# Patient Record
Sex: MALE | Race: WHITE | Employment: OTHER | URBAN - METROPOLITAN AREA
[De-identification: names, ages, dates, MRNs, and addresses within clinical notes are randomized per-mention and may not be internally consistent; named-entity substitution may affect disease eponyms.]

---

## 2017-03-02 RX ORDER — ISOSORBIDE MONONITRATE 60 MG/1
TABLET, EXTENDED RELEASE ORAL
Qty: 90 TAB | Refills: 3 | Status: SHIPPED | OUTPATIENT
Start: 2017-03-02 | End: 2017-03-07 | Stop reason: SDUPTHER

## 2017-03-08 RX ORDER — ISOSORBIDE MONONITRATE 60 MG/1
TABLET, EXTENDED RELEASE ORAL
Qty: 90 TAB | Refills: 3 | Status: SHIPPED | OUTPATIENT
Start: 2017-03-08 | End: 2018-01-06 | Stop reason: SDUPTHER

## 2017-05-16 ENCOUNTER — OFFICE VISIT (OUTPATIENT)
Dept: CARDIOLOGY CLINIC | Age: 72
End: 2017-05-16

## 2017-05-16 VITALS
BODY MASS INDEX: 34.8 KG/M2 | OXYGEN SATURATION: 98 % | HEIGHT: 69 IN | HEART RATE: 76 BPM | WEIGHT: 235 LBS | DIASTOLIC BLOOD PRESSURE: 78 MMHG | SYSTOLIC BLOOD PRESSURE: 110 MMHG

## 2017-05-16 DIAGNOSIS — E78.00 PURE HYPERCHOLESTEROLEMIA: ICD-10-CM

## 2017-05-16 DIAGNOSIS — I48.19 PERSISTENT ATRIAL FIBRILLATION (HCC): Primary | ICD-10-CM

## 2017-05-16 DIAGNOSIS — I25.10 CORONARY ARTERY DISEASE INVOLVING NATIVE CORONARY ARTERY OF NATIVE HEART WITHOUT ANGINA PECTORIS: ICD-10-CM

## 2017-05-16 DIAGNOSIS — I10 ESSENTIAL HYPERTENSION: ICD-10-CM

## 2017-05-16 NOTE — PROGRESS NOTES
Review of Systems   Constitutional: Positive for weight loss. Negative for chills, diaphoresis, fever and malaise/fatigue. Respiratory: Negative. Cardiovascular: Negative. Gastrointestinal: Positive for heartburn. Negative for abdominal pain, blood in stool, constipation, diarrhea, melena, nausea and vomiting. Musculoskeletal: Negative. Neurological: Negative for weakness.

## 2017-05-16 NOTE — PROGRESS NOTES
HPI: I saw Dario White in my office today in cardiovascular evaluation regarding his chronic coronary artery disease and atrial fibrillation. Mr. Ulysses Dillon is a pleasant 81 y.o. white male who I had not seen for 15-20 years when he presented to the office on 3/21/16 with the above-captioned problems. I had originally seen him for Dr. Michelle Capone for new onset atrial fibrillation many years ago. I tried to do a cardioversion at one time and that was unsuccessful and we decided to control his heart rate with beta-blockers and anticoagulate him with Coumadin.       He has been followed more recently by Dr. Luz Maria Lane over the years, but began to have more shortness of breath and some chest tightness sounding anginal prompting him to come to see him on 3/21/16. We did a nuclear myocardial perfusion study at that time which was read as low risk showing no signs of ischemia and low normal ejection fraction 52%. I increased his Toprol-XL and added Imdur to his regimen and he really did reasonably well until he came into my office on October 7, 2016 with a history of increasing shortness of breath and chest tightness with activity which is quite restricting and even though his nuclear myocardial perfusion study 6 months ago was negative his symptoms were so concerning that I decided to refer him directly for cardiac catheterization.     The cardiac catheterization was completed by Dr. Jae Wall on October 17, 2016 with findings of only moderate two-vessel coronary artery disease with a 70% mid LAD obstruction and a 60% mid circumflex obstructions neither of which were shown to have any hemodynamic significance with fractional flow reserve testing. He did have a mean gradient of 29 mmHg across the aortic valve which was difficult to obtain adequate values for in view of the atrial fibrillation suggesting moderate aortic stenosis.  He subsequently had an echocardiogram the same day which showed a mean gradient of 21 mmHg across the valve again suggesting mild to moderate aortic stenosis. He comes to the office today and relates he is really doing very well. He has been put on an inhaler by Dr. Dennys Robbins and seems to be breathing somewhat better and is little bit more active. He denies any chest pain issues and tells me he is going back to golfing and denies any cardiac symptomatology at this time. Encounter Diagnoses   Name Primary?  Persistent atrial fibrillation (HCC) Yes    Coronary artery disease involving native coronary artery of native heart without angina pectoris     Essential hypertension     Pure hypercholesterolemia        Discussion: This patient appears to be doing about as well as we could expect and I really have no recommendations for change at this time. He seems to be well compensated from a cardiovascular vantage without any symptoms to suggest the development of symptomatic obstructive coronary artery disease. The auscultatory examination of his heart does not suggest that his aortic stenosis is getting any worse and I still feel that this is probably moderate in severity and I do not feel we need to do any testing at this time to further assess his aortic stenosis. He tells me his cholesterols been doing quite well as managed through Dr. Jenna Kendall office and I will get a copy of that for my records but historically his cholesterol has done reasonably well with LDLs in the 80s on Zocor 40 mg daily and I will leave management of his cholesterol to Dr. Dennys Robbins. His blood pressure is very well-controlled today and his heart rate in atrial fibrillation appears to be well-controlled on EKG today so I am simply get a plan to see him again in several months. PCP: Jose Charlton MD      Past Medical History:   Diagnosis Date    Atrial fibrillation ([facility] [address] 1998    CAD (coronary artery disease)     Cardiac nuclear imaging test, normal 04/01/2016    Low risk. No ischemia or prior infarction. EF 52%.   Neg EKG on pharm stress test.    Diabetes ([facility] [address]     DM (diabetes mellitus) ([facility] [address]     GERD (gastroesophageal reflux disease)     Hematuria 2001    HLD (hyperlipidemia)     HTN (hypertension)     HTN (hypertension)     Hypothyroid     Pancreatitis        Past Surgical History:   Procedure Laterality Date    CARDIAC CATHETERIZATION  10/17/2016         CORONARY ARTERY ANGIOGRAM  10/17/2016         FRACTIONAL FLOW RESERVE  10/17/2016         FRACTIONAL FLOW RESERVE ADDL  10/17/2016         HX CYST REMOVAL      x4    LV ANGIOGRAPHY  10/17/2016            Current Outpatient Rx   Name  Route  Sig  Dispense  Refill    isosorbide mononitrate ER (IMDUR) 60 mg CR tablet    Oral    Take 1 Tab by mouth every morning. 90 Tab    2      aspirin delayed-release 81 mg tablet    Oral    Take 81 mg by mouth daily.  aclidinium bromide (TUDORZA PRESSAIR) 400 mcg/actuation inhaler    Inhalation    Take 1 Puff by inhalation daily.  simvastatin (ZOCOR) 40 mg tablet    Oral    Take 40 mg by mouth nightly.  warfarin (COUMADIN) 5 mg tablet    Oral    Take 5 mg by mouth. As directed. Followed by PCP              metFORMIN ER (GLUCOPHAGE XR) 500 mg tablet    Oral    Take 500 mg by mouth daily (with dinner).  levothyroxine (SYNTHROID) 100 mcg tablet    Oral    Take 100 mcg by mouth Daily (before breakfast).  HYDROcodone-acetaminophen (XODOL) 5-300 mg tablet    Oral    Take 1 Tab by mouth every six (6) hours as needed.  nitroglycerin (NITROSTAT) 0.4 mg SL tablet    SubLINGual    0.4 mg by SubLINGual route every five (5) minutes as needed for Chest Pain.  umeclidinium-vilanterol 62.5-25 mcg/actuation dsdv    Inhalation    Take 1 Puff by inhalation daily.  cholecalciferol, vitamin D3, 2,000 unit tab    Oral    Take 1 Tab by mouth daily.               metoprolol succinate (TOPROL-XL) 100 mg XL tablet    Oral    Take 1.5 Tabs by mouth daily. 60 Tab    3         No Known Allergies    Social History :  Social History   Substance Use Topics    Smoking status: Current Every Day Smoker     Packs/day: 1.00     Years: 52.00    Smokeless tobacco: Never Used    Alcohol use Yes        Family History: family history includes Diabetes in his father and mother; Stroke in his father. Review of Systems:   Constitutional: Positive for weight loss. Negative for chills, diaphoresis, fever and malaise/fatigue. Respiratory: Negative. Cardiovascular: Negative. Gastrointestinal: Positive for heartburn. Negative for abdominal pain, blood in stool, constipation, diarrhea, melena, nausea and vomiting. Musculoskeletal: Negative. Neurological: Negative for weakness. Physical Exam:    The patient is a cooperative, alert, well developed, well nourished 81 y.o. white  male with full white beard who is in no acute distress at the time of the examination. Visit Vitals    Ht 5' 9\" (1.753 m)       HEENT: Conjuctiva white, mucosa moist, no pallor or cyanosis. Xanthelasmas on the medial aspect of both eyelids. NECK: Supple without masses, tenderness or thyromegaly. There was no jugular venous distention. Carotid are full bilaterally without bruits. CHEST: Symmetrical with good excursion. LUNGS: Clear to auscultation in all fields. HEART: The apex is not displaced. There were no lifts, thrills or heaves. There is a normal S1 and S2. There is a grade 1-2/6 mid peaking systolic ejection murmur along left sternal border with radiation to the base without appreciable diastolic murmurs, rubs, clicks, or gallops auscultated. ABDOMEN: Soft without masses, tenderness or organomegaly. EXTREMITIES: Full peripheral pulses without peripheral edema. INTEGUMENT: Warm and dry   NEUROLOGICAL: The patient is oriented x 3 with motor function grossly intact.     Review of Data: See PMH and Cardiology and Imaging sections for cardiac testing      Results for orders placed or performed in visit on 10/24/16   AMB POC EKG ROUTINE W/ 12 LEADS, INTER & REP     Status: None    Narrative    Atrial fibrillation with controlled ventricular response rate in the mid 80's. There is a Q-wave in lead V2 which could suggest past anteroseptal infarction or simply be from lead placement variation. Left ventricular hypertrophy by limb lead voltage criteria. This EKG is similar to the EKG of October 12, 2016. Reed Fisher D.O., F.A.C.C. [facility]  [facility]  [place] [address]. [address] [facility]  [phone]    PLEASE NOTE:  This document has been produced using voice recognition software. Unrecognized errors in transcription may be present.

## 2017-05-16 NOTE — MR AVS SNAPSHOT
Visit Information Date & Time Provider Department Dept. Phone Encounter #  
 5/16/2017 10:20 AM Larissa Sánchez DO [facility] [id number] Follow-up Instructions Return in about 6 months (around 11/16/2017), or if symptoms worsen or fail to improve. Upcoming Health Maintenance Date Due Hepatitis C Screening 1945 FOOT EXAM Q1 4/3/1957 EYE EXAM RETINAL OR DILATED Q1 4/3/1957 DTaP/Tdap/Td series (1 - Tdap) 4/3/1968 ZOSTER VACCINE AGE 60> 4/3/2007 MICROALBUMIN Q1 11/4/2010 GLAUCOMA SCREENING Q2Y 4/3/2012 Pneumococcal 65+ Low/Medium Risk (1 of 2 - PCV13) 4/3/2012 MEDICARE YEARLY EXAM 4/3/2012 HEMOGLOBIN A1C Q6M 7/22/2016 FOBT Q 1 YEAR AGE 50-75 7/24/2016 LIPID PANEL Q1 4/25/2017 INFLUENZA AGE 9 TO ADULT 8/1/2017 Allergies as of 5/16/2017  Review Complete On: 5/16/2017 By: Larissa Sánchez DO No Known Allergies Current Immunizations  Never Reviewed No immunizations on file. Not reviewed this visit You Were Diagnosed With   
  
 Codes Comments Persistent atrial fibrillation (HCC)    -  Primary ICD-10-CM: I48.1 ICD-9-CM: 427.31 Coronary artery disease involving native coronary artery of native heart without angina pectoris     ICD-10-CM: I25.10 ICD-9-CM: 414.01 Essential hypertension     ICD-10-CM: I10 
ICD-9-CM: 401.9 Pure hypercholesterolemia     ICD-10-CM: E78.00 ICD-9-CM: 272.0 Vitals BP Pulse Height(growth percentile) Weight(growth percentile) SpO2 BMI  
 110/78 76 5' 9\" (1.753 m) 235 lb (106.6 kg) 98% 34.7 kg/m2 Smoking Status Current Every Day Smoker Vitals History BMI and BSA Data Body Mass Index Body Surface Area 34.7 kg/m 2 2.28 m 2 Preferred Pharmacy Pharmacy Name Phone 305 [facility], [address] [address] [place] [address] [address] Your Updated Medication List  
  
   
 This list is accurate as of: 5/16/17 11:19 AM.  Always use your most recent med list.  
  
  
  
  
 aspirin delayed-release 81 mg tablet Take 81 mg by mouth daily. cholecalciferol (vitamin D3) 2,000 unit Tab Take 1 Tab by mouth daily. HYDROcodone-acetaminophen 5-300 mg tablet Commonly known as:  Derald Dracut Take 1 Tab by mouth every six (6) hours as needed. isosorbide mononitrate ER 60 mg CR tablet Commonly known as:  IMDUR  
take 1 tablet by mouth every morning  
  
 levothyroxine 100 mcg tablet Commonly known as:  SYNTHROID Take 100 mcg by mouth Daily (before breakfast). metFORMIN  mg tablet Commonly known as:  GLUCOPHAGE XR Take 500 mg by mouth daily (with dinner). metoprolol succinate 100 mg tablet Commonly known as:  TOPROL-XL Take 1.5 Tabs by mouth daily. nitroglycerin 0.4 mg SL tablet Commonly known as:  NITROSTAT  
1 Tab by SubLINGual route every five (5) minutes as needed for Chest Pain.  
  
 simvastatin 40 mg tablet Commonly known as:  ZOCOR Take 40 mg by mouth nightly. umeclidinium-vilanterol 62.5-25 mcg/actuation inhaler Commonly known as:  Madhavi Ronal Take 1 Puff by inhalation daily. warfarin 5 mg tablet Commonly known as:  COUMADIN Take 5 mg by mouth. As directed. Followed by PCP We Performed the Following AMB POC EKG ROUTINE W/ 12 LEADS, INTER & REP [92488 CPT(R)] Follow-up Instructions Return in about 6 months (around 11/16/2017), or if symptoms worsen or fail to improve. Introducing [facility]! [employer] introduces Loop patient portal. Now you can access parts of your medical record, email your doctor's office, and request medication refills online. 1. In your internet browser, go to [email or web address] 2. Click on the First Time User? Click Here link in the Sign In box. You will see the New Member Sign Up page. 3. Enter your [facility] Access Code exactly as it appears below. You will not need to use this code after youve completed the sign-up process. If you do not sign up before the expiration date, you must request a new code. · [facility] Access Code: [facility] Expires: 8/14/2017 10:30 AM 
 
4. Enter the last four digits of your Social Security Number (xxxx) and Date of Birth (mm/dd/yyyy) as indicated and click Submit. You will be taken to the next sign-up page. 5. Create a [employer] ID. This will be your [employer]story login ID and cannot be changed, so think of one that is secure and easy to remember. 6. Create a [facility]story password. You can change your password at any time. 7. Enter your Password Reset Question and Answer. This can be used at a later time if you forget your password. 8. Enter your e-mail address. You will receive e-mail notification when new information is available in [address]. 9. Click Sign Up. You can now view and download portions of your medical record. 10. Click the Download Summary menu link to download a portable copy of your medical information. If you have questions, please visit the Frequently Asked Questions section of the [employer] website. Remember, [employer] is NOT to be used for urgent needs. For medical emergencies, dial 911. Now available from your iPhone and Android! Please provide this summary of care documentation to your next provider. Your primary care clinician is listed as Vicenta Soler. If you have any questions after today's visit, please call [phone].

## 2017-08-16 ENCOUNTER — HOSPITAL ENCOUNTER (OUTPATIENT)
Dept: CT IMAGING | Age: 72
Discharge: HOME | End: 2017-08-16
Attending: INTERNAL MEDICINE
Payer: MEDICARE

## 2017-08-16 DIAGNOSIS — R19.07 ABDOMINAL OR PELVIC SWELLING, MASS, OR LUMP, GENERALIZED: ICD-10-CM

## 2017-08-16 DIAGNOSIS — R22.2 MASS IN CHEST: ICD-10-CM

## 2017-08-16 LAB — CREAT UR-MCNC: 1 MG/DL (ref 0.6–1.3)

## 2017-08-16 PROCEDURE — 74177 CT ABD & PELVIS W/CONTRAST: CPT

## 2017-08-16 PROCEDURE — 82565 ASSAY OF CREATININE: CPT

## 2017-08-16 PROCEDURE — 74011636320 HC RX REV CODE- 636/320: Performed by: INTERNAL MEDICINE

## 2017-08-16 RX ADMIN — IOPAMIDOL 80 ML: 612 INJECTION, SOLUTION INTRAVENOUS at 16:00

## 2017-10-11 RX ORDER — METOPROLOL SUCCINATE 100 MG/1
150 TABLET, EXTENDED RELEASE ORAL DAILY
Qty: 135 TAB | Refills: 3 | Status: SHIPPED | OUTPATIENT
Start: 2017-10-11 | End: 2018-09-06 | Stop reason: SDUPTHER

## 2017-11-13 ENCOUNTER — OFFICE VISIT (OUTPATIENT)
Dept: SURGERY | Age: 72
End: 2017-11-13

## 2017-11-13 VITALS — RESPIRATION RATE: 18 BRPM | DIASTOLIC BLOOD PRESSURE: 80 MMHG | SYSTOLIC BLOOD PRESSURE: 110 MMHG

## 2017-11-13 DIAGNOSIS — R59.0 LYMPHADENOPATHY OF HEAD AND NECK REGION: Primary | ICD-10-CM

## 2017-11-13 RX ORDER — PREDNISONE 10 MG/1
TABLET ORAL
COMMUNITY
End: 2017-11-28 | Stop reason: ALTCHOICE

## 2017-11-13 RX ORDER — AMOXICILLIN AND CLAVULANATE POTASSIUM 875; 125 MG/1; MG/1
TABLET, FILM COATED ORAL EVERY 12 HOURS
COMMUNITY
End: 2017-11-28 | Stop reason: ALTCHOICE

## 2017-11-13 RX ORDER — PANTOPRAZOLE SODIUM 40 MG/1
40 TABLET, DELAYED RELEASE ORAL 2 TIMES DAILY
COMMUNITY

## 2017-11-13 NOTE — PROGRESS NOTES
Progress Note    Patient: Jama Sin  MRN: [id number]  SSN: [id number]   YOB: 1945  Age: 81 y.o. Sex: male     Chief Complaint   Patient presents with    Mass     left side of neck       HPI    Mr. Mason is a 72-year-old gentleman with a mass near his parotid gland on the left side of his neck. He is here for that he does not know how long he has had it but it has been probably a year or more. He is due for a CT scan of his neck tomorrow. He has no oral lesions or dental caries that he reports never had fever or weight loss. These are painless and enlarging. He does have a history of smoking and alcohol consumption. Past Medical History:   Diagnosis Date    Atrial fibrillation ([address] 1998    CAD (coronary artery disease)     Cardiac nuclear imaging test, normal 04/01/2016    Low risk. No ischemia or prior infarction. EF 52%. Neg EKG on pharm stress test.    Diabetes ([address]     DM (diabetes mellitus) ([address]     GERD (gastroesophageal reflux disease)     Hematuria 2001    HLD (hyperlipidemia)     HTN (hypertension)     HTN (hypertension)     Hypothyroid     Pancreatitis      Past Surgical History:   Procedure Laterality Date    CARDIAC CATHETERIZATION  10/17/2016         CORONARY ARTERY ANGIOGRAM  10/17/2016         FRACTIONAL FLOW RESERVE  10/17/2016         FRACTIONAL FLOW RESERVE ADDL  10/17/2016         HX CYST REMOVAL      x4    LV ANGIOGRAPHY  10/17/2016          No Known Allergies  Current Outpatient Prescriptions   Medication Sig Dispense Refill    pantoprazole (PROTONIX) 40 mg tablet Take 40 mg by mouth daily.  amoxicillin-clavulanate (AUGMENTIN) 875-125 mg per tablet Take  by mouth every twelve (12) hours.  predniSONE (DELTASONE) 10 mg tablet Take  by mouth daily (with breakfast).  metoprolol succinate (TOPROL-XL) 100 mg tablet Take 1.5 Tabs by mouth daily.  135 Tab 3    isosorbide mononitrate ER (IMDUR) 60 mg CR tablet take 1 tablet by mouth every morning 90 Tab 3    nitroglycerin (NITROSTAT) 0.4 mg SL tablet 1 Tab by SubLINGual route every five (5) minutes as needed for Chest Pain. 25 Bottle 6    aspirin delayed-release 81 mg tablet Take 81 mg by mouth daily.  simvastatin (ZOCOR) 40 mg tablet Take 40 mg by mouth nightly.  warfarin (COUMADIN) 5 mg tablet Take 5 mg by mouth. As directed. Followed by PCP      metFORMIN ER (GLUCOPHAGE XR) 500 mg tablet Take 500 mg by mouth daily (with dinner).  levothyroxine (SYNTHROID) 100 mcg tablet Take 100 mcg by mouth Daily (before breakfast).  HYDROcodone-acetaminophen (XODOL) 5-300 mg tablet Take 1 Tab by mouth every six (6) hours as needed.  umeclidinium-vilanterol 62.5-25 mcg/actuation dsdv Take 1 Puff by inhalation daily.  cholecalciferol, vitamin D3, 2,000 unit tab Take 1 Tab by mouth daily. Social History     Social History    Marital status:      Spouse name: N/A    Number of children: N/A    Years of education: N/A     Occupational History    Not on file.      Social History Main Topics    Smoking status: Current Every Day Smoker     Packs/day: 1.00     Years: 52.00    Smokeless tobacco: Never Used    Alcohol use Yes    Drug use: Not on file    Sexual activity: Not on file     Other Topics Concern    Not on file     Social History Narrative    ** Merged History Encounter **          Family History   Problem Relation Age of Onset    Diabetes Mother     Diabetes Father     Stroke Father          Review of systems:  Patient denies any reflux, emesis, abdominal pain, change in bowel habits, hematochezia, melena, fever, weight loss, fatigue chills, dermatitis, abnormal moles, change in vision, vertigo, epistaxis, dysphagia, hoarseness, chest pain, palpitations, hypertension, edema, cough, shortness of breath, wheezing, hemoptysis, snoring, hematuria, diabetes, thyroid disease, anemia, bruising, history of blood transfusion, dizziness, headache, or fainting. Physical Examination    Well developed well nourished male in no apparent distress  Visit Vitals    /80    Resp 18      Head: normocephalic, atraumatic  Mouth: Clear, no overt lesions, oral mucosa pink and moist no lesions, infections or caries  Neck: supple, no masses, no adenopathy or carotid bruits, trachea midline   Resp: clear to auscultation bilaterally, no wheeze, rhonchi or rales, excursi easily easily palpable lymph node left neck just below the bottom of the ear   ons normal and symmetrical  Cardio: Regular rate and rhythm, no murmurs, clicks, gallops or rubs, no edema or varicosities  Abdomen: soft, nontender, nondistended, normoactive bowel sounds, no hernias, no hepatosplenomegaly,   Back: Deferred  Extremeties: warm, well-perfused, no tenderness or swelling, normal gait/station  Neuro: sensation and strength grossly intact and symmetrical  Psych: alert and oriented to person, place and time  Breast exam deferred    IMPRESSION  Left neck lymphadenopathy scheduled for CT scan tomorrow    PLAN  Orders Placed This Encounter    pantoprazole (PROTONIX) 40 mg tablet     Sig: Take 40 mg by mouth daily.  amoxicillin-clavulanate (AUGMENTIN) 875-125 mg per tablet     Sig: Take  by mouth every twelve (12) hours.  predniSONE (DELTASONE) 10 mg tablet     Sig: Take  by mouth daily (with breakfast). Hold on biopsy as this may be a parotid mass. Geovanna Nash MD    62-07-60 Mr. Marissa Worthy had his CT scan of his neck yesterday. This shows bilateral lymphadenopathy unchanged since 2015 both of the large lymph nodes are near the parotid glands.   I will refer him to ENT for further evaluation and workup

## 2017-11-14 ENCOUNTER — HOSPITAL ENCOUNTER (OUTPATIENT)
Dept: CT IMAGING | Age: 72
Discharge: HOME | End: 2017-11-14
Attending: INTERNAL MEDICINE
Payer: MEDICARE

## 2017-11-14 DIAGNOSIS — I88.9 NONSPECIFIC LYMPHADENITIS, UNSPECIFIED: ICD-10-CM

## 2017-11-14 PROCEDURE — 70490 CT SOFT TISSUE NECK W/O DYE: CPT

## 2017-11-15 PROBLEM — R59.0 LYMPHADENOPATHY OF HEAD AND NECK REGION: Status: ACTIVE | Noted: 2017-11-15

## 2017-11-28 ENCOUNTER — OFFICE VISIT (OUTPATIENT)
Dept: CARDIOLOGY CLINIC | Age: 72
End: 2017-11-28

## 2017-11-28 VITALS
SYSTOLIC BLOOD PRESSURE: 98 MMHG | HEART RATE: 83 BPM | DIASTOLIC BLOOD PRESSURE: 66 MMHG | HEIGHT: 69 IN | WEIGHT: 240 LBS | BODY MASS INDEX: 35.55 KG/M2 | OXYGEN SATURATION: 97 %

## 2017-11-28 DIAGNOSIS — I35.0 AORTIC VALVE STENOSIS, ETIOLOGY OF CARDIAC VALVE DISEASE UNSPECIFIED: ICD-10-CM

## 2017-11-28 DIAGNOSIS — I10 ESSENTIAL HYPERTENSION: ICD-10-CM

## 2017-11-28 DIAGNOSIS — I48.19 PERSISTENT ATRIAL FIBRILLATION (HCC): ICD-10-CM

## 2017-11-28 DIAGNOSIS — E78.00 PURE HYPERCHOLESTEROLEMIA: ICD-10-CM

## 2017-11-28 DIAGNOSIS — I25.10 CORONARY ARTERY DISEASE INVOLVING NATIVE CORONARY ARTERY OF NATIVE HEART WITHOUT ANGINA PECTORIS: Primary | ICD-10-CM

## 2017-11-28 NOTE — PROGRESS NOTES
HPI: I saw Rebecca Olea in my office today in cardiovascular evaluation regarding his chronic coronary artery disease, atrial fibrillation, and aortic valvular disease. Mr. Jaqueline Oliva is a pleasant 81 y.o. white male who I had not seen for 15-20 years when he presented to the office on 3/21/16 with the above-captioned problems. I had originally seen him for Dr. Eliecer Murillo for new onset atrial fibrillation many years ago. I tried to do a cardioversion at one time and that was unsuccessful and we decided to control his heart rate with beta-blockers and anticoagulate him with Coumadin.       He has been followed more recently by Dr. Tracie Donohue over the years, but began to have more shortness of breath and some chest tightness sounding anginal prompting him to come to see him on 3/21/16. We did a nuclear myocardial perfusion study at that time which was read as low risk showing no signs of ischemia and low normal ejection fraction 52%. I increased his Toprol-XL and added Imdur to his regimen and he really did reasonably well until he came into my office on October 7, 2016 with a history of increasing shortness of breath and chest tightness with activity which is quite restricting and even though his nuclear myocardial perfusion study 6 months ago was negative his symptoms were so concerning that I decided to refer him directly for cardiac catheterization.      The cardiac catheterization was completed by Dr. Jack Polo on October 17, 2016 with findings of only moderate two-vessel coronary artery disease with a 70% mid LAD obstruction and a 60% mid circumflex obstructions neither of which were shown to have any hemodynamic significance with fractional flow reserve testing. He did have a mean gradient of 29 mmHg across the aortic valve which was difficult to obtain adequate values for in view of the atrial fibrillation suggesting moderate aortic stenosis.  He subsequently had an echocardiogram the same day which showed a mean gradient of 21 mmHg across the valve again suggesting mild to moderate aortic stenosis. Encounter Diagnoses   Name Primary?  Coronary artery disease involving native coronary artery of native heart without angina pectoris Yes    Persistent atrial fibrillation (HCC)     Essential hypertension     Pure hypercholesterolemia     Aortic valve stenosis, etiology of cardiac valve disease unspecified        Discussion: This patient appears to be doing reasonably well. He is not having any problems with exertional chest tightness currently although he always has some shortness of breath he thinks related to his persistent half a pack a day cigarette abuse. His aortic stenosis murmur does not sound more severe but I am going to repeat an echocardiogram on him to see how quickly his aortic stenosis is worsening and just to be sure there is no change in his overall left ventricular function. His cholesterol has been somewhat elevated in the past and this is been managed by Dr. Anthony Rebolledo and the patient is currently taking Zocor 40 mg daily for treatment of this problem. I am going to get a copy of his cholesterol from Dr. Anthony Rebolledo for my records. Certainly would like to see his LDL at least under 70 if possible. His blood pressure is well-controlled today and his heart rate atrial fibrillation is well-controlled in the 80s as well. He does continue on Coumadin for anticoagulation but this is being managed by Dr. Anthony Rebolledo. Since the patient is otherwise doing fairly well I am simply get a plan to see him again in several months    PCP: Marry Mcneal MD      Past Medical History:   Diagnosis Date    [facility]rial fibrillation [facility] [address] 1998    CAD (coronary artery disease)     Cardiac nuclear imaging test, normal 04/01/2016    Low risk. No ischemia or prior infarction. EF 52%.   Neg EKG on pharm stress test.    Diabetes ([address]     DM (diabetes mellitus) ([facility] [address]     GERD (gastroesophageal reflux disease)     Hematuria 2001  HLD (hyperlipidemia)     HTN (hypertension)     HTN (hypertension)     Hypothyroid     Pancreatitis        Past Surgical History:   Procedure Laterality Date    CARDIAC CATHETERIZATION  10/17/2016         CORONARY ARTERY ANGIOGRAM  10/17/2016         FRACTIONAL FLOW RESERVE  10/17/2016         FRACTIONAL FLOW RESERVE ADDL  10/17/2016         HX CYST REMOVAL      x4    LV ANGIOGRAPHY  10/17/2016            Current Outpatient Rx   Name  Route  Sig  Dispense  Refill    isosorbide mononitrate ER (IMDUR) 60 mg CR tablet    Oral    Take 1 Tab by mouth every morning. 90 Tab    2      aspirin delayed-release 81 mg tablet    Oral    Take 81 mg by mouth daily.  aclidinium bromide (TUDORZA PRESSAIR) 400 mcg/actuation inhaler    Inhalation    Take 1 Puff by inhalation daily.  simvastatin (ZOCOR) 40 mg tablet    Oral    Take 40 mg by mouth nightly.  warfarin (COUMADIN) 5 mg tablet    Oral    Take 5 mg by mouth. As directed. Followed by PCP              metFORMIN ER (GLUCOPHAGE XR) 500 mg tablet    Oral    Take 500 mg by mouth daily (with dinner).  levothyroxine (SYNTHROID) 100 mcg tablet    Oral    Take 100 mcg by mouth Daily (before breakfast).  HYDROcodone-acetaminophen (XODOL) 5-300 mg tablet    Oral    Take 1 Tab by mouth every six (6) hours as needed.  nitroglycerin (NITROSTAT) 0.4 mg SL tablet    SubLINGual    0.4 mg by SubLINGual route every five (5) minutes as needed for Chest Pain.  umeclidinium-vilanterol 62.5-25 mcg/actuation dsdv    Inhalation    Take 1 Puff by inhalation daily.  cholecalciferol, vitamin D3, 2,000 unit tab    Oral    Take 1 Tab by mouth daily.  metoprolol succinate (TOPROL-XL) 100 mg XL tablet    Oral    Take 1.5 Tabs by mouth daily.     60 Tab    3         No Known Allergies    Social History :  Social History   Substance Use Topics    Smoking status: Current Every Day Smoker     Packs/day: 1.00     Years: 52.00    Smokeless tobacco: Never Used    Alcohol use Yes        Family History: family history includes Diabetes in his father and mother; Stroke in his father. Review of Systems:   Constitutional: Negative for chills, fever, malaise/fatigue and weight loss. Respiratory: Positive for cough and shortness of breath. Negative for hemoptysis and wheezing. Cardiovascular: Positive for palpitations and leg swelling. Negative for chest pain and orthopnea. Gastrointestinal: Positive for heartburn. Negative for abdominal pain, blood in stool, constipation, diarrhea, melena, nausea and vomiting. Musculoskeletal: Negative for falls, joint pain and myalgias. Neurological: Negative for dizziness. Physical Exam:    The patient is a cooperative, alert, well developed, well nourished 81 y.o. white  male with full white beard who is in no acute distress at the time of the examination. Visit Vitals    BP 98/66    Pulse 83    Ht 5' 9\" (1.753 m)    Wt 108.9 kg (240 lb)    SpO2 97%    BMI 35.44 kg/m2       HEENT: Conjuctiva white, mucosa moist, no pallor or cyanosis. Xanthelasmas on the medial aspect of both eyelids. NECK: Supple without masses, tenderness or thyromegaly. There was no jugular venous distention. Carotid are full bilaterally without bruits. CHEST: Symmetrical with good excursion. LUNGS: Clear to auscultation in all fields. HEART: The apex is not displaced. There were no lifts, thrills or heaves. There is a normal S1 and S2. There is a grade 1-2/6 mid peaking systolic ejection murmur along left sternal border with radiation to the base without appreciable diastolic murmurs, rubs, clicks, or gallops auscultated. ABDOMEN: Soft without masses, tenderness or organomegaly. EXTREMITIES: Full peripheral pulses without peripheral edema. INTEGUMENT: Warm and dry   NEUROLOGICAL: The patient is oriented x 3 with motor function grossly intact.     Review of Data: See PMH and Cardiology and Imaging sections for cardiac testing      Results for orders placed or performed in visit on 11/28/17   AMB POC EKG ROUTINE W/ 12 LEADS, INTER & REP     Status: None    Narrative    Atrial fibrillation with a controlled ventricular response rate around 80. Left axis deviation with possible left anterior fascicular block. Leftward hypertrophy by limb lead voltage criteria. Diffuse nonspecific inferolateral ST-T changes. Compared to the EKG of May 16, 2017 PVC noted at that time is not noted on the current tracing. Benjie Sharif D.O., F.A.C.C. [facility]  [facility]  [address] [place] [address]. [address] [address] [facility] [address]  [phone]    PLEASE NOTE:  This document has been produced using voice recognition software. Unrecognized errors in transcription may be present.

## 2017-11-28 NOTE — PROGRESS NOTES
1. Have you been to the ER, urgent care clinic since your last visit? Hospitalized since your last visit?no      2. Have you seen or consulted any other health care providers outside of the [address] [place] [address] since your last visit? Include any pap smears or colon screening.  no

## 2017-11-28 NOTE — MR AVS SNAPSHOT
Visit Information Date & Time Provider Department Dept. Phone Encounter #  
 11/28/2017  1:20 PM Brown Garcia DO [facility] [address] [id number] Follow-up Instructions Return in about 6 months (around 5/28/2018), or if symptoms worsen or fail to improve. Follow-up and Disposition History Upcoming Health Maintenance Date Due Hepatitis C Screening 1945 FOOT EXAM Q1 4/3/1957 EYE EXAM RETINAL OR DILATED Q1 4/3/1957 DTaP/Tdap/Td series (1 - Tdap) 4/3/1968 ZOSTER VACCINE AGE 60> 2/3/2007 MICROALBUMIN Q1 11/4/2010 GLAUCOMA SCREENING Q2Y 4/3/2012 Pneumococcal 65+ Low/Medium Risk (1 of 2 - PCV13) 4/3/2012 MEDICARE YEARLY EXAM 4/3/2012 HEMOGLOBIN A1C Q6M 7/22/2016 FOBT Q 1 YEAR AGE 50-75 7/24/2016 LIPID PANEL Q1 4/25/2017 Influenza Age 5 to Adult 8/1/2017 Allergies as of 11/28/2017  Review Complete On: 11/28/2017 By: Brown Garcia DO No Known Allergies Current Immunizations  Never Reviewed No immunizations on file. Not reviewed this visit You Were Diagnosed With   
  
 Codes Comments Coronary artery disease involving native coronary artery of native heart without angina pectoris    -  Primary ICD-10-CM: I25.10 ICD-9-CM: 414.01 Persistent atrial fibrillation (HCC)     ICD-10-CM: I48.1 ICD-9-CM: 427.31 Essential hypertension     ICD-10-CM: I10 
ICD-9-CM: 401.9 Pure hypercholesterolemia     ICD-10-CM: E78.00 ICD-9-CM: 272.0 Aortic valve stenosis, etiology of cardiac valve disease unspecified     ICD-10-CM: I35.0 ICD-9-CM: 424.1 Vitals BP Pulse Height(growth percentile) Weight(growth percentile) SpO2 BMI  
 98/66 83 5' 9\" (1.753 m) 240 lb (108.9 kg) 97% 35.44 kg/m2 Smoking Status Current Every Day Smoker Vitals History BMI and BSA Data Body Mass Index Body Surface Area  
 35.44 kg/m 2 2.3 m 2 Preferred Pharmacy Pharmacy Name Phone [address] [facility], [address] [facility] [address] Audrey Mcclelland Your Updated Medication List  
  
   
This list is accurate as of: 11/28/17  2:31 PM.  Always use your most recent med list.  
  
  
  
  
 aspirin delayed-release 81 mg tablet Take 81 mg by mouth daily. cholecalciferol (vitamin D3) 2,000 unit Tab Take 1 Tab by mouth daily. HYDROcodone-acetaminophen 5-300 mg tablet Commonly known as:  Gardner Rossy Take 1 Tab by mouth every six (6) hours as needed. isosorbide mononitrate ER 60 mg CR tablet Commonly known as:  IMDUR  
take 1 tablet by mouth every morning  
  
 levothyroxine 100 mcg tablet Commonly known as:  SYNTHROID Take 100 mcg by mouth Daily (before breakfast). metFORMIN  mg tablet Commonly known as:  GLUCOPHAGE XR Take 500 mg by mouth daily (with dinner). metoprolol succinate 100 mg tablet Commonly known as:  TOPROL-XL Take 1.5 Tabs by mouth daily. nitroglycerin 0.4 mg SL tablet Commonly known as:  NITROSTAT  
1 Tab by SubLINGual route every five (5) minutes as needed for Chest Pain.  
  
 pantoprazole 40 mg tablet Commonly known as:  PROTONIX Take 40 mg by mouth daily. simvastatin 40 mg tablet Commonly known as:  ZOCOR Take 40 mg by mouth nightly. umeclidinium-vilanterol 62.5-25 mcg/actuation inhaler Commonly known as:  Dianah Splinter Take 1 Puff by inhalation daily. warfarin 5 mg tablet Commonly known as:  COUMADIN Take 5 mg by mouth. As directed. Followed by PCP We Performed the Following AMB POC EKG ROUTINE W/ 12 LEADS, INTER & REP [27199 CPT(R)] Follow-up Instructions Return in about 6 months (around 5/28/2018), or if symptoms worsen or fail to improve. To-Do List   
 11/28/2017   ECHO:  2D ECHO COMPLETE ADULT (TTE) W OR WO CONTR   
  
 12/15/2017 11:00 AM  
 Appointment with [facility]- IE33 MACHINE (WT ) at [facility] NON-INVASIVE CARD ([phone]) Age Limit for ALL Heart procedures @ all [facility] facilities: 18 yrs and older only. Under the age of 25, refer to [address] [phone]). Wt Limit: 350lbs. This study requires patient to bring a written physician's order or MD office may fax the order to Central Scheduling at [phone]. Patient needs to bring a current list of all medications. No preparation is required for this study. Patients should report 15 minutes prior to their appointment time to the [facility], [address] [place] [address]/[address]. Patient Instructions Continue current medications. No changes. If you have any further questions or concerns, please contact our office. [phone] Introducing [facility] & HEALTH SERVICES! [facility] introduces [employer] patient portal. Now you can access parts of your medical record, email your doctor's office, and request medication refills online. 1. In your internet browser, go to [email or web address] 2. Click on the First Time User? Click Here link in the Sign In box. You will see the New Member Sign Up page. 3. Enter your [employer] Access Code exactly as it appears below. You will not need to use this code after youve completed the sign-up process. If you do not sign up before the expiration date, you must request a new code. · [employer] Access Code: [email or web address] Expires: 2/11/2018 10:35 AM 
 
4. Enter the last four digits of your Social Security Number (xxxx) and Date of Birth (mm/dd/yyyy) as indicated and click Submit. You will be taken to the next sign-up page. 5. Create a [employer] ID. This will be your [employer] login ID and cannot be changed, so think of one that is secure and easy to remember. 6. Create a [employer] password. You can change your password at any time. 7. Enter your Password Reset Question and Answer.  This can be used at a later time if you forget your password. 8. Enter your e-mail address. You will receive e-mail notification when new information is available in [address] 9. Click Sign Up. You can now view and download portions of your medical record. 10. Click the Download Summary menu link to download a portable copy of your medical information. If you have questions, please visit the Frequently Asked Questions section of the [employer] website. Remember, [employer] is NOT to be used for urgent needs. For medical emergencies, dial 911. Now available from your iPhone and Android! Please provide this summary of care documentation to your next provider. Your primary care clinician is listed as Garrett Santos. If you have any questions after today's visit, please call [phone].

## 2017-11-28 NOTE — PROGRESS NOTES
Review of Systems   Constitutional: Negative for chills, fever, malaise/fatigue and weight loss. Respiratory: Positive for cough and shortness of breath. Negative for hemoptysis and wheezing. Cardiovascular: Positive for palpitations and leg swelling. Negative for chest pain and orthopnea. Gastrointestinal: Positive for heartburn. Negative for abdominal pain, blood in stool, constipation, diarrhea, melena, nausea and vomiting. Musculoskeletal: Negative for falls, joint pain and myalgias. Neurological: Negative for dizziness.

## 2017-12-15 ENCOUNTER — HOSPITAL ENCOUNTER (OUTPATIENT)
Dept: NON INVASIVE DIAGNOSTICS | Age: 72
Discharge: HOME | End: 2017-12-15
Attending: INTERNAL MEDICINE
Payer: MEDICARE

## 2017-12-15 DIAGNOSIS — I35.0 AORTIC VALVE STENOSIS, ETIOLOGY OF CARDIAC VALVE DISEASE UNSPECIFIED: ICD-10-CM

## 2017-12-15 PROCEDURE — 74011250636 HC RX REV CODE- 250/636: Performed by: INTERNAL MEDICINE

## 2017-12-15 PROCEDURE — C8929 TTE W OR WO FOL WCON,DOPPLER: HCPCS

## 2017-12-15 RX ADMIN — PERFLUTREN 2 ML: 6.52 INJECTION, SUSPENSION INTRAVENOUS at 12:13

## 2017-12-15 NOTE — PROGRESS NOTES
Completed echocardiogram with definity. Report to follow. I removed the band off and placed in shred box.        Arpita Garnett, RCS, RDCS

## 2017-12-19 NOTE — PROGRESS NOTES
Per your last note \" This patient appears to be doing reasonably well. He is not having any problems with exertional chest tightness currently although he always has some shortness of breath he thinks related to his persistent half a pack a day cigarette abuse. His aortic stenosis murmur does not sound more severe but I am going to repeat an echocardiogram on him to see how quickly his aortic stenosis is worsening and just to be sure there is no change in his overall left ventricular function.

## 2017-12-29 ENCOUNTER — TELEPHONE (OUTPATIENT)
Dept: CARDIOLOGY CLINIC | Age: 72
End: 2017-12-29

## 2017-12-29 NOTE — LETTER
12/29/2017 2:26 PM 
 
Mr. Odalys Hackett [facility] [address] [facility] [address] Odalys Hackett was seen in our office on 11/28/2017 for cardiac evaluation. From a cardiac standpoint he is at acceptable risk for Parotidectomy with Dr Farhana Castle , but should get anticoagulation plan from Dr Josephine Sandoval's office(PCP). Please feel free to contact our office if you have any questions regarding this patient.   
 
 
 
 
 
 
Sincerely, 
 
 
 
Khurram Gannon, DO

## 2017-12-29 NOTE — TELEPHONE ENCOUNTER
Patient called in earlier to [facility]. Wanted to know if he is ok to get his surgery and his ECHo results. Dr Marky Ya reviewed ECHO. Patient is in acceptable risk for surgery, anticoagulation is managed by Dr Magdalena Vasquez office, so they would have to clear him. ECHO was normal, except for aortic stenosis being mild to moderate-Dr Marky Ya will be monitoring in every 12 months, doesn't requiring anything. Patient states he has now and then SOB episodes. HE has documented PAF and it might be causing him symptoms. I advised him to take note of how often it happens and how long it is in duration. I advised him to call back out office if it will be happening more frequent.

## 2018-01-04 ENCOUNTER — TELEPHONE (OUTPATIENT)
Dept: CARDIOLOGY CLINIC | Age: 73
End: 2018-01-04

## 2018-01-04 NOTE — TELEPHONE ENCOUNTER
----- Message from Salazar Bueno RN sent at 12/19/2017  7:16 AM EST -----  Per your last note \" This patient appears to be doing reasonably well. He is not having any problems with exertional chest tightness currently although he always has some shortness of breath he thinks related to his persistent half a pack a day cigarette abuse.   His aortic stenosis murmur does not sound more severe but I am going to repeat an echocardiogram on him to see how quickly his aortic stenosis is worsening and just to be sure there is no change in his overall left ventricular function.

## 2018-01-04 NOTE — TELEPHONE ENCOUNTER
I called the patient about his echocardiogram.  His echocardiogram really shows only moderate aortic stenosis and it is no different than it was October 2016. He does have chronic atrial fibrillation and we just did discuss possible bridging but he hates to get needles and I think his risk is relatively low, so I think it is reasonable for him to come off of Coumadin for 5 days before the planned surgery and go back on it preferably the day of surgery is okay with Dr. Ana Wiseman. In view of his multiple comorbidities he still is a moderate increased cardiovascular risk but I certainly think it is acceptable. Please let Dr. Bledsoe Mast office know. He says his surgery is scheduled for the 15th.  ES

## 2018-01-08 RX ORDER — ISOSORBIDE MONONITRATE 60 MG/1
TABLET, EXTENDED RELEASE ORAL
Qty: 90 TAB | Refills: 3 | Status: SHIPPED | OUTPATIENT
Start: 2018-01-08 | End: 2019-01-13 | Stop reason: SDUPTHER

## 2018-01-10 RX ORDER — ALBUTEROL SULFATE 90 UG/1
2 AEROSOL, METERED RESPIRATORY (INHALATION)
COMMUNITY

## 2018-01-10 RX ORDER — LEVOTHYROXINE SODIUM 150 UG/1
150 TABLET ORAL DAILY
COMMUNITY
End: 2022-01-07

## 2018-01-11 ENCOUNTER — TELEPHONE (OUTPATIENT)
Dept: CARDIOLOGY CLINIC | Age: 73
End: 2018-01-11

## 2018-01-11 NOTE — TELEPHONE ENCOUNTER
Dr Rafat Singer called the office today requesting to speak to Dr Marv Sheets about patient's upcoming surgery. He requested that Dr Marv Sheets return his call on his cell # [phone]. Will forward to Dr Marv Sheets to make him aware.

## 2018-01-12 ENCOUNTER — ANESTHESIA EVENT (OUTPATIENT)
Dept: SURGERY | Age: 73
DRG: 139 | End: 2018-01-12
Payer: MEDICARE

## 2018-01-12 NOTE — TELEPHONE ENCOUNTER
Dr Marcelo Carreon talked to Dr July Dias this afternoon. [id number] [address] for patient to hold Coumadin 5 days prior to surgery.       Verbal order and read back per Quentin Haq DO

## 2018-01-15 ENCOUNTER — HOSPITAL ENCOUNTER (INPATIENT)
Age: 73
LOS: 1 days | Discharge: HOME | DRG: 139 | End: 2018-01-16
Attending: OTOLARYNGOLOGY | Admitting: OTOLARYNGOLOGY
Payer: MEDICARE

## 2018-01-15 ENCOUNTER — ANESTHESIA (OUTPATIENT)
Dept: SURGERY | Age: 73
DRG: 139 | End: 2018-01-15
Payer: MEDICARE

## 2018-01-15 DIAGNOSIS — Z90.49 H/O PAROTIDECTOMY: Primary | ICD-10-CM

## 2018-01-15 LAB
APTT PPP: 29.1 SEC (ref 23–36.4)
GLUCOSE BLD STRIP.AUTO-MCNC: 143 MG/DL (ref 70–110)
GLUCOSE BLD STRIP.AUTO-MCNC: 173 MG/DL (ref 70–110)
GLUCOSE BLD STRIP.AUTO-MCNC: 177 MG/DL (ref 70–110)
GLUCOSE BLD STRIP.AUTO-MCNC: 195 MG/DL (ref 70–110)
INR PPP: 1.1 (ref 0.8–1.2)
PROTHROMBIN TIME: 13.4 SEC (ref 11.5–15.2)

## 2018-01-15 PROCEDURE — 76010000132 HC OR TIME 2.5 TO 3 HR: Performed by: OTOLARYNGOLOGY

## 2018-01-15 PROCEDURE — 74011250637 HC RX REV CODE- 250/637: Performed by: NURSE ANESTHETIST, CERTIFIED REGISTERED

## 2018-01-15 PROCEDURE — 77030019605: Performed by: OTOLARYNGOLOGY

## 2018-01-15 PROCEDURE — 74011250636 HC RX REV CODE- 250/636

## 2018-01-15 PROCEDURE — 74011250636 HC RX REV CODE- 250/636: Performed by: NURSE ANESTHETIST, CERTIFIED REGISTERED

## 2018-01-15 PROCEDURE — 77030002916 HC SUT ETHLN J&J -A: Performed by: OTOLARYNGOLOGY

## 2018-01-15 PROCEDURE — 77030002996 HC SUT SLK J&J -A: Performed by: OTOLARYNGOLOGY

## 2018-01-15 PROCEDURE — 85610 PROTHROMBIN TIME: CPT | Performed by: ANESTHESIOLOGY

## 2018-01-15 PROCEDURE — 74011636637 HC RX REV CODE- 636/637: Performed by: NURSE ANESTHETIST, CERTIFIED REGISTERED

## 2018-01-15 PROCEDURE — 77030011640 HC PAD GRND REM COVD -A: Performed by: OTOLARYNGOLOGY

## 2018-01-15 PROCEDURE — 85730 THROMBOPLASTIN TIME PARTIAL: CPT | Performed by: ANESTHESIOLOGY

## 2018-01-15 PROCEDURE — 65270000029 HC RM PRIVATE

## 2018-01-15 PROCEDURE — 77030012407 HC DRN WND BARD -B: Performed by: OTOLARYNGOLOGY

## 2018-01-15 PROCEDURE — 77030008683 HC TU ET CUF COVD -A: Performed by: ANESTHESIOLOGY

## 2018-01-15 PROCEDURE — 88331 PATH CONSLTJ SURG 1 BLK 1SPC: CPT | Performed by: OTOLARYNGOLOGY

## 2018-01-15 PROCEDURE — 82962 GLUCOSE BLOOD TEST: CPT

## 2018-01-15 PROCEDURE — 77030034115 HC SHR ENDO COAG HARM FOCS J&J -F: Performed by: OTOLARYNGOLOGY

## 2018-01-15 PROCEDURE — 77030008467 HC STPLR SKN COVD -B: Performed by: OTOLARYNGOLOGY

## 2018-01-15 PROCEDURE — 74011250636 HC RX REV CODE- 250/636: Performed by: OTOLARYNGOLOGY

## 2018-01-15 PROCEDURE — 77030011267 HC ELECTRD BLD COVD -A: Performed by: OTOLARYNGOLOGY

## 2018-01-15 PROCEDURE — 77030032490 HC SLV COMPR SCD KNE COVD -B: Performed by: OTOLARYNGOLOGY

## 2018-01-15 PROCEDURE — 74011000250 HC RX REV CODE- 250: Performed by: OTOLARYNGOLOGY

## 2018-01-15 PROCEDURE — 77030013567 HC DRN WND RESERV BARD -A: Performed by: OTOLARYNGOLOGY

## 2018-01-15 PROCEDURE — 77030026438 HC STYL ET INTUB CARD -A: Performed by: ANESTHESIOLOGY

## 2018-01-15 PROCEDURE — 74011250637 HC RX REV CODE- 250/637: Performed by: OTOLARYNGOLOGY

## 2018-01-15 PROCEDURE — 88307 TISSUE EXAM BY PATHOLOGIST: CPT | Performed by: OTOLARYNGOLOGY

## 2018-01-15 PROCEDURE — 74011000250 HC RX REV CODE- 250

## 2018-01-15 PROCEDURE — 0CT90ZZ RESECTION OF LEFT PAROTID GLAND, OPEN APPROACH: ICD-10-PCS | Performed by: OTOLARYNGOLOGY

## 2018-01-15 PROCEDURE — 76210000016 HC OR PH I REC 1 TO 1.5 HR: Performed by: OTOLARYNGOLOGY

## 2018-01-15 PROCEDURE — 77030012623 HC STIM NRV HND MEDT -B: Performed by: OTOLARYNGOLOGY

## 2018-01-15 PROCEDURE — 77030031139 HC SUT VCRL2 J&J -A: Performed by: OTOLARYNGOLOGY

## 2018-01-15 PROCEDURE — 76060000036 HC ANESTHESIA 2.5 TO 3 HR: Performed by: OTOLARYNGOLOGY

## 2018-01-15 PROCEDURE — 77030027138 HC INCENT SPIROMETER -A

## 2018-01-15 PROCEDURE — 74011000258 HC RX REV CODE- 258

## 2018-01-15 RX ORDER — ISOSORBIDE MONONITRATE 30 MG/1
60 TABLET, EXTENDED RELEASE ORAL DAILY
Status: DISCONTINUED | OUTPATIENT
Start: 2018-01-16 | End: 2018-01-16 | Stop reason: HOSPADM

## 2018-01-15 RX ORDER — MELATONIN
2000 DAILY
Status: DISCONTINUED | OUTPATIENT
Start: 2018-01-16 | End: 2018-01-16 | Stop reason: HOSPADM

## 2018-01-15 RX ORDER — ONDANSETRON 2 MG/ML
4 INJECTION INTRAMUSCULAR; INTRAVENOUS ONCE
Status: DISPENSED | OUTPATIENT
Start: 2018-01-15 | End: 2018-01-16

## 2018-01-15 RX ORDER — SODIUM CHLORIDE 0.9 % (FLUSH) 0.9 %
5-10 SYRINGE (ML) INJECTION AS NEEDED
Status: DISCONTINUED | OUTPATIENT
Start: 2018-01-15 | End: 2018-01-16 | Stop reason: HOSPADM

## 2018-01-15 RX ORDER — SODIUM CHLORIDE, SODIUM LACTATE, POTASSIUM CHLORIDE, CALCIUM CHLORIDE 600; 310; 30; 20 MG/100ML; MG/100ML; MG/100ML; MG/100ML
75 INJECTION, SOLUTION INTRAVENOUS CONTINUOUS
Status: DISCONTINUED | OUTPATIENT
Start: 2018-01-16 | End: 2018-01-15

## 2018-01-15 RX ORDER — NITROGLYCERIN 0.4 MG/1
0.4 TABLET SUBLINGUAL
Status: DISCONTINUED | OUTPATIENT
Start: 2018-01-15 | End: 2018-01-16 | Stop reason: HOSPADM

## 2018-01-15 RX ORDER — SODIUM CHLORIDE, SODIUM LACTATE, POTASSIUM CHLORIDE, CALCIUM CHLORIDE 600; 310; 30; 20 MG/100ML; MG/100ML; MG/100ML; MG/100ML
75 INJECTION, SOLUTION INTRAVENOUS CONTINUOUS
Status: DISCONTINUED | OUTPATIENT
Start: 2018-01-15 | End: 2018-01-16 | Stop reason: HOSPADM

## 2018-01-15 RX ORDER — HYDROMORPHONE HYDROCHLORIDE 1 MG/ML
0.5 INJECTION, SOLUTION INTRAMUSCULAR; INTRAVENOUS; SUBCUTANEOUS
Status: DISCONTINUED | OUTPATIENT
Start: 2018-01-15 | End: 2018-01-16 | Stop reason: HOSPADM

## 2018-01-15 RX ORDER — INSULIN LISPRO 100 [IU]/ML
INJECTION, SOLUTION INTRAVENOUS; SUBCUTANEOUS ONCE
Status: COMPLETED | OUTPATIENT
Start: 2018-01-15 | End: 2018-01-15

## 2018-01-15 RX ORDER — NALOXONE HYDROCHLORIDE 0.4 MG/ML
0.4 INJECTION, SOLUTION INTRAMUSCULAR; INTRAVENOUS; SUBCUTANEOUS AS NEEDED
Status: DISCONTINUED | OUTPATIENT
Start: 2018-01-15 | End: 2018-01-16 | Stop reason: HOSPADM

## 2018-01-15 RX ORDER — ALBUTEROL SULFATE 90 UG/1
2 AEROSOL, METERED RESPIRATORY (INHALATION)
Status: DISCONTINUED | OUTPATIENT
Start: 2018-01-15 | End: 2018-01-15

## 2018-01-15 RX ORDER — FAMOTIDINE 20 MG/1
20 TABLET, FILM COATED ORAL ONCE
Status: COMPLETED | OUTPATIENT
Start: 2018-01-15 | End: 2018-01-15

## 2018-01-15 RX ORDER — SODIUM CHLORIDE, SODIUM LACTATE, POTASSIUM CHLORIDE, CALCIUM CHLORIDE 600; 310; 30; 20 MG/100ML; MG/100ML; MG/100ML; MG/100ML
75 INJECTION, SOLUTION INTRAVENOUS CONTINUOUS
Status: DISCONTINUED | OUTPATIENT
Start: 2018-01-16 | End: 2018-01-15 | Stop reason: SDUPTHER

## 2018-01-15 RX ORDER — ONDANSETRON 2 MG/ML
INJECTION INTRAMUSCULAR; INTRAVENOUS AS NEEDED
Status: DISCONTINUED | OUTPATIENT
Start: 2018-01-15 | End: 2018-01-15 | Stop reason: HOSPADM

## 2018-01-15 RX ORDER — SUCCINYLCHOLINE CHLORIDE 20 MG/ML
INJECTION INTRAMUSCULAR; INTRAVENOUS AS NEEDED
Status: DISCONTINUED | OUTPATIENT
Start: 2018-01-15 | End: 2018-01-15 | Stop reason: HOSPADM

## 2018-01-15 RX ORDER — DEXAMETHASONE SODIUM PHOSPHATE 4 MG/ML
INJECTION, SOLUTION INTRA-ARTICULAR; INTRALESIONAL; INTRAMUSCULAR; INTRAVENOUS; SOFT TISSUE AS NEEDED
Status: DISCONTINUED | OUTPATIENT
Start: 2018-01-15 | End: 2018-01-15 | Stop reason: HOSPADM

## 2018-01-15 RX ORDER — HYDROMORPHONE HYDROCHLORIDE 2 MG/ML
0.5 INJECTION, SOLUTION INTRAMUSCULAR; INTRAVENOUS; SUBCUTANEOUS
Status: DISCONTINUED | OUTPATIENT
Start: 2018-01-15 | End: 2018-01-15 | Stop reason: HOSPADM

## 2018-01-15 RX ORDER — HYDROCODONE BITARTRATE AND ACETAMINOPHEN 5; 325 MG/1; MG/1
1 TABLET ORAL
Status: DISCONTINUED | OUTPATIENT
Start: 2018-01-15 | End: 2018-01-16 | Stop reason: HOSPADM

## 2018-01-15 RX ORDER — SIMVASTATIN 40 MG/1
40 TABLET, FILM COATED ORAL DAILY
Status: DISCONTINUED | OUTPATIENT
Start: 2018-01-16 | End: 2018-01-16 | Stop reason: HOSPADM

## 2018-01-15 RX ORDER — ONDANSETRON 2 MG/ML
4 INJECTION INTRAMUSCULAR; INTRAVENOUS
Status: DISCONTINUED | OUTPATIENT
Start: 2018-01-15 | End: 2018-01-16 | Stop reason: HOSPADM

## 2018-01-15 RX ORDER — ALBUTEROL SULFATE 0.83 MG/ML
2.5 SOLUTION RESPIRATORY (INHALATION)
Status: DISCONTINUED | OUTPATIENT
Start: 2018-01-15 | End: 2018-01-16 | Stop reason: HOSPADM

## 2018-01-15 RX ORDER — SODIUM CHLORIDE 0.9 % (FLUSH) 0.9 %
5-10 SYRINGE (ML) INJECTION EVERY 8 HOURS
Status: DISCONTINUED | OUTPATIENT
Start: 2018-01-15 | End: 2018-01-16 | Stop reason: HOSPADM

## 2018-01-15 RX ORDER — LIDOCAINE HYDROCHLORIDE 20 MG/ML
INJECTION, SOLUTION EPIDURAL; INFILTRATION; INTRACAUDAL; PERINEURAL AS NEEDED
Status: DISCONTINUED | OUTPATIENT
Start: 2018-01-15 | End: 2018-01-15 | Stop reason: HOSPADM

## 2018-01-15 RX ORDER — HYDROMORPHONE HYDROCHLORIDE 2 MG/ML
INJECTION, SOLUTION INTRAMUSCULAR; INTRAVENOUS; SUBCUTANEOUS AS NEEDED
Status: DISCONTINUED | OUTPATIENT
Start: 2018-01-15 | End: 2018-01-15 | Stop reason: HOSPADM

## 2018-01-15 RX ORDER — METFORMIN HYDROCHLORIDE 500 MG/1
500 TABLET, EXTENDED RELEASE ORAL DAILY
Status: DISCONTINUED | OUTPATIENT
Start: 2018-01-16 | End: 2018-01-16 | Stop reason: HOSPADM

## 2018-01-15 RX ORDER — FENTANYL CITRATE 50 UG/ML
INJECTION, SOLUTION INTRAMUSCULAR; INTRAVENOUS AS NEEDED
Status: DISCONTINUED | OUTPATIENT
Start: 2018-01-15 | End: 2018-01-15 | Stop reason: HOSPADM

## 2018-01-15 RX ORDER — BACITRACIN ZINC 500 UNIT/G
OINTMENT (GRAM) TOPICAL 2 TIMES DAILY
Status: DISCONTINUED | OUTPATIENT
Start: 2018-01-15 | End: 2018-01-16 | Stop reason: HOSPADM

## 2018-01-15 RX ORDER — DEXTROSE 50 % IN WATER (D50W) INTRAVENOUS SYRINGE
25-50 AS NEEDED
Status: DISCONTINUED | OUTPATIENT
Start: 2018-01-15 | End: 2018-01-16 | Stop reason: HOSPADM

## 2018-01-15 RX ORDER — PROPOFOL 10 MG/ML
INJECTION, EMULSION INTRAVENOUS AS NEEDED
Status: DISCONTINUED | OUTPATIENT
Start: 2018-01-15 | End: 2018-01-15 | Stop reason: HOSPADM

## 2018-01-15 RX ORDER — MAGNESIUM SULFATE 100 %
4 CRYSTALS MISCELLANEOUS AS NEEDED
Status: DISCONTINUED | OUTPATIENT
Start: 2018-01-15 | End: 2018-01-16 | Stop reason: HOSPADM

## 2018-01-15 RX ORDER — LEVOTHYROXINE SODIUM 150 UG/1
150 TABLET ORAL DAILY
Status: DISCONTINUED | OUTPATIENT
Start: 2018-01-16 | End: 2018-01-16 | Stop reason: HOSPADM

## 2018-01-15 RX ORDER — PANTOPRAZOLE SODIUM 40 MG/1
40 TABLET, DELAYED RELEASE ORAL
Status: DISCONTINUED | OUTPATIENT
Start: 2018-01-16 | End: 2018-01-16 | Stop reason: HOSPADM

## 2018-01-15 RX ADMIN — FENTANYL CITRATE 100 MCG: 50 INJECTION, SOLUTION INTRAMUSCULAR; INTRAVENOUS at 10:59

## 2018-01-15 RX ADMIN — INSULIN LISPRO 3 UNITS: 100 INJECTION, SOLUTION INTRAVENOUS; SUBCUTANEOUS at 03:00

## 2018-01-15 RX ADMIN — SUCCINYLCHOLINE CHLORIDE 120 MG: 20 INJECTION INTRAMUSCULAR; INTRAVENOUS at 11:03

## 2018-01-15 RX ADMIN — SODIUM CHLORIDE, SODIUM LACTATE, POTASSIUM CHLORIDE, AND CALCIUM CHLORIDE 75 ML/HR: 600; 310; 30; 20 INJECTION, SOLUTION INTRAVENOUS at 17:40

## 2018-01-15 RX ADMIN — LIDOCAINE HYDROCHLORIDE 60 MG: 20 INJECTION, SOLUTION EPIDURAL; INFILTRATION; INTRACAUDAL; PERINEURAL at 11:03

## 2018-01-15 RX ADMIN — HYDROMORPHONE HYDROCHLORIDE 0.5 MG: 2 INJECTION, SOLUTION INTRAMUSCULAR; INTRAVENOUS; SUBCUTANEOUS at 11:27

## 2018-01-15 RX ADMIN — FENTANYL CITRATE 50 MCG: 50 INJECTION, SOLUTION INTRAMUSCULAR; INTRAVENOUS at 11:51

## 2018-01-15 RX ADMIN — HYDROMORPHONE HYDROCHLORIDE 0.5 MG: 2 INJECTION, SOLUTION INTRAMUSCULAR; INTRAVENOUS; SUBCUTANEOUS at 11:18

## 2018-01-15 RX ADMIN — FENTANYL CITRATE 50 MCG: 50 INJECTION, SOLUTION INTRAMUSCULAR; INTRAVENOUS at 13:34

## 2018-01-15 RX ADMIN — FAMOTIDINE 20 MG: 20 TABLET, FILM COATED ORAL at 09:19

## 2018-01-15 RX ADMIN — SODIUM CHLORIDE, SODIUM LACTATE, POTASSIUM CHLORIDE, AND CALCIUM CHLORIDE: 600; 310; 30; 20 INJECTION, SOLUTION INTRAVENOUS at 12:26

## 2018-01-15 RX ADMIN — BACITRACIN ZINC: 500 OINTMENT TOPICAL at 18:00

## 2018-01-15 RX ADMIN — PROPOFOL 150 MG: 10 INJECTION, EMULSION INTRAVENOUS at 11:03

## 2018-01-15 RX ADMIN — SODIUM CHLORIDE, SODIUM LACTATE, POTASSIUM CHLORIDE, AND CALCIUM CHLORIDE 75 ML/HR: 600; 310; 30; 20 INJECTION, SOLUTION INTRAVENOUS at 09:31

## 2018-01-15 RX ADMIN — INSULIN LISPRO 3 UNITS: 100 INJECTION, SOLUTION INTRAVENOUS; SUBCUTANEOUS at 17:31

## 2018-01-15 RX ADMIN — ONDANSETRON 4 MG: 2 INJECTION INTRAMUSCULAR; INTRAVENOUS at 10:59

## 2018-01-15 RX ADMIN — SODIUM CHLORIDE, SODIUM LACTATE, POTASSIUM CHLORIDE, AND CALCIUM CHLORIDE 75 ML/HR: 600; 310; 30; 20 INJECTION, SOLUTION INTRAVENOUS at 20:01

## 2018-01-15 RX ADMIN — DEXAMETHASONE SODIUM PHOSPHATE 8 MG: 4 INJECTION, SOLUTION INTRA-ARTICULAR; INTRALESIONAL; INTRAMUSCULAR; INTRAVENOUS; SOFT TISSUE at 11:13

## 2018-01-15 RX ADMIN — HYDROCODONE BITARTRATE AND ACETAMINOPHEN 1 TABLET: 5; 325 TABLET ORAL at 21:48

## 2018-01-15 NOTE — BRIEF OP NOTE
BRIEF OPERATIVE NOTE    Date of Procedure: 1/15/2018   Preoperative Diagnosis: D37.030  Postoperative Diagnosis: D37.030    Procedure(s):  PAROTIDECTOMY  Surgeon(s) and Role:     * iPng Navarro MD - Primary         Assistant Staff:       Surgical Staff:  Circ-1: Inocencia Sanabria RN  Circ-Relief: Peter Jolly RN  Scrub Tech-1: Armen Pruett  Scrub Tech-Relief: Toshia Mohr  Surg Asst-1: Iván Gerardo  Surg Asst-Relief: Arnie Marinelli  Event Time In   Incision Start 1126   Incision Close 8390     Anesthesia: General   Estimated Blood Loss:100**  Specimens:   ID Type Source Tests Collected by Time Destination   1 : left parotid mass Frozen Section Neck  Ping Navarro MD 1/15/2018 1252 Pathology   2 : deep inferior margin left parotid Preservative Neck  Ping Navarro MD 1/15/2018 1258 Pathology      Findings: warthin's tumor frozen section*   Complications:none**  Implants: * No implants in log *

## 2018-01-15 NOTE — ROUTINE PROCESS
Patient admitted to floor from PACU. He is awake and alert. No distress. Patient oriented to room and use of call bell.

## 2018-01-15 NOTE — ANESTHESIA PREPROCEDURE EVALUATION
Anesthetic History   No history of anesthetic complications            Review of Systems / Medical History  Patient summary reviewed and pertinent labs reviewed    Pulmonary          Smoker         Neuro/Psych   Within defined limits           Cardiovascular  Within defined limits  Hypertension        Dysrhythmias : atrial fibrillation  CAD    Exercise tolerance: <4 METS     GI/Hepatic/Renal  Within defined limits   GERD           Endo/Other  Within defined limits  Diabetes: type 2  Hypothyroidism       Other Findings   Comments:   Risk Factors for Postoperative nausea/vomiting:       History of postoperative nausea/vomiting? NO       Female? NO       Motion sickness? NO       Intended opioid administration for postoperative analgesia? YES      Smoking Abstinence  Current Smoker? YES  Elective Surgery? YES  Seen preoperatively by anesthesiologist or proxy prior to day of surgery? YES  Pt abstained from smoking 24 hours prior to anesthesia?  NO               Physical Exam    Airway  Mallampati: III  TM Distance: 4 - 6 cm  Neck ROM: normal range of motion   Mouth opening: Normal     Cardiovascular    Rhythm: irregular  Rate: abnormal         Dental    Dentition: Full upper dentures and Lower partial plate     Pulmonary  Breath sounds clear to auscultation               Abdominal  GI exam deferred       Other Findings            Anesthetic Plan    ASA: 3  Anesthesia type: general          Induction: Intravenous  Anesthetic plan and risks discussed with: Patient

## 2018-01-15 NOTE — ANESTHESIA POSTPROCEDURE EVALUATION
Post-Anesthesia Evaluation and Assessment    Patient: Magdaline Libman MRN: [id number]  SSN: [id number]    YOB: 1945  Age: 81 y.o. Sex: male       Cardiovascular Function/Vital Signs  Visit Vitals    /76 (BP 1 Location: Right arm, BP Patient Position: At rest)    Pulse 87    Temp 36.3 °C (97.4 °F)    Resp 20    Ht 5' 9\" (1.753 m)    Wt 109.5 kg (241 lb 8 oz)    SpO2 98%    BMI 35.66 kg/m2       Patient is status post general anesthesia for Procedure(s):  PAROTIDECTOMY. Nausea/Vomiting: None    Postoperative hydration reviewed and adequate. Pain:  Pain Scale 1: Numeric (0 - 10) (01/15/18 1354)  Pain Intensity 1: 0 (01/15/18 1354)   Managed    Neurological Status:   Neuro (WDL): Within Defined Limits (01/15/18 1354)   At baseline    Mental Status and Level of Consciousness: Arousable    Pulmonary Status:   O2 Device: Nasal cannula (01/15/18 1354)   Adequate oxygenation and airway patent    Complications related to anesthesia: None    Post-anesthesia assessment completed.  No concerns    Signed By: Edgar Ramos MD     January 15, 2018

## 2018-01-15 NOTE — IP AVS SNAPSHOT
[address] Patient: Christopher Calix MRN: [id number] TMK:[id number] About your hospitalization You were admitted on:  January 15, 2018 You last received care in the:  [facility] [address] 1978 You were discharged on:  January 16, 2018 Why you were hospitalized Your primary diagnosis was:  Not on File Your diagnoses also included:  H/O Parotidectomy Follow-up Information Follow up With Details Comments Contact Info Trena Kothari MD On 1/24/2018 Appointment at 1:30pm [address] [address] 
[phone] Artabelardo Miller MD On 1/18/2018 Appointment at 5:00pm for Drain Removal [address] [place] [address] [address] [place] [address] 
[phone] Discharge Orders None A check marcelle indicates which time of day the medication should be taken. My Medications START taking these medications Instructions Each Dose to Equal  
 Morning Noon Evening Bedtime  
 cephALEXin 500 mg capsule Commonly known as:  Louise Germain Your last dose was: Your next dose is: Take 1 Cap by mouth three (3) times daily for 7 days. 500 mg HYDROcodone-acetaminophen 5-325 mg per tablet Commonly known as:  Ney Lama Your last dose was: Your next dose is: Take 1 Tab by mouth every four (4) hours as needed. Max Daily Amount: 6 Tabs. 1 Tab CONTINUE taking these medications Instructions Each Dose to Equal  
 Morning Noon Evening Bedtime  
 albuterol 90 mcg/actuation inhaler Commonly known as:  PROVENTIL HFA, VENTOLIN HFA, PROAIR HFA Your last dose was: Your next dose is: Take 2 Puffs by inhalation every six (6) hours as needed for Wheezing. 2 Puff ANORO ELLIPTA 62.5-25 mcg/actuation inhaler Generic drug:  umeclidinium-vilanterol Your last dose was: Your next dose is: Take 1 Puff by inhalation daily. 1 Puff  
    
   
   
   
  
 cholecalciferol (vitamin D3) 2,000 unit Tab Your last dose was: Your next dose is: Take 1 Tab by mouth daily. 1 Tab  
    
   
   
   
  
 isosorbide mononitrate ER 60 mg CR tablet Commonly known as:  IMDUR Your last dose was: Your next dose is: TAKE 1 TABLET BY MOUTH  EVERY MORNING  
     
   
   
   
  
 levothyroxine 150 mcg tablet Commonly known as:  SYNTHROID Your last dose was: Your next dose is: Take 150 mcg by mouth daily. 150 mcg  
    
   
   
   
  
 metFORMIN  mg tablet Commonly known as:  GLUCOPHAGE XR Your last dose was: Your next dose is: Take 500 mg by mouth daily. 500 mg  
    
   
   
   
  
 metoprolol succinate 100 mg tablet Commonly known as:  TOPROL-XL Your last dose was: Your next dose is: Take 1.5 Tabs by mouth daily. 150 mg  
    
   
   
   
  
 nitroglycerin 0.4 mg SL tablet Commonly known as:  NITROSTAT Your last dose was: Your next dose is:    
   
   
 1 Tab by SubLINGual route every five (5) minutes as needed for Chest Pain. 0.4 mg  
    
   
   
   
  
 ONE DAILY GUMMY VITES PO Your last dose was: Your next dose is: Take  by mouth daily. pantoprazole 40 mg tablet Commonly known as:  PROTONIX Your last dose was: Your next dose is: Take 40 mg by mouth daily. 40 mg  
    
   
   
   
  
 simvastatin 40 mg tablet Commonly known as:  ZOCOR Your last dose was: Your next dose is: Take 40 mg by mouth daily. 40 mg  
    
   
   
   
  
  
STOP taking these medications   
 aspirin delayed-release 81 mg tablet warfarin 5 mg tablet Commonly known as:  COUMADIN Where to Get Your Medications Information on where to get these meds will be given to you by the nurse or doctor. ! Ask your nurse or doctor about these medications  
  cephALEXin 500 mg capsule HYDROcodone-acetaminophen 5-325 mg per tablet Discharge Instructions DISCHARGE SUMMARY from Nurse PATIENT INSTRUCTIONS: 
 
 
F-face looks uneven A-arms unable to move or move unevenly S-speech slurred or non-existent T-time-call 911 as soon as signs and symptoms begin-DO NOT go Back to bed or wait to see if you get better-TIME IS BRAIN. Warning Signs of HEART ATTACK Call 911 if you have these symptoms: 
? Chest discomfort. Most heart attacks involve discomfort in the center of the chest that lasts more than a few minutes, or that goes away and comes back. It can feel like uncomfortable pressure, squeezing, fullness, or pain. ? Discomfort in other areas of the upper body. Symptoms can include pain or discomfort in one or both arms, the back, neck, jaw, or stomach. ? Shortness of breath with or without chest discomfort. ? Other signs may include breaking out in a cold sweat, nausea, or lightheadedness. Don't wait more than five minutes to call [address]! Fast action can save your life. Calling 911 is almost always the fastest way to get lifesaving treatment. Emergency Medical Services staff can begin treatment when they arrive  up to an hour sooner than if someone gets to the hospital by car. SmartCloud Activation Thank you for requesting access to SmartCloud. Please follow the instructions below to securely access and download your online medical record.  SmartCloud allows you to send messages to your doctor, view your test results, renew your prescriptions, schedule appointments, and more. How Do I Sign Up? 1. In your internet browser, go to [email or web address]. 2. Click on the First Time User? Click Here link in the Sign In box. You will see the New Member Sign Up page. 3. Enter your Cinetraffic Access Code exactly as it appears below. You will not need to use this code after youve completed the sign-up process. If you do not sign up before the expiration date, you must request a new code. Cinetraffic Access Code: [email or web address] Expires: 2018 10:35 AM (This is the date your Cinetraffic access code will ) 4. Enter the last four digits of your Social Security Number (xxxx) and Date of Birth (mm/dd/yyyy) as indicated and click Submit. You will be taken to the next sign-up page. 5. Create a Cinetraffic ID. This will be your Cinetraffic login ID and cannot be changed, so think of one that is secure and easy to remember. 6. Create a Cinetraffic password. You can change your password at any time. 7. Enter your Password Reset Question and Answer. This can be used at a later time if you forget your password. 8. Enter your e-mail address. You will receive e-mail notification when new information is available in [address] 9. Click Sign Up. You can now view and download portions of your medical record. 10. Click the Download Summary menu link to download a portable copy of your medical information. Additional Information If you have questions, please visit the Frequently Asked Questions section of the Cinetraffic website at [email or web address] Remember, Cinetraffic is NOT to be used for urgent needs. For medical emergencies, dial 911. The discharge information has been reviewed with the patient. The patient verbalized understanding.  
Discharge medications reviewed with the patient and appropriate educational materials and side effects teaching were provided. ___Patient armband removed and shredded. ________________________________________________________________________________________________________________________________ Introducing [facility]! [employer] introduces LearnStreet patient portal. Now you can access parts of your medical record, email your doctor's office, and request medication refills online. 1. In your internet browser, go to [email or web address] 2. Click on the First Time User? Click Here link in the Sign In box. You will see the New Member Sign Up page. 3. Enter your LearnStreet Access Code exactly as it appears below. You will not need to use this code after youve completed the sign-up process. If you do not sign up before the expiration date, you must request a new code. · LearnStreet Access Code: [email or web address] Expires: 2/11/2018 10:35 AM 
 
4. Enter the last four digits of your Social Security Number (xxxx) and Date of Birth (mm/dd/yyyy) as indicated and click Submit. You will be taken to the next sign-up page. 5. Create a LearnStreet ID. This will be your LearnStreet login ID and cannot be changed, so think of one that is secure and easy to remember. 6. Create a LearnStreet password. You can change your password at any time. 7. Enter your Password Reset Question and Answer. This can be used at a later time if you forget your password. 8. Enter your e-mail address. You will receive e-mail notification when new information is available in [address] 9. Click Sign Up. You can now view and download portions of your medical record. 10. Click the Download Summary menu link to download a portable copy of your medical information. If you have questions, please visit the Frequently Asked Questions section of the LearnStreet website. Remember, LearnStreet is NOT to be used for urgent needs. For medical emergencies, dial 911. Now available from your iPhone and Android! Providers Seen During Your Hospitalization Provider Specialty Primary office phone Bhumi Escobar MD Otolaryngology [phone] Your Primary Care Physician (PCP) Primary Care Physician Office Phone Office Fax David Jennings [phone] You are allergic to the following No active allergies Recent Documentation Height Weight BMI Smoking Status 1.753 m 109.5 kg 35.66 kg/m2 Current Every Day Smoker Emergency Contacts Name Discharge Info Relation Home Work Mobile Tiffanie Collado DISCHARGE CAREGIVER [3] Spouse [3] [phone] Patient Belongings The following personal items are in your possession at time of discharge: 
  Dental Appliances: Lowers, Uppers  Visual Aid: None      Home Medications: None   Jewelry: None  Clothing: Undergarments, Jacket/Coat, Socks, Footwear, Shirt, Pants    Other Valuables: None Please provide this summary of care documentation to your next provider. Signatures-by signing, you are acknowledging that this After Visit Summary has been reviewed with you and you have received a copy. Patient Signature:  ____________________________________________________________ Date:  ____________________________________________________________  
  
Gem Burciaga Provider Signature:  ____________________________________________________________ Date:  ____________________________________________________________

## 2018-01-15 NOTE — PERIOP NOTES
TO2 @ 2 liters  TechRANSFER - OUT REPORT:    Verbal report given to Vladimir Sanford RN(name) on Ming Barillas  being transferred to [address] [place] [address](unit) for routine post - op       Report consisted of patients Situation, Background, Assessment and   Recommendations(SBAR). Information from the following report(s) SBAR, OR Summary, Procedure Summary, Intake/Output and MAR was reviewed with the receiving nurse. Lines:   Peripheral IV 01/15/18 Right Hand (Active)   Site Assessment Clean, dry, & intact 1/15/2018  1:54 PM   Phlebitis Assessment 0 1/15/2018  1:54 PM   Infiltration Assessment 0 1/15/2018  1:54 PM   Dressing Status Clean, dry, & intact 1/15/2018  1:54 PM   Dressing Type Transparent;Tape 1/15/2018  1:54 PM   Hub Color/Line Status Pink; Infusing 1/15/2018  1:54 PM   Action Taken Blood drawn 1/15/2018  9:30 AM        Opportunity for questions and clarification was provided.       Patient [id number] with:   O2 @ 2 liters  Tech

## 2018-01-15 NOTE — ROUTINE PROCESS
Bedside and Verbal shift change report given to IzaiahRN (oncoming nurse) by Jenny Tran RN (offgoing nurse). Report included the following information SBAR, Kardex, MAR and Recent Results. SITUATION:    Code Status: No Order   Reason for Admission: D37.030   H/O parotidectomy    [facility] day: 0   Problem List:       Hospital Problems  Date Reviewed: 11/28/2017          Codes Class Noted POA    H/O parotidectomy ICD-10-CM: Z90.49  ICD-9-CM: V15.29  1/15/2018 Unknown              BACKGROUND:    Past Medical History:   Past Medical History:   Diagnosis Date    Atrial fibrillation ([facility] 1998    CAD (coronary artery disease)     Cardiac nuclear imaging test, normal 04/01/2016    Low risk. No ischemia or prior infarction. EF 52%. Neg EKG on pharm stress test.    Diabetes ([facility]     DM (diabetes mellitus) ([facility]     GERD (gastroesophageal reflux disease)     Hematuria 2001    HLD (hyperlipidemia)     HTN (hypertension)     HTN (hypertension)     Hypothyroid     Pancreatitis          Patient taking anticoagulants no     ASSESSMENT:    Changes in Assessment Throughout Shift: no     Patient has Central Line: no Reasons if yes: .  Patient has Albarado Cath: no Reasons if yes: .  Last Vitals:     Vitals:    01/15/18 1447 01/15/18 1456 01/15/18 1458 01/15/18 1625   BP: 101/63 103/61 104/64 117/76   Pulse: 94 87 93 87   Resp: 13 14 16 20   Temp:    97.4 °F (36.3 °C)   SpO2: 97% 98% 98% 98%   Weight:       Height:            IV and DRAINS (will only show if present)   Cedric-Fuller Drain 01/15/18 Left; Lower Neck-Site Assessment: Clean, dry, & intact  Peripheral IV 01/15/18 Right Hand-Site Assessment: Clean, dry, & intact     WOUND (if present)   Wound Type: neck incision   Dressing present     Wound Concerns/Notes:  none     PAIN    Pain Assessment    Pain Intensity 1: 0 (01/15/18 1354)              Patient Stated Pain Goal: 0  o Interventions for Pain:  none  o Intervention effective: Raenell Mortimer o Time of last intervention: .   o Reassessment Completed: yes      Last 3 Weights:  Last 3 Recorded Weights in this Encounter    01/10/18 1620 01/15/18 0934   Weight: 108.9 kg (240 lb) 109.5 kg (241 lb 8 oz)     Weight change:      INTAKE/OUPUT    Current Shift: 01/15 0701 - 01/15 1900  In: 3600 [I.V.:3600]  Out: 350 [Urine:350]    Last three shifts:       LAB RESULTS   No results for input(s): WBC, HGB, HCT, PLT, HGBEXT, HCTEXT, PLTEXT in the last 72 hours. Recent Labs      01/15/18   0925   INR  1.1       RECOMMENDATIONS AND DISCHARGE PLANNING     1. Pending tests/procedures/ Plan of Care or Other Needs: no     2. Discharge plan for patient and Needs/Barriers: home    3. Estimated Discharge Date: . Posted on Whiteboard in [address] [place] [address] Room: . Raenell Mortimer 4. The patient's care plan was reviewed with the oncoming nurse. \"HEALS\" SAFETY CHECK      Fall Risk    Total Score: 1    Safety Measures:      A safety check occurred in the patient's room between off going nurse and oncoming nurse listed above. The safety check included the below items  Area Items   H  High Alert Medications - Verify all high alert medication drips (heparin, PCA, etc.)   E  Equipment - Suction is set up for ALL patients (with yanker)  - Red plugs utilized for all equipment (IV pumps, etc.)  - WOWs wiped down at end of shift.  - Room stocked with oxygen, suction, and other unit-specific supplies   A  Alarms - Bed alarm is set for fall risk patients  - Ensure chair alarm is in place and activated if patient is up in a chair   L  Lines - Check IV for any infiltration  - Albarado bag is empty if patient has a Albarado   - Tubing and IV bags are labeled   S  Safety   - Room is clean, patient is clean, and equipment is clean. - Hallways are clear from equipment besides carts.    - Fall bracelet on for fall risk patients  - Ensure room is clear and free of clutter  - Suction is set up for ALL patients (with yanker)  - Hallways are clear from equipment besides carts.    - Isolation precautions followed, supplies available outside room, sign posted     Betina Gonzales RN

## 2018-01-16 VITALS
HEIGHT: 69 IN | OXYGEN SATURATION: 96 % | TEMPERATURE: 97.9 F | SYSTOLIC BLOOD PRESSURE: 126 MMHG | RESPIRATION RATE: 18 BRPM | HEART RATE: 87 BPM | WEIGHT: 241.5 LBS | DIASTOLIC BLOOD PRESSURE: 80 MMHG | BODY MASS INDEX: 35.77 KG/M2

## 2018-01-16 LAB — GLUCOSE BLD STRIP.AUTO-MCNC: 156 MG/DL (ref 70–110)

## 2018-01-16 PROCEDURE — 82962 GLUCOSE BLOOD TEST: CPT

## 2018-01-16 PROCEDURE — 74011250637 HC RX REV CODE- 250/637: Performed by: OTOLARYNGOLOGY

## 2018-01-16 RX ORDER — CEPHALEXIN 500 MG/1
500 CAPSULE ORAL 3 TIMES DAILY
Qty: 21 CAP | Refills: 0 | Status: SHIPPED | OUTPATIENT
Start: 2018-01-16 | End: 2018-01-23

## 2018-01-16 RX ORDER — HYDROCODONE BITARTRATE AND ACETAMINOPHEN 5; 325 MG/1; MG/1
1 TABLET ORAL
Qty: 30 TAB | Refills: 0 | Status: SHIPPED | OUTPATIENT
Start: 2018-01-16 | End: 2018-03-14 | Stop reason: ALTCHOICE

## 2018-01-16 RX ADMIN — VITAMIN D, TAB 1000IU (100/BT) 2000 UNITS: 25 TAB at 08:11

## 2018-01-16 RX ADMIN — PANTOPRAZOLE SODIUM 40 MG: 40 TABLET, DELAYED RELEASE ORAL at 08:12

## 2018-01-16 RX ADMIN — METOPROLOL SUCCINATE 150 MG: 100 TABLET, EXTENDED RELEASE ORAL at 08:12

## 2018-01-16 RX ADMIN — SIMVASTATIN 40 MG: 40 TABLET, FILM COATED ORAL at 08:12

## 2018-01-16 RX ADMIN — HYDROCODONE BITARTRATE AND ACETAMINOPHEN 1 TABLET: 5; 325 TABLET ORAL at 06:01

## 2018-01-16 RX ADMIN — ISOSORBIDE MONONITRATE 60 MG: 30 TABLET, EXTENDED RELEASE ORAL at 08:11

## 2018-01-16 RX ADMIN — METFORMIN HYDROCHLORIDE 500 MG: 500 TABLET, EXTENDED RELEASE ORAL at 08:11

## 2018-01-16 RX ADMIN — LEVOTHYROXINE SODIUM 150 MCG: 150 TABLET ORAL at 08:12

## 2018-01-16 NOTE — ROUTINE PROCESS
Patient d/c'd home. No distress noted. D/C inst given and patient is able to empty and reset KAYLAH drain.

## 2018-01-16 NOTE — ROUTINE PROCESS
Bedside and Verbal shift change report given to Angelique Jaquez RN (oncoming nurse) by Joe Flores RN (offgoing nurse). Report included the following information SBAR, Kardex, MAR and Recent Results. SITUATION:    Code Status: No Order   Reason for Admission: D37.030   H/O parotidectomy    [facility] day: 1   Problem List:       Hospital Problems  Date Reviewed: 11/28/2017          Codes Class Noted POA    H/O parotidectomy ICD-10-CM: Z90.49  ICD-9-CM: V15.29  1/15/2018 Unknown              BACKGROUND:    Past Medical History:   Past Medical History:   Diagnosis Date    Atrial fibrillation ([facility] 1998    CAD (coronary artery disease)     Cardiac nuclear imaging test, normal 04/01/2016    Low risk. No ischemia or prior infarction. EF 52%. Neg EKG on pharm stress test.    Diabetes ([facility]     DM (diabetes mellitus) ([facility]     GERD (gastroesophageal reflux disease)     Hematuria 2001    HLD (hyperlipidemia)     HTN (hypertension)     HTN (hypertension)     Hypothyroid     Pancreatitis          Patient taking anticoagulants no     ASSESSMENT:    Changes in Assessment Throughout Shift: post-op     Patient has Central Line: no Reasons if yes:    Patient has Albarado Cath: no Reasons if yes:       Last Vitals:     Vitals:    01/15/18 1625 01/15/18 1925 01/16/18 0024 01/16/18 0439   BP: 117/76 113/76 130/77 125/87   Pulse: 87 84 87 85   Resp: 20 18 18 17   Temp: 97.4 °F (36.3 °C) 97 °F (36.1 °C) 97.5 °F (36.4 °C) 97.6 °F (36.4 °C)   SpO2: 98% 93% 95% 97%   Weight:       Height:            IV and DRAINS (will only show if present)   Cedric-Fuller Drain 01/15/18 Left; Lower Neck-Site Assessment: Clean, dry, & intact  Peripheral IV 01/15/18 Right Hand-Site Assessment: Clean, dry, & intact     WOUND (if present)   Wound Type:  neck   Dressing present Dressing Present : Yes   Wound Concerns/Notes:  none     PAIN    Pain Assessment    Pain Intensity 1: 2 (01/16/18 0636)    Pain Location 1: Head    Pain Intervention(s) 1: Medication (see MAR)    Patient Stated Pain Goal: 2  o Interventions for Pain:  See mar  o Intervention effective: yes  o Time of last intervention: see mar   o Reassessment Completed: yes      Last 3 Weights:  Last 3 Recorded Weights in this Encounter    01/10/18 1620 01/15/18 0934   Weight: 108.9 kg (240 lb) 109.5 kg (241 lb 8 oz)     Weight change:      INTAKE/OUPUT    Current Shift: 01/15 1901 - 01/16 0700  In: 967.5 [P.O.:480; I.V.:487.5]  Out: 400 [Urine:400]    Last three shifts: 01/14 0701 - 01/15 1900  In: 3600 [I.V.:3600]  Out: 550 [Urine:550]     LAB RESULTS   No results for input(s): WBC, HGB, HCT, PLT, HGBEXT, HCTEXT, PLTEXT in the last 72 hours. Recent Labs      01/15/18   0925   INR  1.1       RECOMMENDATIONS AND DISCHARGE PLANNING     1. Pending tests/procedures/ Plan of Care or Other Needs: none     2. Discharge plan for patient and Needs/Barriers: home    3. Estimated Discharge Date: today Posted on Whiteboard in [facility]: yes      4. The patient's care plan was reviewed with the oncoming nurse. \"HEALS\" SAFETY CHECK      Fall Risk    Total Score: 1    Safety Measures: Safety Measures: Bed/Chair-Wheels locked, Bed in low position, Caregiver at bedside, Call light within reach, Gripper socks, Side rails X 3    A safety check occurred in the patient's room between off going nurse and oncoming nurse listed above.     The safety check included the below items  Area Items   H  High Alert Medications - Verify all high alert medication drips (heparin, PCA, etc.)   E  Equipment - Suction is set up for ALL patients (with yanker)  - Red plugs utilized for all equipment (IV pumps, etc.)  - WOWs wiped down at end of shift.  - Room stocked with oxygen, suction, and other unit-specific supplies   A  Alarms - Bed alarm is set for fall risk patients  - Ensure chair alarm is in place and activated if patient is up in a chair   L  Lines - Check IV for any infiltration  - Albarado bag is empty if patient has a Albarado   - Tubing and IV bags are labeled   S  Safety   - Room is clean, patient is clean, and equipment is clean. - Hallways are clear from equipment besides carts. - Fall bracelet on for fall risk patients  - Ensure room is clear and free of clutter  - Suction is set up for ALL patients (with yanker)  - Hallways are clear from equipment besides carts.    - Isolation precautions followed, supplies available outside room, sign posted     Halima Castro RN

## 2018-01-16 NOTE — PHYSICIAN ADVISORY
Letter of admission status determination     Jessica Verdin   Age: 81 y.o. MRN: [id number]  Admitting physician: [id number]: Payor: Van Caron / Plan: [place] MEDICARE PART A & B / Product Type: Medicare /     Date of admission:  1/15/2018    I have reviewed this case as it involves a Medicare patient not meeting criteria for Inpatient services. The patient underwent elective parotidectomy yesterday. The procedure is not on the current Medicare Inpatient Only List. The patient tolerated the procedure well, without any documented complications, was monitored overnight and has remained stable. There were no unplanned/unexpected complications to warrant Observation or Inpatient status. Therefore, our recommendation is to place Jessica Verdin in Outpatient status. The final decision regarding the patient's hospitalization status depends on the attending physician's judgment.          Wai Almazan MD, JEFF, [address] [facility]  Physician Gama Singh.  [phone]    January 16, 2018   9:19 AM

## 2018-01-16 NOTE — DISCHARGE INSTRUCTIONS
DISCHARGE SUMMARY from Nurse    PATIENT INSTRUCTIONS:    After general anesthesia or intravenous sedation, for 24 hours or while taking prescription Narcotics:  · Limit your activities  · Do not drive and operate hazardous machinery  · Do not make important personal or business decisions  · Do  not drink alcoholic beverages  · If you have not urinated within 8 hours after discharge, please contact your surgeon on call. Report the following to your surgeon:  · Excessive pain, swelling, redness or odor of or around the surgical area  · Temperature over 100.5  · Nausea and vomiting lasting longer than 4 hours or if unable to take medications  · Any signs of decreased circulation or nerve impairment to extremity: change in color, persistent  numbness, tingling, coldness or increase pain  · Any questions    What to do at Home:  Recommended activity: Activity as tolerated. If you experience any of the following symptoms swelling of neck or throat; making it difficult to swallow or breath. , please follow up with . *  Please give a list of your current medications to your Primary Care Provider. *  Please update this list whenever your medications are discontinued, doses are      changed, or new medications (including over-the-counter products) are added. *  Please carry medication information at all times in case of emergency situations. These are general instructions for a healthy lifestyle:    No smoking/ No tobacco products/ Avoid exposure to second hand smoke  Surgeon General's Warning:  Quitting smoking now greatly reduces serious risk to your health.     Obesity, smoking, and sedentary lifestyle greatly increases your risk for illness    A healthy diet, regular physical exercise & weight monitoring are important for maintaining a healthy lifestyle    You may be retaining fluid if you have a history of heart failure or if you experience any of the following symptoms:  Weight gain of 3 pounds or more overnight or 5 pounds in a week, increased swelling in our hands or feet or shortness of breath while lying flat in bed. Please call your doctor as soon as you notice any of these symptoms; do not wait until your next office visit. Recognize signs and symptoms of STROKE:    F-face looks uneven    A-arms unable to move or move unevenly    S-speech slurred or non-existent    T-time-call 911 as soon as signs and symptoms begin-DO NOT go       Back to bed or wait to see if you get better-TIME IS BRAIN. Warning Signs of HEART ATTACK     Call 911 if you have these symptoms:   Chest discomfort. Most heart attacks involve discomfort in the center of the chest that lasts more than a few minutes, or that goes away and comes back. It can feel like uncomfortable pressure, squeezing, fullness, or pain.  Discomfort in other areas of the upper body. Symptoms can include pain or discomfort in one or both arms, the back, neck, jaw, or stomach.  Shortness of breath with or without chest discomfort.  Other signs may include breaking out in a cold sweat, nausea, or lightheadedness. Don't wait more than five minutes to call 911 - MINUTES MATTER! Fast action can save your life. Calling 911 is almost always the fastest way to get lifesaving treatment. Emergency Medical Services staff can begin treatment when they arrive -- up to an hour sooner than if someone gets to the hospital by car. [employer] Activation    Thank you for requesting access to [employer]. Please follow the instructions below to securely access and download your online medical record. [employer] allows you to send messages to your doctor, view your test results, renew your prescriptions, schedule appointments, and more. How Do I Sign Up? 1. In your internet browser, go to [email or web address]. 2. Click on the First Time User? Click Here link in the Sign In box. You will see the New Member Sign Up page.   3. Enter your [employer] Access Code exactly as it appears below. You will not need to use this code after youve completed the sign-up process. If you do not sign up before the expiration date, you must request a new code. [employer] Access Code: [email or web address]  Expires: 2018 10:35 AM (This is the date your [employer] access code will )    4. Enter the last four digits of your Social Security Number (xxxx) and Date of Birth (mm/dd/yyyy) as indicated and click Submit. You will be taken to the next sign-up page. 5. Create a [employer] ID. This will be your [employer] login ID and cannot be changed, so think of one that is secure and easy to remember. 6. Create a [employer] password. You can change your password at any time. 7. Enter your Password Reset Question and Answer. This can be used at a later time if you forget your password. 8. Enter your e-mail address. You will receive e-mail notification when new information is available in [address] 9. Click Sign Up. You can now view and download portions of your medical record. 10. Click the Download Summary menu link to download a portable copy of your medical information. Additional Information    If you have questions, please visit the Frequently Asked Questions section of the [employer] website at [email or web address] Remember, [employer] is NOT to be used for urgent needs. For medical emergencies, dial 911. The discharge information has been reviewed with the patient. The patient verbalized understanding. Discharge medications reviewed with the patient and appropriate educational materials and side effects teaching were provided. ___Patient armband removed and shredded. ________________________________________________________________________________________________________________________________

## 2018-01-17 NOTE — DISCHARGE SUMMARY
[address] Evangelina Goncalves  MR#: [id number]  : 1945  ACCOUNT #: [de-identified]   ADMIT DATE: 01/15/2018  DISCHARGE DATE: 2018    ADMISSION DIAGNOSIS:  Left parotid mass. DISCHARGE DIAGNOSIS:  Left parotid mass. DISCHARGE DIET:  As tolerated. DISCHARGE ACTIVITIES:  Light activity. SPECIAL INSTRUCTIONS:  The patient will follow up with me in two days' time for drain removal.    OPERATIONS PERFORMED:  1. Left parotidectomy. 2.  Myofascial flap from the sternocleidomastoid covering patient's facial nerve. HOSPITAL COURSE:  The patient underwent the above surgical procedure. No difficulties were encountered. The patient postoperatively was seen daily, was noted with some minimal weakness of a branch of the marginal mandibular nerve. However, the patient was eating well. Patient was subsequently discharged home on postoperative day #1. Patient will follow up with me on postoperative day #3 in the office, we will remove drain at that point. Patient will contact me if any difficulties arise.     DISPOSITION:  To home      Belkis Pompa MD       PM/MN  D: 2018 21:26     T: 2018 22:33  JOB #: [id number]

## 2018-01-17 NOTE — OP NOTES
[address] [place] [address]   OPERATIVE REPORT    Toney Kapoor  MR#: [id number]  : 1945  ACCOUNT #: [de-identified]   DATE OF SERVICE: 01/15/2018    PREOPERATIVE DIAGNOSIS:  Left parotid mass. POSTOPERATIVE DIAGNOSIS:  Left parotid mass. PROCEDURES PERFORMED:  1. Left superficial parotidectomy. 2.  Sternocleidomastoid flap for coverage of facial nerve. SURGEON:  Jose Cruz Vargas MD     ASSISTANT:  none*    ANESTHESIA:  General endotracheal anesthesia. ESTIMATED BLOOD LOSS:  75 mL. SPECIMENS REMOVED:  Specimen sent to Pathology. OPERATIVE FINDINGS:  1. Facial nerve found, identified and traced with good effect. 2.  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 COMPLICATIONS:  None. IMPLANTS:  none    DRAINS:  One Cedric-Fuller drain. OPERATIVE PROCEDURE:  The patient was brought to the operating room, placed supine on the operating table. General endotracheal anesthesia was given per anesthesiology department. Once the patient was sufficiently anesthetized, attention directed toward the left facial area. A tarsorrhaphy stitch was used to close the lid on the left side, this was removed at the end of the procedure. The patient's head was then turned to the right, the left facial area exposed. Patient was prepped and draped in the usual sterile fashion. Once this was accomplished, a standard parotidectomy incision was made on the pretragal area extending to the ear lobule, extending then posteriorly to the mastoid area and then sweeping 180 degrees anteriorly in a skin crease approximately 2 fingerbreadths below the angle of the mandible. The proper plane was identified and a fasciocutaneous flap was then elevated off the parotid. The edge of the parotid were then delineated.   The pretragal external auditory canal was  from the parotid tissue, this was dissected down to the level of the tragal pointer. Once this was accomplished, attention directed toward the inferior edges of the parotid gland. The parotid gland was  from the digastric muscle which was identified as well as the sternocleidomastoid. Once this was identified, the tissue between the tragal pointer and the superior border of the posterior belly of digastric was then  layer by layer until the facial nerve was identified. Once the facial nerve was identified, the facial nerve was then traced forth anteriorly, the pes was then identified as well as the inferior division. The inferior division was then traced out to the distal aspects of the parotid gland. No difficulties were encountered. The patient was noted to have two masses in the enclosed area. The parotid gland was  from the buccal, marginal mandibular and cervical branches of the facial nerve without difficulty. The Harmonic scalpel was valuable in controlling the blood loss. The posterior facial vein was also ligated with 2-0 silk suture. Once the specimen was removed, this was sent to Pathology. The frozen section determination was identified. At this point, the attention directed toward the wound itself. The patient was noted to have some close proximity of the facial nerve to the skin, it was decided to elevate a myofascial flap. A segment of the sternocleidomastoid was split and cut approximately 4 cm below the mastoid. The split edge was then used to rotate 180 degrees to cover the facial nerve. No difficulties were encountered. Patient tolerated this well. Valsalva maneuver was performed. Bleeding was controlled with use of the bipolar cautery and a drain was placed via a separate stab wound. Patient's drain was sutured in place. The fasciocutaneous flap and the skin were closed with 3-0 Vicryl and 5-0 nylon respectively, no difficulties were encountered.   Once this was complete, the patient's wound was dressed with bacitracin ointment. The patient was taken from the operating room to the recovery room in stable condition after correct needle and sponge count were obtained.       MD DIAZ Grier / MN  D: 01/16/2018 21:23     T: 01/16/2018 22:28  JOB #: [id number]

## 2018-01-18 ENCOUNTER — TELEPHONE (OUTPATIENT)
Dept: CASE MANAGEMENT | Age: 73
End: 2018-01-18

## 2018-03-14 ENCOUNTER — OFFICE VISIT (OUTPATIENT)
Dept: CARDIOLOGY CLINIC | Age: 73
End: 2018-03-14

## 2018-03-14 VITALS
SYSTOLIC BLOOD PRESSURE: 112 MMHG | DIASTOLIC BLOOD PRESSURE: 68 MMHG | HEIGHT: 69 IN | HEART RATE: 110 BPM | OXYGEN SATURATION: 98 % | WEIGHT: 244 LBS | BODY MASS INDEX: 36.14 KG/M2

## 2018-03-14 DIAGNOSIS — I25.110 CORONARY ARTERY DISEASE INVOLVING NATIVE CORONARY ARTERY OF NATIVE HEART WITH UNSTABLE ANGINA PECTORIS (HCC): Primary | ICD-10-CM

## 2018-03-14 DIAGNOSIS — I10 ESSENTIAL HYPERTENSION: ICD-10-CM

## 2018-03-14 DIAGNOSIS — J44.9 CHRONIC OBSTRUCTIVE PULMONARY DISEASE, UNSPECIFIED COPD TYPE (HCC): ICD-10-CM

## 2018-03-14 DIAGNOSIS — E78.5 HYPERLIPIDEMIA, UNSPECIFIED HYPERLIPIDEMIA TYPE: ICD-10-CM

## 2018-03-14 DIAGNOSIS — I48.19 PERSISTENT ATRIAL FIBRILLATION (HCC): ICD-10-CM

## 2018-03-14 NOTE — PROGRESS NOTES
1. Have you been to the ER, urgent care clinic since your last visit? Hospitalized since your last visit?no    2. Have you seen or consulted any other health care providers outside of the [address] [place] [address] since your last visit? Include any pap smears or colon screening.  no

## 2018-03-14 NOTE — PROGRESS NOTES
HPI: I saw Rahul Killian in my office today in cardiovascular evaluation regarding his chronic coronary artery disease, atrial fibrillation, and aortic valvular disease. Mr. Katerina Spicer is a pleasant 71 y.o. white male who I had not seen for 15-20 years when he presented to the office on 3/21/16 with the above-captioned problems. I had originally seen him for Dr. Naz Gamez for new onset atrial fibrillation many years ago. I tried to do a cardioversion at one time and that was unsuccessful and we decided to control his heart rate with beta-blockers and anticoagulate him with Coumadin.       He has been followed more recently by Dr. Nico Espino over the years, but began to have more shortness of breath and some chest tightness sounding anginal prompting him to come to see him on 3/21/16. Galina De Jesus did a nuclear myocardial perfusion study at that time which was read as low risk showing no signs of ischemia and low normal ejection fraction 52%. I increased his Toprol-XL and added Imdur to his regimen and he really did reasonably well until he came into my office on October 7, 2016 with a history of increasing shortness of breath and chest tightness with activity which is quite restricting and even though his nuclear myocardial perfusion study 6 months ago was negative his symptoms were so concerning that I decided to refer him directly for cardiac catheterization.      The cardiac catheterization was completed by Dr. Moreno Early on October 17, 2016 with findings of only moderate two-vessel coronary artery disease with a 70% mid LAD obstruction and a 60% mid circumflex obstructions neither of which were shown to have any hemodynamic significance with fractional flow reserve testing. He did have a mean gradient of 29 mmHg across the aortic valve which was difficult to obtain adequate values for in view of the atrial fibrillation suggesting moderate aortic stenosis.  He subsequently had an echocardiogram the same day which showed a mean gradient of 21 mmHg across the valve again suggesting mild to moderate aortic stenosis. When I saw him last on November 20, 2017 he was still having problems with exertional chest tightness along with his chronic shortness of breath but he was continued to smoke a half a pack of cigarettes per day. I subsequently decided to recheck an echocardiogram on him to assess the severity of his aortic stenosis which clinically did not appear to be severe and that echocardiogram showed a mean gradient of 20 mmHg across the valve consistent with only moderate aortic stenosis. He comes the office today and relates that his chest tightness has just been getting more more severe over the past few months. It is gotten to the point now that he gets chest tightness with any exertion. His family physician in general internist Dr. Colleen Love thought that maybe his problem was related to his lungs and got him off of his beta-blocker a week or 2 ago and adjusted his breathing medications including Pro-air. The patient relates that his problem has not improved if anything might be a little bit less. His heart rate today is significantly elevated at 100 to 120. Encounter Diagnoses   Name Primary?  Persistent atrial fibrillation (HCC) with RVR off beta blockers     Coronary artery disease involving native coronary artery of native heart with unstable angina pectoris (HCC) Yes    Essential hypertension     Hyperlipidemia, unspecified hyperlipidemia type     Chronic obstructive pulmonary disease, unspecified COPD type ([facility] [address]        Discussion: This gentleman appears to be having crescendo angina which may be partially related to the fact that he is been off of beta-blockers for a while and his heart rates are faster in atrial fibrillation.   He is in the 100-120 range currently so I am going to restart his Toprol-XL at 100 mg daily and I am going to go ahead and do a pharmacologic myocardial perfusion study to attempt to see if there is evidence of ischemia that would explain his symptoms and prompt a repeat cardiac catheterization. I will review his nuclear myocardial perfusion study with further recommendations following the review of that study and pending his course. PCP: Gómez Guzman MD      Past Medical History:   Diagnosis Date    Atrial fibrillation ([facility] [address] 1998    CAD (coronary artery disease)     Cardiac nuclear imaging test, normal 04/01/2016    Low risk. No ischemia or prior infarction. EF 52%. Neg EKG on pharm stress test.    Diabetes ([facility] [address]     DM (diabetes mellitus) ([facility] [address]     GERD (gastroesophageal reflux disease)     Hematuria 2001    HLD (hyperlipidemia)     HTN (hypertension)     HTN (hypertension)     Hypothyroid     Pancreatitis        Past Surgical History:   Procedure Laterality Date    CARDIAC CATHETERIZATION  10/17/2016         CORONARY ARTERY ANGIOGRAM  10/17/2016         FRACTIONAL FLOW RESERVE  10/17/2016         FRACTIONAL FLOW RESERVE ADDL  10/17/2016         HX CYST REMOVAL      x4    LV ANGIOGRAPHY  10/17/2016            Current Outpatient Rx   Name  Route  Sig  Dispense  Refill    isosorbide mononitrate ER (IMDUR) 60 mg CR tablet    Oral    Take 1 Tab by mouth every morning. 90 Tab    2      aspirin delayed-release 81 mg tablet    Oral    Take 81 mg by mouth daily.  aclidinium bromide (TUDORZA PRESSAIR) 400 mcg/actuation inhaler    Inhalation    Take 1 Puff by inhalation daily.  simvastatin (ZOCOR) 40 mg tablet    Oral    Take 40 mg by mouth nightly.  warfarin (COUMADIN) 5 mg tablet    Oral    Take 5 mg by mouth. As directed. Followed by PCP              metFORMIN ER (GLUCOPHAGE XR) 500 mg tablet    Oral    Take 500 mg by mouth daily (with dinner).  levothyroxine (SYNTHROID) 100 mcg tablet    Oral    Take 100 mcg by mouth Daily (before breakfast).               HYDROcodone-acetaminophen (XODOL) 5-300 mg tablet    Oral Take 1 Tab by mouth every six (6) hours as needed.  nitroglycerin (NITROSTAT) 0.4 mg SL tablet    SubLINGual    0.4 mg by SubLINGual route every five (5) minutes as needed for Chest Pain.  umeclidinium-vilanterol 62.5-25 mcg/actuation dsdv    Inhalation    Take 1 Puff by inhalation daily.  cholecalciferol, vitamin D3, 2,000 unit tab    Oral    Take 1 Tab by mouth daily.  metoprolol succinate (TOPROL-XL) 100 mg XL tablet    Oral    Take 1.5 Tabs by mouth daily. 60 Tab    3         No Known Allergies    Social History :  Social History   Substance Use Topics    Smoking status: Current Every Day Smoker     Packs/day: 0.50     Years: 52.00    Smokeless tobacco: Never Used    Alcohol use Yes      Comment: every other day        Family History: family history includes Diabetes in his father and mother; Stroke in his father. Review of Systems:   Constitutional: Negative for chills, fever, malaise/fatigue and weight loss. Respiratory: Negative for cough, hemoptysis, shortness of breath and wheezing. Cardiovascular: Negative for chest pain, palpitations, orthopnea and leg swelling. Gastrointestinal: Negative. Musculoskeletal: Negative for falls, joint pain and myalgias. Neurological: Negative for dizziness. Physical Exam:    The patient is a cooperative, alert, well developed, well nourished 81 y.o. white  male with full white beard who is in no acute distress at the time of the examination. Visit Vitals    /68    Pulse (!) 110    Ht 5' 9\" (1.753 m)    Wt 110.7 kg (244 lb)    SpO2 98%    BMI 36.03 kg/m2       HEENT: Conjuctiva white, mucosa moist, no pallor or cyanosis. Xanthelasmas on the medial aspect of both eyelids. NECK: Supple without masses, tenderness or thyromegaly. There was no jugular venous distention. Carotid are full bilaterally without bruits. CHEST: Symmetrical with good excursion.   LUNGS: Clear to auscultation in all fields. HEART: The apex is not displaced. There were no lifts, thrills or heaves. There is a normal S1 and S2. There is a grade 1-2/6 mid peaking systolic ejection murmur along left sternal border with radiation to the base without appreciable diastolic murmurs, rubs, clicks, or gallops auscultated. ABDOMEN: Soft without masses, tenderness or organomegaly. EXTREMITIES: Full peripheral pulses with trace peripheral edema. INTEGUMENT: Warm and dry   NEUROLOGICAL: The patient is oriented x 3 with motor function grossly intact. Review of Data: See PMH and Cardiology and Imaging sections for cardiac testing      Results for orders placed or performed in visit on 11/28/17   AMB POC EKG ROUTINE W/ 12 LEADS, INTER & REP     Status: None    Narrative    Atrial fibrillation with a controlled ventricular response rate around 80. Left axis deviation with possible left anterior fascicular block. Leftward hypertrophy by limb lead voltage criteria. Diffuse nonspecific inferolateral ST-T changes. Compared to the EKG of May 16, 2017 PVC noted at that time is not noted on the current tracing. Rhythm strip on 3/14/2018 demonstrated atrial fibrillation with underlying bundle branch block of indeterminate variety as well as left anterior fascicular block with an overall heart rate of approximately 100-120. Laurie Rico D.O., F.A.C.C. [facility]  [facility]  [place] [address]. [address] [facility]  [phone]    PLEASE NOTE:  This document has been produced using voice recognition software. Unrecognized errors in transcription may be present.

## 2018-03-14 NOTE — MR AVS SNAPSHOT
[address] [place] [address] [address] L[place] Garza [address] 
[phone] Patient: Lorene Anderson MRN: [id number] Visit Information Date & Time Provider Department Dept. Phone Encounter #  
 3/14/2018  3:20 PM Jamar Llamas [facility] [address] [id number] Your Appointments 6/25/2018 10:40 AM  
Follow Up with Enoc Robbins DO [facility] ([address]) Appt Note: 6 month follow up [place]ayla Garza [id number]  
[phone] [address] [place] [address] [address] Upcoming Health Maintenance Date Due Hepatitis C Screening 1945 FOOT EXAM Q1 4/3/1957 EYE EXAM RETINAL OR DILATED Q1 4/3/1957 DTaP/Tdap/Td series (1 - Tdap) 4/3/1968 ZOSTER VACCINE AGE 60> 2/3/2007 MICROALBUMIN Q1 11/4/2010 GLAUCOMA SCREENING Q2Y 4/3/2012 Pneumococcal 65+ Low/Medium Risk (1 of 2 - PCV13) 4/3/2012 MEDICARE YEARLY EXAM 4/3/2012 HEMOGLOBIN A1C Q6M 7/22/2016 FOBT Q 1 YEAR AGE 50-75 7/24/2016 LIPID PANEL Q1 4/25/2017 Influenza Age 5 to Adult 8/1/2017 Allergies as of 3/14/2018  Review Complete On: 3/14/2018 By: Enoc Robbins DO No Known Allergies Current Immunizations  Never Reviewed No immunizations on file. Not reviewed this visit You Were Diagnosed With   
  
 Codes Comments Chest pain, unspecified type    -  Primary ICD-10-CM: R07.9 ICD-9-CM: 786.50 Coronary artery disease involving native coronary artery of native heart without angina pectoris     ICD-10-CM: I25.10 ICD-9-CM: 414.01 Vitals BP Pulse Height(growth percentile) Weight(growth percentile) SpO2 BMI  
 112/68 (!) 110 5' 9\" (1.753 m) 244 lb (110.7 kg) 98% 36.03 kg/m2 Smoking Status Current Every Day Smoker Vitals History BMI and BSA Data Body Mass Index Body Surface Area 36.03 kg/m 2 2.32 m 2 Preferred Pharmacy Pharmacy Name Phone [address] [facility], [address] [address] Audrey Mcclelland Your Updated Medication List  
  
   
This list is accurate as of 3/14/18  4:31 PM.  Always use your most recent med list.  
  
  
  
  
 albuterol 90 mcg/actuation inhaler Commonly known as:  PROVENTIL HFA, VENTOLIN HFA, PROAIR HFA Take 2 Puffs by inhalation every six (6) hours as needed for Wheezing. ANORO ELLIPTA 62.5-25 mcg/actuation inhaler Generic drug:  umeclidinium-vilanterol Take 1 Puff by inhalation daily. cholecalciferol (vitamin D3) 2,000 unit Tab Take 1 Tab by mouth daily. isosorbide mononitrate ER 60 mg CR tablet Commonly known as:  IMDUR  
TAKE 1 TABLET BY MOUTH  EVERY MORNING  
  
 levothyroxine 150 mcg tablet Commonly known as:  SYNTHROID Take 150 mcg by mouth daily. metFORMIN  mg tablet Commonly known as:  GLUCOPHAGE XR Take 500 mg by mouth daily. metoprolol succinate 100 mg tablet Commonly known as:  TOPROL-XL Take 1.5 Tabs by mouth daily. nitroglycerin 0.4 mg SL tablet Commonly known as:  NITROSTAT  
1 Tab by SubLINGual route every five (5) minutes as needed for Chest Pain. ONE DAILY GUMMY VITES PO Take  by mouth daily. pantoprazole 40 mg tablet Commonly known as:  PROTONIX Take 40 mg by mouth daily. simvastatin 40 mg tablet Commonly known as:  ZOCOR Take 40 mg by mouth daily. To-Do List   
 03/14/2018 ECG:  CARDIAC SPECT REST AND STRESS   
  
 03/16/2018 ECG:  NUCLEAR STRESS TEST   
  
 03/22/2018 1:00 PM  
  Appointment with [address] [place] [address] at [address] [place] [address] ([phone]) 1.  Do NOT use any inhaled medications 24 hours prior to your breathing test.   2. Do NOT eat a heavy meal or smoke any tobacco products two hours prior to the appointment time. 3. Dress in loose, comfortable clothing. 4. Bring your photo ID, insurance cards and, if provided, the written physician order. 5. Report to the Patient Registration department on the first floor 15-20 minutes prior to your appointment time to check in.   6. If you have questions or need to reschedule, please call [phone]. Introducing [facility]! Leny Salazar introduces Linkagoal patient portal. Now you can access parts of your medical record, email your doctor's office, and request medication refills online. 1. In your internet browser, go to [email or web address] 2. Click on the First Time User? Click Here link in the Sign In box. You will see the New Member Sign Up page. 3. Enter your Linkagoal Access Code exactly as it appears below. You will not need to use this code after youve completed the sign-up process. If you do not sign up before the expiration date, you must request a new code. · Linkagoal Access Code: [email or web address] Expires: 6/12/2018 10:19 AM 
 
4. Enter the last four digits of your Social Security Number (xxxx) and Date of Birth (mm/dd/yyyy) as indicated and click Submit. You will be taken to the next sign-up page. 5. Create a Linkagoal ID. This will be your Linkagoal login ID and cannot be changed, so think of one that is secure and easy to remember. 6. Create a Linkagoal password. You can change your password at any time. 7. Enter your Password Reset Question and Answer. This can be used at a later time if you forget your password. 8. Enter your e-mail address. You will receive e-mail notification when new information is available in [address] 9. Click Sign Up. You can now view and download portions of your medical record. 10. Click the Download Summary menu link to download a portable copy of your medical information.  
 
If you have questions, please visit the Frequently Asked Questions section of the Digital Vault. Remember, [employer]lip[employer] is NOT to be used for urgent needs. For medical emergencies, dial 911. Now available from your iPhone and Android! Please provide this summary of care documentation to your next provider. Your primary care clinician is listed as Andrews Vazquez. If you have any questions after today's visit, please call [phone].

## 2018-03-16 ENCOUNTER — HOSPITAL ENCOUNTER (OUTPATIENT)
Dept: NON INVASIVE DIAGNOSTICS | Age: 73
Discharge: HOME | End: 2018-03-16
Attending: INTERNAL MEDICINE
Payer: MEDICARE

## 2018-03-16 DIAGNOSIS — I25.110 CORONARY ARTERY DISEASE INVOLVING NATIVE CORONARY ARTERY OF NATIVE HEART WITH UNSTABLE ANGINA PECTORIS (HCC): ICD-10-CM

## 2018-03-16 LAB
ATTENDING PHYSICIAN, CST07: NORMAL
DIAGNOSIS, 93000: NORMAL
DUKE TM SCORE RESULT, CST14: NORMAL
DUKE TREADMILL SCORE, CST13: NORMAL
ECG INTERP BEFORE EX, CST11: NORMAL
ECG INTERP DURING EX, CST12: NORMAL
FUNCTIONAL CAPACITY, CST17: NORMAL
KNOWN CARDIAC CONDITION, CST08: NORMAL
MAX. DIASTOLIC BP, CST04: 88 MMHG
MAX. HEART RATE, CST05: 129 BPM
MAX. SYSTOLIC BP, CST03: 134 MMHG
OVERALL BP RESPONSE TO EXERCISE, CST16: NORMAL
OVERALL HR RESPONSE TO EXERCISE, CST15: NORMAL
PEAK EX METS, CST10: 1 METS
PROTOCOL NAME, CST01: NORMAL
TEST INDICATION, CST09: NORMAL

## 2018-03-16 PROCEDURE — 93017 CV STRESS TEST TRACING ONLY: CPT | Performed by: INTERNAL MEDICINE

## 2018-03-16 PROCEDURE — A9500 TC99M SESTAMIBI: HCPCS

## 2018-03-16 PROCEDURE — 74011250636 HC RX REV CODE- 250/636: Performed by: INTERNAL MEDICINE

## 2018-03-16 PROCEDURE — 78452 HT MUSCLE IMAGE SPECT MULT: CPT | Performed by: INTERNAL MEDICINE

## 2018-03-16 RX ORDER — SODIUM CHLORIDE 9 MG/ML
250 INJECTION, SOLUTION INTRAVENOUS ONCE
Status: COMPLETED | OUTPATIENT
Start: 2018-03-16 | End: 2018-03-16

## 2018-03-16 RX ADMIN — SODIUM CHLORIDE 250 ML/HR: 900 INJECTION, SOLUTION INTRAVENOUS at 10:30

## 2018-03-16 RX ADMIN — REGADENOSON 0.4 MG: 0.08 INJECTION, SOLUTION INTRAVENOUS at 10:40

## 2018-03-16 NOTE — PROGRESS NOTES
Patient was given 10.4 mCi of Sestamibi for the Resting pictures. Patient received 0.4 mg of Lexiscan for the exercise portion of the Stress test. Patient was then given 33 mCi of Sestamibi for the Stress pictures. Armband was removed and disposed of before the patient left.

## 2018-03-16 NOTE — PROCEDURES
[address]  MR#: [id number]  : 1945  ACCOUNT #: [de-identified]   DATE OF SERVICE: 2018    PROCEDURE:  Pharmacological cardiac nuclear stress test.     INDICATIONS:  Chest pain. BASELINE EKG:  Atrial fibrillation with controlled rate, left axis deviation, intraventricular conduction delay. PROTOCOL:  The patient was given Lexiscan infusion through a right arm IV; 10 mCi sestamibi injected before rest and 33 mCi of sestamibi was injected before stress. Gated processing was performed. EKG FINDINGS:  The patient remained in atrial fibrillation. No acute changes beyond baseline. NUCLEAR FINDINGS:  Left ventricular systolic function is mildly depressed calculated at 45%. No evidence of transient ischemic dilatation or regional wall motion Abnormalities. Perfusion imaging is normal without evidence of ischemia or previous infarct. IMPRESSION:  1. Intermediate risk pharmacological cardiac nuclear stress test primarily due to a mildly depressed left ventricular systolic function calculated at 45%. 2.  No focal ischemia or previous infarct based on perfusion imaging. 3.  Baseline atrial fibrillation with controlled ventricular rate.       MD ABELARDO Chavez / MOON  D: 2018 13:04     T: 2018 13:27  JOB #: [id number]

## 2018-03-20 NOTE — PROGRESS NOTES
Per your last office note, \"This gentleman appears to be having crescendo angina which may be partially related to the fact that he is been off of beta-blockers for a while and his heart rates are faster in atrial fibrillation. He is in the 100-120 range currently so I am going to restart his Toprol-XL at 100 mg daily and I am going to go ahead and do a pharmacologic myocardial perfusion study to attempt to see if there is evidence of ischemia that would explain his symptoms and prompt a repeat cardiac catheterization. \"

## 2018-03-21 ENCOUNTER — TELEPHONE (OUTPATIENT)
Dept: CARDIOLOGY CLINIC | Age: 73
End: 2018-03-21

## 2018-03-21 DIAGNOSIS — I25.10 CORONARY ARTERY DISEASE INVOLVING NATIVE CORONARY ARTERY OF NATIVE HEART WITHOUT ANGINA PECTORIS: ICD-10-CM

## 2018-03-21 DIAGNOSIS — I48.19 PERSISTENT ATRIAL FIBRILLATION (HCC): Primary | ICD-10-CM

## 2018-03-21 DIAGNOSIS — I10 ESSENTIAL HYPERTENSION: ICD-10-CM

## 2018-03-21 NOTE — TELEPHONE ENCOUNTER
----- Message from Cindy Marmolejo LPN sent at 9/99/5619  9:06 AM EDT -----  Per your last office note, \"This gentleman appears to be having crescendo angina which may be partially related to the fact that he is been off of beta-blockers for a while and his heart rates are faster in atrial fibrillation. He is in the 100-120 range currently so I am going to restart his Toprol-XL at 100 mg daily and I am going to go ahead and do a pharmacologic myocardial perfusion study to attempt to see if there is evidence of ischemia that would explain his symptoms and prompt a repeat cardiac catheterization. \"

## 2018-03-21 NOTE — TELEPHONE ENCOUNTER
I called and discussed the results of the study with patient. The patient study demonstrated no signs of ischemia but it did suggest some decrease in his overall left ventricular function with ejection fraction 45% making it intermediate risk. This was down from 52% at the time of his study in April of 2016. He came off of beta-blockers for a while and his heart rate was about 120 when I saw him in the office consequently he may have developed some LV dysfunction over time and he could have developed a component of heart failure. I would like to get a BMP and a BNP on him.   Please put that into the computer for him to get a BMP and a BNP over at [facility] where he is going tomorrow for a pulmonary function test. ES

## 2018-03-22 ENCOUNTER — HOSPITAL ENCOUNTER (OUTPATIENT)
Dept: RESPIRATORY THERAPY | Age: 73
Discharge: HOME | End: 2018-03-22
Attending: INTERNAL MEDICINE
Payer: MEDICARE

## 2018-03-22 ENCOUNTER — HOSPITAL ENCOUNTER (OUTPATIENT)
Dept: LAB | Age: 73
Discharge: HOME | End: 2018-03-22
Attending: INTERNAL MEDICINE
Payer: MEDICARE

## 2018-03-22 DIAGNOSIS — I10 ESSENTIAL HYPERTENSION: ICD-10-CM

## 2018-03-22 DIAGNOSIS — I25.10 CORONARY ARTERY DISEASE INVOLVING NATIVE CORONARY ARTERY OF NATIVE HEART WITHOUT ANGINA PECTORIS: ICD-10-CM

## 2018-03-22 DIAGNOSIS — I48.19 PERSISTENT ATRIAL FIBRILLATION (HCC): ICD-10-CM

## 2018-03-22 DIAGNOSIS — R06.02 SHORTNESS OF BREATH: ICD-10-CM

## 2018-03-22 LAB
ANION GAP SERPL CALC-SCNC: 11 MMOL/L (ref 3–18)
BNP SERPL-MCNC: 994 PG/ML (ref 0–900)
BUN SERPL-MCNC: 19 MG/DL (ref 7–18)
BUN/CREAT SERPL: 15 (ref 12–20)
CALCIUM SERPL-MCNC: 9.5 MG/DL (ref 8.5–10.1)
CHLORIDE SERPL-SCNC: 103 MMOL/L (ref 100–108)
CO2 SERPL-SCNC: 27 MMOL/L (ref 21–32)
CREAT SERPL-MCNC: 1.29 MG/DL (ref 0.6–1.3)
GLUCOSE SERPL-MCNC: 206 MG/DL (ref 74–99)
POTASSIUM SERPL-SCNC: 4.5 MMOL/L (ref 3.5–5.5)
SODIUM SERPL-SCNC: 141 MMOL/L (ref 136–145)

## 2018-03-22 PROCEDURE — 36415 COLL VENOUS BLD VENIPUNCTURE: CPT | Performed by: INTERNAL MEDICINE

## 2018-03-22 PROCEDURE — 94060 EVALUATION OF WHEEZING: CPT

## 2018-03-22 PROCEDURE — 94729 DIFFUSING CAPACITY: CPT

## 2018-03-22 PROCEDURE — 94726 PLETHYSMOGRAPHY LUNG VOLUMES: CPT

## 2018-03-22 PROCEDURE — 80048 BASIC METABOLIC PNL TOTAL CA: CPT | Performed by: INTERNAL MEDICINE

## 2018-03-22 PROCEDURE — 83880 ASSAY OF NATRIURETIC PEPTIDE: CPT | Performed by: INTERNAL MEDICINE

## 2018-03-23 NOTE — PROGRESS NOTES
Per the last telephone encounter, \"He came off of beta-blockers for a while and his heart rate was about 120 when I saw him in the office consequently he may have developed some LV dysfunction over time and he could have developed a component of heart failure. I would like to get a BMP and a BNP on him.   Please put that into the computer for him to get a BMP and a BNP over at [facility] where he is going tomorrow for a pulmonary function test. ES\"

## 2018-03-23 NOTE — PROGRESS NOTES
This gentleman just has a very mild elevation of his BNP which could potentially suggest component of heart failure. He might benefit from diuresis so I think we should start him on Lasix just 20 mg daily and have him watch his daily weights and see if this weight comes down pounds in the next couple of weeks whether or not this helps with his shortness of breath with exertion and any chest pain issues along with a beta-blocker. .  I think we have him scheduled to see me in June I would probably just make the change addition of the Lasix and an appointment to see me sometime at the end of April early May. Certainly if his symptoms worsen between now and then is to let us know and I may have to see him more acutely. Please let the patient know.  ES

## 2018-03-26 ENCOUNTER — TELEPHONE (OUTPATIENT)
Dept: CARDIOLOGY CLINIC | Age: 73
End: 2018-03-26

## 2018-03-26 RX ORDER — FUROSEMIDE 20 MG/1
20 TABLET ORAL DAILY
Qty: 30 TAB | Refills: 6 | Status: CANCELLED | OUTPATIENT
Start: 2018-03-26

## 2018-03-26 NOTE — TELEPHONE ENCOUNTER
----- Message from [employer] DO Severino sent at 3/23/2018 11:14 AM EDT -----  This gentleman just has a very mild elevation of his BNP which could potentially suggest component of heart failure. He might benefit from diuresis so I think we should start him on Lasix just 20 mg daily and have him watch his daily weights and see if this weight comes down pounds in the next couple of weeks whether or not this helps with his shortness of breath with exertion and any chest pain issues along with a beta-blocker. .  I think we have him scheduled to see me in June I would probably just make the change addition of the Lasix and an appointment to see me sometime at the end of April early May. Certainly if his symptoms worsen between now and then is to let us know and I may have to see him more acutely. Please let the patient know.  ES

## 2018-03-26 NOTE — TELEPHONE ENCOUNTER
Called patient. Advised him of below results. Patient aware to start new diuretic. Patient requests it go to [facility].  Will send in new rx for patient to . Moved patient appt up to 4/25/18 per Dr India Fountain.       Verbal order and read back per Misbah Kirby DO

## 2018-03-29 RX ORDER — FUROSEMIDE 20 MG/1
20 TABLET ORAL DAILY
Qty: 30 TAB | Refills: 6 | Status: SHIPPED | OUTPATIENT
Start: 2018-03-29 | End: 2018-04-20 | Stop reason: SDUPTHER

## 2018-04-20 RX ORDER — FUROSEMIDE 20 MG/1
20 TABLET ORAL DAILY
Qty: 90 TAB | Refills: 1 | Status: SHIPPED | OUTPATIENT
Start: 2018-04-20 | End: 2018-04-25 | Stop reason: SDUPTHER

## 2018-04-25 ENCOUNTER — OFFICE VISIT (OUTPATIENT)
Dept: CARDIOLOGY CLINIC | Age: 73
End: 2018-04-25

## 2018-04-25 VITALS
DIASTOLIC BLOOD PRESSURE: 62 MMHG | HEIGHT: 69 IN | WEIGHT: 237 LBS | SYSTOLIC BLOOD PRESSURE: 106 MMHG | OXYGEN SATURATION: 97 % | BODY MASS INDEX: 35.1 KG/M2 | HEART RATE: 96 BPM

## 2018-04-25 DIAGNOSIS — I50.33 ACUTE ON CHRONIC DIASTOLIC CONGESTIVE HEART FAILURE (HCC): ICD-10-CM

## 2018-04-25 DIAGNOSIS — I48.19 PERSISTENT ATRIAL FIBRILLATION (HCC): ICD-10-CM

## 2018-04-25 DIAGNOSIS — I25.10 CORONARY ARTERY DISEASE INVOLVING NATIVE CORONARY ARTERY OF NATIVE HEART WITHOUT ANGINA PECTORIS: Primary | ICD-10-CM

## 2018-04-25 DIAGNOSIS — E11.21 TYPE 2 DIABETES WITH NEPHROPATHY (HCC): ICD-10-CM

## 2018-04-25 DIAGNOSIS — I10 ESSENTIAL HYPERTENSION: ICD-10-CM

## 2018-04-25 DIAGNOSIS — R06.02 SOB (SHORTNESS OF BREATH) ON EXERTION: ICD-10-CM

## 2018-04-25 DIAGNOSIS — E78.5 HYPERLIPIDEMIA, UNSPECIFIED HYPERLIPIDEMIA TYPE: ICD-10-CM

## 2018-04-25 PROBLEM — E66.01 SEVERE OBESITY (BMI 35.0-39.9) WITH COMORBIDITY (HCC): Status: ACTIVE | Noted: 2018-04-25

## 2018-04-25 RX ORDER — FUROSEMIDE 20 MG/1
20 TABLET ORAL DAILY
Qty: 90 TAB | Refills: 3 | Status: SHIPPED | OUTPATIENT
Start: 2018-04-25 | End: 2019-03-21 | Stop reason: SDUPTHER

## 2018-04-25 NOTE — MR AVS SNAPSHOT
[address] [address] [address] [address] [phone] Patient: Mordecai Oppenheim MRN: [id number] UUU:[id number] Visit Information Date & Time Provider Department Dept. Phone Encounter #  
 4/25/2018  2:40 PM Jackie Callahan, [address] [facility] [address] [id number] Follow-up Instructions Return in about 3 months (around 7/25/2018). Upcoming Health Maintenance Date Due Hepatitis C Screening 1945 FOOT EXAM Q1 4/3/1957 EYE EXAM RETINAL OR DILATED Q1 4/3/1957 DTaP/Tdap/Td series (1 - Tdap) 4/3/1968 ZOSTER VACCINE AGE 60> 2/3/2007 MICROALBUMIN Q1 11/4/2010 GLAUCOMA SCREENING Q2Y 4/3/2012 Pneumococcal 65+ Low/Medium Risk (1 of 2 - PCV13) 4/3/2012 HEMOGLOBIN A1C Q6M 7/22/2016 FOBT Q 1 YEAR AGE 50-75 7/24/2016 LIPID PANEL Q1 4/25/2017 Influenza Age 5 to Adult 8/1/2017 MEDICARE YEARLY EXAM 3/19/2018 Allergies as of 4/25/2018  Review Complete On: 4/25/2018 By: Jackie Callahan, DO No Known Allergies Current Immunizations  Never Reviewed No immunizations on file. Not reviewed this visit Vitals BP Pulse Height(growth percentile) Weight(growth percentile) SpO2 BMI  
 106/62 86 5' 9\" (1.753 m) 237 lb (107.5 kg) 97% 35 kg/m2 Smoking Status Current Every Day Smoker Vitals History BMI and BSA Data Body Mass Index Body Surface Area 35 kg/m 2 2.29 m 2 Preferred Pharmacy Pharmacy Name Phone [facility], [facility] [phone] Your Updated Medication List  
  
   
This list is accurate as of 4/25/18  3:30 PM.  Always use your most recent med list.  
  
  
  
  
 albuterol 90 mcg/actuation inhaler Commonly known as:  PROVENTIL HFA, VENTOLIN HFA, PROAIR HFA Take 2 Puffs by inhalation every six (6) hours as needed for Wheezing. ANORO ELLIPTA 62.5-25 mcg/actuation inhaler Generic drug:  umeclidinium-vilanterol Take 1 Puff by inhalation daily. cholecalciferol (vitamin D3) 2,000 unit Tab Take 1 Tab by mouth daily. furosemide 20 mg tablet Commonly known as:  LASIX Take 1 Tab by mouth daily. isosorbide mononitrate ER 60 mg CR tablet Commonly known as:  IMDUR  
TAKE 1 TABLET BY MOUTH  EVERY MORNING  
  
 levothyroxine 150 mcg tablet Commonly known as:  SYNTHROID Take 150 mcg by mouth daily. metFORMIN  mg tablet Commonly known as:  GLUCOPHAGE XR Take 500 mg by mouth daily. metoprolol succinate 100 mg tablet Commonly known as:  TOPROL-XL Take 1.5 Tabs by mouth daily. nitroglycerin 0.4 mg SL tablet Commonly known as:  NITROSTAT  
1 Tab by SubLINGual route every five (5) minutes as needed for Chest Pain. ONE DAILY GUMMY VITES PO Take  by mouth daily. pantoprazole 40 mg tablet Commonly known as:  PROTONIX Take 40 mg by mouth daily. simvastatin 40 mg tablet Commonly known as:  ZOCOR Take 40 mg by mouth daily. Follow-up Instructions Return in about 3 months (around 7/25/2018). Please provide this summary of care documentation to your next provider. Your primary care clinician is listed as Michelle Self. If you have any questions after today's visit, please call [phone].

## 2018-04-25 NOTE — PROGRESS NOTES
1. Have you been to the ER, urgent care clinic since your last visit? Hospitalized since your last visit?no    2. Have you seen or consulted any other health care providers outside of the [address] [place] [address] since your last visit? Include any pap smears or colon screening.  no

## 2018-04-25 NOTE — PROGRESS NOTES
HPI: I saw Jessica Baez in my office today in cardiovascular evaluation regarding his chronic coronary artery disease, atrial fibrillation, and aortic valvular disease. Mr. Meng Hayes is a pleasant 80 y.o. white male who I had not seen for 15-20 years when he presented to the office on 3/21/16 with the above-captioned problems. I had originally seen him for Dr. Miki Brown for new onset atrial fibrillation many years ago. I tried to do a cardioversion at one time and that was unsuccessful and we decided to control his heart rate with beta-blockers and anticoagulate him with Coumadin.       He has been followed more recently by Dr. Skip Subramanian over the years, but began to have more shortness of breath and some chest tightness sounding anginal prompting him to come to see him on 3/21/16. Eliu Frilesvia did a nuclear myocardial perfusion study at that time which was read as low risk showing no signs of ischemia and low normal ejection fraction 52%. I increased his Toprol-XL and added Imdur to his regimen and he really did reasonably well until he came into my office on October 7, 2016 with a history of increasing shortness of breath and chest tightness with activity which is quite restricting and even though his nuclear myocardial perfusion study 6 months ago was negative his symptoms were so concerning that I decided to refer him directly for cardiac catheterization.      The cardiac catheterization was completed by Dr. Ketty Markham on October 17, 2016 with findings of only moderate two-vessel coronary artery disease with a 70% mid LAD obstruction and a 60% mid circumflex obstructions neither of which were shown to have any hemodynamic significance with fractional flow reserve testing. He did have a mean gradient of 29 mmHg across the aortic valve which was difficult to obtain adequate values for in view of the atrial fibrillation suggesting moderate aortic stenosis.  He subsequently had an echocardiogram the same day which showed a mean gradient of 21 mmHg across the valve again suggesting mild to moderate aortic stenosis. When I saw him last on March 14, 2018 he related that his chest tightness with exertion have been getting more severe over the past couple of months. His general internist Dr. Chino Dixon had taken him off of Lopressor and given him some Pro-Air to try for his shortness of breath, but when I saw him his heart rate was 100 to 120 in atrial fibrillation and I started him on Toprol- mg daily and I also went ahead and did a pharmacologic myocardial perfusion study which was completed on March 16, 2018 and was read as an intermediate risk pharmacologic cardiac nuclear stress test primarily due to mildly depressed left ventricular systolic function with ejection fraction 45% in the setting of baseline atrial fibrillation with a controlled ventricular response, but there was no signs of focal ischemia or previous infarct on perfusion imaging. He comes in today and relates she is doing much better. He did have  significant improvement in his symptoms with just reinitiation of beta-blocker therapy in the form of Toprol, but due to a mildly elevated NT pro-BNP of 994 on March 22, 2018 I started him on Lasix 20 mg daily on April 20, 2018 and since that time he has lost some weight actually down some 7 pounds on our scales and he feels that his shortness of breath is much improved suggesting a component of failure indeed was the reason for his shortness of breath issues. Encounter Diagnoses   Name Primary?  SOB (shortness of breath) on exertion. , improved     Acute on chronic diastolic congestive heart failure (HCC)     Persistent atrial fibrillation ([address]     Coronary artery disease involving native coronary artery of native heart without angina pectoris Yes    Essential hypertension     Type 2 diabetes with nephropathy (HCC)     Hyperlipidemia, unspecified hyperlipidemia type        Discussion:  This patient appears to be doing much better with the addition of Toprol 100 mg daily to his regimen and more importantly the addition of Lasix 20 mg daily to his regimen just recently. His weight is down some 7 pounds since his visit here about a month ago and clinically he is much improved with a heart rate in the 90's today on my exam with clear lungs. I have requested that he begin to weigh himself daily using tomorrow morning's weight as his baseline weight and if he gains or loses 3 pounds above or below that weight he should give our office a call and we will adjust his diuretics moving forward. He tells me he had some blood work completed by Dr. Shahram Ng recently and I am going to review of that but I am also going to do a BMP, BNP, and magnesium on him in 2 or 3 weeks to be sure that we are not over diuresing him and that his electrolytes are remaining within the normal range. Since he is doing much better I will simply plan to see him again in 3 months. PCP: Ryan Coronado MD      Past Medical History:   Diagnosis Date    Atrial fibrillation ([address] 1998    CAD (coronary artery disease)     Cardiac nuclear imaging test, normal 04/01/2016    Low risk. No ischemia or prior infarction. EF 52%. Neg EKG on pharm stress test.    Diabetes ([address]     DM (diabetes mellitus) ([address]     GERD (gastroesophageal reflux disease)     Hematuria 2001    HLD (hyperlipidemia)     HTN (hypertension)     HTN (hypertension)     Hypothyroid     Pancreatitis        Past Surgical History:   Procedure Laterality Date    CARDIAC CATHETERIZATION  10/17/2016         CORONARY ARTERY ANGIOGRAM  10/17/2016         FRACTIONAL FLOW RESERVE  10/17/2016         FRACTIONAL FLOW RESERVE ADDL  10/17/2016         HX CYST REMOVAL      x4    LV ANGIOGRAPHY  10/17/2016            Current Outpatient Rx   Name  Route  Sig  Dispense  Refill    isosorbide mononitrate ER (IMDUR) 60 mg CR tablet    Oral    Take 1 Tab by mouth every morning.     90 Tab    2  aspirin delayed-release 81 mg tablet    Oral    Take 81 mg by mouth daily.  aclidinium bromide (TUDORZA PRESSAIR) 400 mcg/actuation inhaler    Inhalation    Take 1 Puff by inhalation daily.  simvastatin (ZOCOR) 40 mg tablet    Oral    Take 40 mg by mouth nightly.  warfarin (COUMADIN) 5 mg tablet    Oral    Take 5 mg by mouth. As directed. Followed by PCP              metFORMIN ER (GLUCOPHAGE XR) 500 mg tablet    Oral    Take 500 mg by mouth daily (with dinner).  levothyroxine (SYNTHROID) 100 mcg tablet    Oral    Take 100 mcg by mouth Daily (before breakfast).  HYDROcodone-acetaminophen (XODOL) 5-300 mg tablet    Oral    Take 1 Tab by mouth every six (6) hours as needed.  nitroglycerin (NITROSTAT) 0.4 mg SL tablet    SubLINGual    0.4 mg by SubLINGual route every five (5) minutes as needed for Chest Pain.  umeclidinium-vilanterol 62.5-25 mcg/actuation dsdv    Inhalation    Take 1 Puff by inhalation daily.  cholecalciferol, vitamin D3, 2,000 unit tab    Oral    Take 1 Tab by mouth daily.  metoprolol succinate (TOPROL-XL) 100 mg XL tablet    Oral    Take 1.5 Tabs by mouth daily. 60 Tab    3         No Known Allergies    Social History :  Social History   Substance Use Topics    Smoking status: Current Every Day Smoker     Packs/day: 0.50     Years: 52.00    Smokeless tobacco: Never Used    Alcohol use Yes      Comment: every other day        Family History: family history includes Diabetes in his father and mother; Stroke in his father. Review of Systems:    Constitutional: Negative. Respiratory: Negative. Cardiovascular: Negative. Gastrointestinal: Negative for abdominal pain, blood in stool, constipation, heartburn, melena, nausea and vomiting. Musculoskeletal: Negative for falls, joint pain and myalgias. Neurological: Negative for dizziness.      Physical Exam:    The patient is a cooperative, alert, well developed, well nourished 72 y.o. white  male with full white beard who is in no acute distress at the time of the examination. Visit Vitals    /62    Pulse 86    Ht 5' 9\" (1.753 m)    Wt 107.5 kg (237 lb)    SpO2 97%    BMI 35 kg/m2       HEENT: Conjuctiva white, mucosa moist, no pallor or cyanosis. Xanthelasmas on the medial aspect of both eyelids. NECK: Supple without masses, tenderness or thyromegaly. There was no jugular venous distention. Carotid are full bilaterally without bruits. CHEST: Symmetrical with good excursion. LUNGS: Clear to auscultation in all fields. HEART: The apex is not displaced. There were no lifts, thrills or heaves. There is a normal S1 and S2. There is a grade 1-2/6 mid peaking systolic ejection murmur along left sternal border with radiation to the base without appreciable diastolic murmurs, rubs, clicks, or gallops auscultated. ABDOMEN: Soft without masses, tenderness or organomegaly. EXTREMITIES: Full peripheral pulses without peripheral edema. INTEGUMENT: Warm and dry   NEUROLOGICAL: The patient is oriented x 3 with motor function grossly intact. Review of Data: See PMH and Cardiology and Imaging sections for cardiac testing      Results for orders placed or performed in visit on 03/14/18   AMB POC EKG ROUTINE W/ 12 LEADS, INTER & REP     Status: None    Narrative    Atrial fibrillation with IVCD and overall heart rate of 100 to 110. Rhythm strip on 3/14/2018 demonstrated atrial fibrillation with underlying bundle branch block of indeterminate variety as well as left anterior fascicular block with an overall heart rate of approximately 100-120. Rajan Robbins D.O., F.A.C.C. [facility]  [facility]  [address] [place] [address].   [address] [place] [phone]    PLEASE NOTE:  This document has been produced using voice recognition software. Unrecognized errors in transcription may be present.

## 2018-04-25 NOTE — PROGRESS NOTES
Review of Systems   Constitutional: Negative. Respiratory: Negative. Cardiovascular: Negative. Gastrointestinal: Negative for abdominal pain, blood in stool, constipation, heartburn, melena, nausea and vomiting. Musculoskeletal: Negative for falls, joint pain and myalgias. Neurological: Negative for dizziness.

## 2018-05-16 ENCOUNTER — HOSPITAL ENCOUNTER (OUTPATIENT)
Dept: LAB | Age: 73
Discharge: HOME | End: 2018-05-16
Payer: MEDICARE

## 2018-05-16 DIAGNOSIS — I10 ESSENTIAL HYPERTENSION: ICD-10-CM

## 2018-05-16 DIAGNOSIS — I48.19 PERSISTENT ATRIAL FIBRILLATION (HCC): ICD-10-CM

## 2018-05-16 DIAGNOSIS — I25.10 CORONARY ARTERY DISEASE INVOLVING NATIVE CORONARY ARTERY OF NATIVE HEART WITHOUT ANGINA PECTORIS: ICD-10-CM

## 2018-05-16 LAB
ANION GAP SERPL CALC-SCNC: 8 MMOL/L (ref 3–18)
BNP SERPL-MCNC: 684 PG/ML (ref 0–900)
BUN SERPL-MCNC: 24 MG/DL (ref 7–18)
BUN/CREAT SERPL: 17 (ref 12–20)
CALCIUM SERPL-MCNC: 9.1 MG/DL (ref 8.5–10.1)
CHLORIDE SERPL-SCNC: 102 MMOL/L (ref 100–108)
CO2 SERPL-SCNC: 28 MMOL/L (ref 21–32)
CREAT SERPL-MCNC: 1.44 MG/DL (ref 0.6–1.3)
GLUCOSE SERPL-MCNC: 240 MG/DL (ref 74–99)
MAGNESIUM SERPL-MCNC: 2.2 MG/DL (ref 1.6–2.6)
POTASSIUM SERPL-SCNC: 4.7 MMOL/L (ref 3.5–5.5)
SODIUM SERPL-SCNC: 138 MMOL/L (ref 136–145)

## 2018-05-16 PROCEDURE — 80048 BASIC METABOLIC PNL TOTAL CA: CPT | Performed by: INTERNAL MEDICINE

## 2018-05-16 PROCEDURE — 83735 ASSAY OF MAGNESIUM: CPT | Performed by: INTERNAL MEDICINE

## 2018-05-16 PROCEDURE — 36415 COLL VENOUS BLD VENIPUNCTURE: CPT | Performed by: INTERNAL MEDICINE

## 2018-05-16 PROCEDURE — 83880 ASSAY OF NATRIURETIC PEPTIDE: CPT | Performed by: INTERNAL MEDICINE

## 2018-05-17 NOTE — PROGRESS NOTES
This gentleman is BNP is fine. His BMP suggests a little prerenal azotemia and slight elevation of his creatinine and his magnesium is fine. As far as I can see he is just on Lasix 20 mg daily which I probably leave him on for now. If he is on more than that we can cut it back since there are no signs of heart failure.  ES

## 2018-05-23 ENCOUNTER — TELEPHONE (OUTPATIENT)
Dept: CARDIOLOGY CLINIC | Age: 73
End: 2018-05-23

## 2018-05-23 DIAGNOSIS — R06.02 SOB (SHORTNESS OF BREATH): Primary | ICD-10-CM

## 2018-05-23 NOTE — TELEPHONE ENCOUNTER
Patient called the office today and states that he has still been having shortness of breath issues. He states that he carries a 5lb bag of potatoes across the room and he is out of breath. Patient is currently taking 100mg of Toprol once daily and furosemide 20mg daily. Patient states that he does not have any swelling at this time. Will forward to Dr Nicholas Moran for review and further instructions.

## 2018-05-26 NOTE — TELEPHONE ENCOUNTER
I called and placed a message on the patient's answering machine on his home telephone to call the office on Tuesday morning to let me know how he was doing in terms of shortness of breath. I will talk to him at that time or possibly Tuesday evening after office hours and see how he is doing. We did a recent BNP on him which was normal so it does not appear as if he is got heart failure is the reason for shortness of breath and his recent nuclear myocardial perfusion study did not show any ischemia but the overall LV function was little bit depressed. His aortic stenosis appeared only moderate on echo in December so the reason for his shortness of breath is really not clear. He may need to have pulmonary function testing and ultimately a cardiac catheterization for completeness.  ES

## 2018-05-31 ENCOUNTER — TELEPHONE (OUTPATIENT)
Dept: CARDIOLOGY CLINIC | Age: 73
End: 2018-05-31

## 2018-05-31 NOTE — TELEPHONE ENCOUNTER
----- Message from Nicole Colorado DO sent at 5/17/2018  4:27 PM EDT -----  This gentleman is BNP is fine. His BMP suggests a little prerenal azotemia and slight elevation of his creatinine and his magnesium is fine. As far as I can see he is just on Lasix 20 mg daily which I probably leave him on for now. If he is on more than that we can cut it back since there are no signs of heart failure.  ES

## 2018-05-31 NOTE — TELEPHONE ENCOUNTER
I called and discussed the patient shortness of breath issues with him. His nuclear myocardial perfusion study demonstrated no ischemia with some mild decrease in overall LV function with ejection fraction of 45% in March 2018. He does have some pulmonary issues but his last attempted PFTs were nondiagnostic. His echocardiogram from December 2017 suggested that his aortic stenosis was only mild to moderate and his LV function was normal.    I believe that the first thing we need to do is to repeat his echocardiogram to see if his left ventricular function is still normal and to reassess his aortic stenosis. If his left ventricular function appears to be normal and his aortic stenosis still mild then I would refer him to pulmonary and if they cannot find any problem to explain his shortness of breath then he would need a cardiac catheterization and selective coronary angiography. Please call him tomorrow and get him scheduled for an echocardiogram sometime in the next week or 2.  ES

## 2018-06-06 ENCOUNTER — HOSPITAL ENCOUNTER (OUTPATIENT)
Dept: NON INVASIVE DIAGNOSTICS | Age: 73
Discharge: HOME | End: 2018-06-06
Attending: INTERNAL MEDICINE
Payer: MEDICARE

## 2018-06-06 DIAGNOSIS — R06.02 SOB (SHORTNESS OF BREATH): ICD-10-CM

## 2018-06-06 PROCEDURE — 74011250636 HC RX REV CODE- 250/636: Performed by: INTERNAL MEDICINE

## 2018-06-06 PROCEDURE — C8929 TTE W OR WO FOL WCON,DOPPLER: HCPCS

## 2018-06-06 RX ADMIN — PERFLUTREN 2 ML: 6.52 INJECTION, SUSPENSION INTRAVENOUS at 15:55

## 2018-06-07 NOTE — PROGRESS NOTES
Per the telephone encounter, \"I believe that the first thing we need to do is to repeat his echocardiogram to see if his left ventricular function is still normal and to reassess his aortic stenosis. If his left ventricular function appears to be normal and his aortic stenosis still mild then I would refer him to pulmonary and if they cannot find any problem to explain his shortness of breath then he would need a cardiac catheterization and selective coronary angiography. \"

## 2018-06-20 ENCOUNTER — TELEPHONE (OUTPATIENT)
Dept: CARDIOLOGY CLINIC | Age: 73
End: 2018-06-20

## 2018-06-20 DIAGNOSIS — R06.02 SOB (SHORTNESS OF BREATH): Primary | ICD-10-CM

## 2018-06-20 NOTE — TELEPHONE ENCOUNTER
----- Message from Joao Hawthorne LPN sent at 3/0/3983 10:32 AM EDT -----  Per the telephone encounter, \"I believe that the first thing we need to do is to repeat his echocardiogram to see if his left ventricular function is still normal and to reassess his aortic stenosis. If his left ventricular function appears to be normal and his aortic stenosis still mild then I would refer him to pulmonary and if they cannot find any problem to explain his shortness of breath then he would need a cardiac catheterization and selective coronary angiography. \"

## 2018-06-20 NOTE — TELEPHONE ENCOUNTER
I called and placed a message on the patient's answering machine at home for him to call the office and ask for Gali for the results of his echocardiogram.  His echocardiogram demonstrated completely normal left ventricular systolic function with mild to moderate aortic stenosis and he is already had a BNP checked which was normal.  Consequently it does not appear that his heart is related to her shortness of breath issues unless it is related to blockages. I would like to have him see a pulmonologist for evaluation of shortness of breath first and if he does not appear to have significant pulmonary issues then we will have to discuss other options such as a cardiac catheterization make sure that he has no blockage issues causing his use of breath issues. Please let him know.  ES

## 2018-06-25 NOTE — TELEPHONE ENCOUNTER
Spoke with patient to give results. Per Dr Christopher Douglas, refer to pulmonary for shortness of breath issues.       Verbal order and read back per Luz Cha, DO

## 2018-06-25 NOTE — TELEPHONE ENCOUNTER
Got appt scheduled for 7/18/18 at 11:30AM.  Patient called and made aware of appt date, time, and address.       Verbal order and read back per Katelynn Starr DO

## 2018-07-25 ENCOUNTER — OFFICE VISIT (OUTPATIENT)
Dept: CARDIOLOGY CLINIC | Age: 73
End: 2018-07-25

## 2018-07-25 VITALS
HEART RATE: 100 BPM | BODY MASS INDEX: 35.84 KG/M2 | DIASTOLIC BLOOD PRESSURE: 62 MMHG | HEIGHT: 69 IN | SYSTOLIC BLOOD PRESSURE: 110 MMHG | WEIGHT: 242 LBS | OXYGEN SATURATION: 97 %

## 2018-07-25 DIAGNOSIS — I25.110 CORONARY ARTERY DISEASE INVOLVING NATIVE CORONARY ARTERY WITH UNSTABLE ANGINA PECTORIS, UNSPECIFIED WHETHER NATIVE OR TRANSPLANTED HEART (HCC): Primary | ICD-10-CM

## 2018-07-25 DIAGNOSIS — E78.5 HYPERLIPIDEMIA, UNSPECIFIED HYPERLIPIDEMIA TYPE: ICD-10-CM

## 2018-07-25 DIAGNOSIS — I35.0 AORTIC VALVE STENOSIS, ETIOLOGY OF CARDIAC VALVE DISEASE UNSPECIFIED: ICD-10-CM

## 2018-07-25 DIAGNOSIS — I48.19 PERSISTENT ATRIAL FIBRILLATION (HCC): ICD-10-CM

## 2018-07-25 DIAGNOSIS — I10 ESSENTIAL HYPERTENSION: ICD-10-CM

## 2018-07-25 RX ORDER — WARFARIN SODIUM 5 MG/1
TABLET ORAL
COMMUNITY

## 2018-07-25 RX ORDER — ASPIRIN 81 MG/1
81 TABLET ORAL DAILY
COMMUNITY
End: 2022-10-18

## 2018-07-25 NOTE — MR AVS SNAPSHOT
[address] [place] [address] [place] [address] 
[phone] Patient: Doug Graham MRN: [id number] HRM:2/2/5030 Visit Information Date & Time Provider Department Dept. Phone Encounter #  
 7/25/2018  2:00 PM Tamar Nguyen, [address] [facility] [id number] Follow-up Instructions Return in about 1 month (around 8/25/2018). Upcoming Health Maintenance Date Due Hepatitis C Screening 1945 FOOT EXAM Q1 4/3/1957 EYE EXAM RETINAL OR DILATED Q1 4/3/1957 DTaP/Tdap/Td series (1 - Tdap) 4/3/1968 ZOSTER VACCINE AGE 60> 2/3/2007 MICROALBUMIN Q1 11/4/2010 GLAUCOMA SCREENING Q2Y 4/3/2012 Pneumococcal 65+ Low/Medium Risk (1 of 2 - PCV13) 4/3/2012 MEDICARE YEARLY EXAM 3/19/2018 Influenza Age 5 to Adult 8/1/2018 HEMOGLOBIN A1C Q6M 10/13/2018 LIPID PANEL Q1 4/13/2019 FOBT Q 1 YEAR AGE 50-75 4/13/2019 Allergies as of 7/25/2018  Review Complete On: 4/25/2018 By: Tamar Nguyen, DO No Known Allergies Current Immunizations  Never Reviewed No immunizations on file. Not reviewed this visit You Were Diagnosed With   
  
 Codes Comments Coronary artery disease involving native coronary artery of native heart without angina pectoris    -  Primary ICD-10-CM: I25.10 ICD-9-CM: 414.01 Persistent atrial fibrillation (HCC)     ICD-10-CM: I48.1 ICD-9-CM: 427.31 Essential hypertension     ICD-10-CM: I10 
ICD-9-CM: 401.9 Vitals BP Pulse Height(growth percentile) Weight(growth percentile) SpO2 BMI  
 110/62 100 5' 9\" (1.753 m) 242 lb (109.8 kg) 97% 35.74 kg/m2 Smoking Status Current Every Day Smoker Vitals History BMI and BSA Data Body Mass Index Body Surface Area 35.74 kg/m 2 2.31 m 2 Preferred Pharmacy Pharmacy Name Phone [address] [facility], [address] [place] [address] [address] Audrey Mcclelland Your Updated Medication List  
  
   
This list is accurate as of 7/25/18  3:07 PM.  Always use your most recent med list.  
  
  
  
  
 albuterol 90 mcg/actuation inhaler Commonly known as:  PROVENTIL HFA, VENTOLIN HFA, PROAIR HFA Take 2 Puffs by inhalation every six (6) hours as needed for Wheezing. ANORO ELLIPTA 62.5-25 mcg/actuation inhaler Generic drug:  umeclidinium-vilanterol Take 1 Puff by inhalation daily. aspirin delayed-release 81 mg tablet 1 tablet  
  
 cholecalciferol (vitamin D3) 2,000 unit Tab Take 1 Tab by mouth daily. COUMADIN 5 mg tablet Generic drug:  warfarin Take 1 tablet by mouth once a day  
  
 furosemide 20 mg tablet Commonly known as:  LASIX Take 1 Tab by mouth daily. isosorbide mononitrate ER 60 mg CR tablet Commonly known as:  IMDUR  
TAKE 1 TABLET BY MOUTH  EVERY MORNING  
  
 levothyroxine 150 mcg tablet Commonly known as:  SYNTHROID Take 150 mcg by mouth daily. metFORMIN  mg tablet Commonly known as:  GLUCOPHAGE XR Take 500 mg by mouth daily. metoprolol succinate 100 mg tablet Commonly known as:  TOPROL-XL Take 1.5 Tabs by mouth daily. nitroglycerin 0.4 mg SL tablet Commonly known as:  NITROSTAT  
1 Tab by SubLINGual route every five (5) minutes as needed for Chest Pain. ONE DAILY GUMMY VITES PO Take  by mouth daily. pantoprazole 40 mg tablet Commonly known as:  PROTONIX Take 40 mg by mouth daily. simvastatin 40 mg tablet Commonly known as:  ZOCOR Take 40 mg by mouth daily. We Performed the Following AMB POC EKG ROUTINE W/ 12 LEADS, INTER & REP [07332 CPT(R)] Follow-up Instructions Return in about 1 month (around 8/25/2018). To-Do List   
 07/25/2018 Lab:  CBC WITH AUTOMATED DIFF   
  
 07/25/2018   Lab:  METABOLIC PANEL, COMPREHENSIVE   
  
 07/25/2018 Lab:  PROTHROMBIN TIME + INR   
  
 07/25/2018 Imaging:  XR CHEST PA LAT   
  
 08/01/2018 Lab:  METABOLIC PANEL, BASIC Patient Instructions Instructions Patients Name:  Maxine Tsai 1. You are scheduled to have a left heart cath on 7/30/18  at 9:15AM Please check in at 8:15AM  . 2. Please go to [facility] and park in the outpatient parking lot that is located around to the back of the hospital and enter through the [facility] building. Once you enter through the [facility] check in with the  there. The  will either give you directions or assist you in getting to the cath holding area. 3. You are not to eat or drink anything after midnight the night before your procedure. Small sips of water to take your medications is ok. 4. If you are diabetic, do not take your insulin/sugar pill the morning of the procedure. 5. MEDICATION INSTRUCTIONS:   Please take your morning medications with the following special instructions: 
 
[x]          Please make sure to take your Blood pressure medication : Isosorbide and Metoprolol. [x]          Take your Aspirin. [x]          Stop your Coumadin on 7/25/18  and do not resume it until after the procedure. Hold your Lasix the morning of your procedure. Hold your Metformin starting on 7/28/18 and do not restart it until after you have labs completed on 8/1/18 AND you hear from our office. 6. We encourage families to wait in the waiting room on the first floor while the procedure is being done. The Doctor will come out and talk with you as soon as the procedure is over. 7. There is the possibility that you may spend the night in the hospital, depending on the results of the procedure. This will be determined after the procedure is done. If angioplasty or stent is planned, you will stay at least one day. 8. If you or your family have any questions, please call our office Monday Friday, 9:00 a. m.4:30 p.m.,  At [phone], and ask to speak to one of the nurses. Please provide this summary of care documentation to your next provider. Your primary care clinician is listed as [facility]. If you have any questions after today's visit, please call [phone].

## 2018-07-25 NOTE — PROGRESS NOTES
Review of Systems   Constitutional: Negative for chills, fever, malaise/fatigue and weight loss. Respiratory: Positive for shortness of breath. Negative for cough, hemoptysis and wheezing. Cardiovascular: Negative for chest pain, palpitations, orthopnea and leg swelling. Gastrointestinal: Positive for heartburn. Negative for abdominal pain, blood in stool, constipation, diarrhea, melena, nausea and vomiting. Musculoskeletal: Positive for joint pain. Negative for falls and myalgias. Neurological: Negative for dizziness.

## 2018-07-25 NOTE — PATIENT INSTRUCTIONS
Instructions    Patients Name:  Maxine Tsai    1. You are scheduled to have a left heart cath on 7/30/18  at 9:15AM Please check in at 8:15AM  .     2. Please go to [facility] and park in the outpatient parking lot that is located around to the back of the hospital and enter through the [facility] building. Once you enter through the [facility] check in with the  there. The  will either give you directions or assist you in getting to the cath holding area. 3. You are not to eat or drink anything after midnight the night before your procedure. Small sips of water to take your medications is ok. 4. If you are diabetic, do not take your insulin/sugar pill the morning of the procedure. 5. MEDICATION INSTRUCTIONS:   Please take your morning medications with the following special instructions:    [x]          Please make sure to take your Blood pressure medication : Isosorbide and Metoprolol. [x]          Take your Aspirin. [x]          Stop your Coumadin on 7/25/18  and do not resume it until after the procedure. Hold your Lasix the morning of your procedure. Hold your Metformin starting on 7/28/18 and do not restart it until after you have labs completed on 8/1/18 AND you hear from our office. 6. We encourage families to wait in the waiting room on the first floor while the procedure is being done. The Doctor will come out and talk with you as soon as the procedure is over. 7. There is the possibility that you may spend the night in the hospital, depending on the results of the procedure. This will be determined after the procedure is done. If angioplasty or stent is planned, you will stay at least one day. 8. If you or your family have any questions, please call our office Monday Friday, 9:00 a. m.4:30 p.m.,  At [phone], and ask to speak to one of the nurses.

## 2018-07-25 NOTE — PROGRESS NOTES
HPI: I saw Noelle Walter in my office today in cardiovascular evaluation regarding his chronic coronary artery disease, atrial fibrillation, and aortic valvular disease to discuss his persistent shortness of breath with exertion and exertional anginal type chest discomfort. Mr. Davian Castanon is a pleasant 80 y.o. white male who I had not seen for 15-20 years when he presented to the office on 3/21/16 with the above-captioned problems. I had originally seen him for Dr. Wilber Arriaza for new onset atrial fibrillation many years ago. I tried to do a cardioversion at one time and that was unsuccessful and we decided to control his heart rate with beta-blockers and anticoagulate him with Coumadin.       He has been followed more recently by Dr. Joel Graff over the years, but began to have more shortness of breath and some chest tightness sounding anginal prompting him to come to see him on 3/21/16. Shana Lamonte did a nuclear myocardial perfusion study at that time which was read as low risk showing no signs of ischemia and low normal ejection fraction 52%. I increased his Toprol-XL and added Imdur to his regimen and he really did reasonably well until he came into my office on October 7, 2016 with a history of increasing shortness of breath and chest tightness with activity which is quite restricting and even though his nuclear myocardial perfusion study 6 months ago was negative his symptoms were so concerning that I decided to refer him directly for cardiac catheterization.      [facility] with findings of only moderate two-vessel coronary artery disease with a 70% mid LAD obstruction and a 60% mid circumflex obstructions neither of which were shown to have any hemodynamic significance with fractional flow reserve testing.  He did have a mean gradient of 29 mmHg across the aortic valve which was difficult to obtain adequate values for in view of the atrial fibrillation suggesting moderate aortic stenosis. He subsequently had an echocardiogram the same day which showed a mean gradient of 21 mmHg across the valve again suggesting mild to moderate aortic stenosis.     When I saw him last on March 14, 2018 he related that his chest tightness with exertion have been getting more severe over the past couple of months. His general internist Dr. Reyes Miller had taken him off of Lopressor and given him some Pro-Air to try for his shortness of breath, but when I saw him his heart rate was 100 to 120 in atrial fibrillation and I started him on Toprol- mg daily and I also went ahead and did a pharmacologic myocardial perfusion study which was completed on March 16, 2018 and was read as an intermediate risk pharmacologic cardiac nuclear stress test primarily due to mildly depressed left ventricular systolic function with ejection fraction 45% in the setting of baseline atrial fibrillation with a controlled ventricular response, but there was no signs of focal ischemia or previous infarct on perfusion imaging. It should be noted that since his last visit he had an echocardiogram completed on June 7, 2018 which demonstrated completely normal left ventricular function with ejection fraction 55-60% in the setting of severe left atrial enlargement and signs of moderate aortic stenosis with a mean gradient of 25 mmHg across the valve. There really was not any significant change in this echo as compared to his echo in December 15, 2017. He comes in today and relates that his shortness of breath with exertion and chest tightness issues are getting more severe and over the past several weeks he had some problems with nocturnal angina for week or 2 which have actually's defervesced more recently but his chest tightness with exertion and shortness of breath is really quite restricting and greatly restricting his functional capacity.   He is not having any failure symptomatology denying orthopnea, paroxysmal nocturnal dyspnea or peripheral edema, but his discomfort in his chest with exertion relieved promptly by rest with associated shortness of breath very consistent with angina is clearly worsening. Encounter Diagnoses   Name Primary?  Coronary artery disease involving native coronary artery with unstable angina pectoris, unspecified whether native or transplanted heart (HCC) Yes    Persistent atrial fibrillation (HCC)     Aortic valve stenosis, moderate     Essential hypertension     Hyperlipidemia, unspecified hyperlipidemia type        Discussion: This gentleman is having persistent worsening symptoms which certainly may be multifactorial since he does have atrial fibrillation with a ventricular response currently of around 100 and I have told him to increase his Toprol-XL from only 50 mg a day which he has been taking to 50 mg twice a day. He also has moderate aortic stenosis and moderately severe LAD and circumflex artery obstructions both of which could be working together with some uncontrolled heart rate in atrial fibrillation could cause his anginal symptoms. However, his anginal symptoms are really getting much worse and even though his recent nuclear myocardial perfusion study did not clearly show ischemia I think we should repeat his cardiac catheterization to see if his LAD or circumflex obstruction has become significant to warrant stenting and to also assess the severity of his aortic stenosis if possible to more definitively define the severity of his aortic stenosis. I have tentatively schedule him for cardiac catheterization next week, but if he has any recurrent nocturnal angina which he had a few weeks ago even if it resolves within a few minutes I recommend he go into the hospital and be managed with therapeutic anticoagulation until we can complete his catheterization study in a more urgent fashion.     PCP: Brook Scott MD      Past Medical History:   Diagnosis Date  Atrial fibrillation ([facility] 1998    CAD (coronary artery disease)     Cardiac nuclear imaging test, normal 04/01/2016    Low risk. No ischemia or prior infarction. EF 52%. Neg EKG on pharm stress test.    Diabetes ([facility]     DM (diabetes mellitus) ([facility]     GERD (gastroesophageal reflux disease)     Hematuria 2001    HLD (hyperlipidemia)     HTN (hypertension)     HTN (hypertension)     Hypothyroid     Pancreatitis        Past Surgical History:   Procedure Laterality Date    CARDIAC CATHETERIZATION  10/17/2016         CORONARY ARTERY ANGIOGRAM  10/17/2016         FRACTIONAL FLOW RESERVE  10/17/2016         FRACTIONAL FLOW RESERVE ADDL  10/17/2016         HX CYST REMOVAL      x4    LV ANGIOGRAPHY  10/17/2016            Current Outpatient Rx   Name  Route  Sig  Dispense  Refill    isosorbide mononitrate ER (IMDUR) 60 mg CR tablet    Oral    Take 1 Tab by mouth every morning. 90 Tab    2      aspirin delayed-release 81 mg tablet    Oral    Take 81 mg by mouth daily.  aclidinium bromide (TUDORZA PRESSAIR) 400 mcg/actuation inhaler    Inhalation    Take 1 Puff by inhalation daily.  simvastatin (ZOCOR) 40 mg tablet    Oral    Take 40 mg by mouth nightly.  warfarin (COUMADIN) 5 mg tablet    Oral    Take 5 mg by mouth. As directed. Followed by PCP              metFORMIN ER (GLUCOPHAGE XR) 500 mg tablet    Oral    Take 500 mg by mouth daily (with dinner).  levothyroxine (SYNTHROID) 100 mcg tablet    Oral    Take 100 mcg by mouth Daily (before breakfast).  HYDROcodone-acetaminophen (XODOL) 5-300 mg tablet    Oral    Take 1 Tab by mouth every six (6) hours as needed.  nitroglycerin (NITROSTAT) 0.4 mg SL tablet    SubLINGual    0.4 mg by SubLINGual route every five (5) minutes as needed for Chest Pain.  umeclidinium-vilanterol 62.5-25 mcg/actuation dsdv    Inhalation    Take 1 Puff by inhalation daily.  cholecalciferol, vitamin D3, 2,000 unit tab    Oral    Take 1 Tab by mouth daily.  metoprolol succinate (TOPROL-XL) 100 mg XL tablet    Oral    Take 1.5 Tabs by mouth daily. 60 Tab    3         No Known Allergies    Social History :  Social History   Substance Use Topics    Smoking status: Current Every Day Smoker     Packs/day: 0.50     Years: 52.00    Smokeless tobacco: Never Used    Alcohol use Yes      Comment: every other day        Family History: family history includes Diabetes in his father and mother; Stroke in his father. Review of Systems:     Constitutional: Negative for chills, fever, malaise/fatigue and weight loss. Respiratory: Positive for shortness of breath. Negative for cough, hemoptysis and wheezing. Cardiovascular: Negative for chest pain, palpitations, orthopnea and leg swelling. Gastrointestinal: Positive for heartburn. Negative for abdominal pain, blood in stool, constipation, diarrhea, melena, nausea and vomiting. Musculoskeletal: Positive for joint pain. Negative for falls and myalgias. Neurological: Negative for dizziness. Physical Exam:    The patient is a cooperative, alert, well developed, well nourished 72 y.o. white  male with full white beard who is in no acute distress at the time of the examination. Visit Vitals    /62    Pulse 100    Ht 5' 9\" (1.753 m)    Wt 109.8 kg (242 lb)    SpO2 97%    BMI 35.74 kg/m2       HEENT: Conjuctiva white, mucosa moist, no pallor or cyanosis. Xanthelasmas on the medial aspect of both eyelids. NECK: Supple without masses, tenderness or thyromegaly. There was no jugular venous distention. Carotid are full bilaterally without bruits. CHEST: Symmetrical with good excursion. LUNGS: Clear to auscultation in all fields. HEART: The apex is not displaced. There were no lifts, thrills or heaves. There is a normal S1 and S2.   There is a grade 2/6 mid to late peaking systolic ejection murmur along left sternal border with radiation to the base without appreciable diastolic murmurs, rubs, clicks, or gallops auscultated. ABDOMEN: Soft without masses, tenderness or organomegaly. EXTREMITIES: Full peripheral pulses without peripheral edema. INTEGUMENT: Warm and dry   NEUROLOGICAL: The patient is oriented x 3 with motor function grossly intact. Review of Data: See PMH and Cardiology and Imaging sections for cardiac testing      Results for orders placed or performed in visit on 07/25/18   AMB POC EKG ROUTINE W/ 12 LEADS, INTER & REP     Status: None    Narrative    Atrial fibrillation with ventricular response around 100. There is a left axis deviation and a pleat interventricular conduction delay of indeterminate variety. Compared to the EKG of March 14, 2018 heart rate is actually somewhat slower. Rubin Cooper D.O., F.A.C.C. [facility]  [facility]  [address] [place] [address]. [address] [place]  [phone]    PLEASE NOTE:  This document has been produced using voice recognition software. Unrecognized errors in transcription may be present.

## 2018-07-25 NOTE — LETTER
2018 2:55 PM 
 
 
 
Liliam Souza 
[id number] 
1945 Insurance:  Medicare/[employer]                  Auth # No Auth Needed Proc Date:                 Proc Time:  9:15am 
 
Performing MD : Dr. Roslyn Ruiz                      Procedure:Left [facility] Hospital:  [facility]                                            PCP Dr. Vincent Ellison Scheduled with:  Jenifer/Email                                                        Date:2018 HP:      EK/25  Labs:______  CXR: _______  Orders:  Special Instructions:  _____________________________________________________ 
______________________________________________________________________ 
______________________________________________________________________ Date Faxed:   ______________   Pages Faxed: ___________________ The materials enclosed with this facsimile transmission are private and confidential and are the property of the sender. If you are not the intended recipient, be advised that any unauthorized use, disclosure, copying, distribution, or the taking of any action in reliance on the contents of this telecopied information is strictly prohibited. If you have received this in error, please immediately notify the sender via telephone to arrange for return of the forwarded documentation.

## 2018-07-25 NOTE — PROGRESS NOTES
1. Have you been to the ER, urgent care clinic since your last visit? Hospitalized since your last visit?no    2. Have you seen or consulted any other health care providers outside of the [address] [place] [address] since your last visit? Include any pap smears or colon screening.  no

## 2018-07-26 ENCOUNTER — HOSPITAL ENCOUNTER (OUTPATIENT)
Dept: PREADMISSION TESTING | Age: 73
Discharge: HOME | End: 2018-07-26
Payer: MEDICARE

## 2018-07-26 ENCOUNTER — TELEPHONE (OUTPATIENT)
Dept: CARDIOLOGY CLINIC | Age: 73
End: 2018-07-26

## 2018-07-26 ENCOUNTER — HOSPITAL ENCOUNTER (OUTPATIENT)
Dept: GENERAL RADIOLOGY | Age: 73
Discharge: HOME | End: 2018-07-26
Payer: MEDICARE

## 2018-07-26 DIAGNOSIS — I48.19 PERSISTENT ATRIAL FIBRILLATION (HCC): ICD-10-CM

## 2018-07-26 DIAGNOSIS — I10 ESSENTIAL HYPERTENSION: ICD-10-CM

## 2018-07-26 DIAGNOSIS — I25.10 CORONARY ARTERY DISEASE INVOLVING NATIVE CORONARY ARTERY OF NATIVE HEART WITHOUT ANGINA PECTORIS: ICD-10-CM

## 2018-07-26 LAB
ALBUMIN SERPL-MCNC: 3.8 G/DL (ref 3.4–5)
ALBUMIN/GLOB SERPL: 1.1 {RATIO} (ref 0.8–1.7)
ALP SERPL-CCNC: 89 U/L (ref 45–117)
ALT SERPL-CCNC: 53 U/L (ref 16–61)
ANION GAP SERPL CALC-SCNC: 8 MMOL/L (ref 3–18)
AST SERPL-CCNC: 32 U/L (ref 15–37)
BASOPHILS # BLD: 0.1 K/UL (ref 0–0.1)
BASOPHILS NFR BLD: 1 % (ref 0–2)
BILIRUB SERPL-MCNC: 0.4 MG/DL (ref 0.2–1)
BUN SERPL-MCNC: 17 MG/DL (ref 7–18)
BUN/CREAT SERPL: 13 (ref 12–20)
CALCIUM SERPL-MCNC: 9.1 MG/DL (ref 8.5–10.1)
CHLORIDE SERPL-SCNC: 104 MMOL/L (ref 100–108)
CO2 SERPL-SCNC: 28 MMOL/L (ref 21–32)
CREAT SERPL-MCNC: 1.35 MG/DL (ref 0.6–1.3)
DIFFERENTIAL METHOD BLD: ABNORMAL
EOSINOPHIL # BLD: 0.3 K/UL (ref 0–0.4)
EOSINOPHIL NFR BLD: 4 % (ref 0–5)
ERYTHROCYTE [DISTWIDTH] IN BLOOD BY AUTOMATED COUNT: 13.7 % (ref 11.6–14.5)
GLOBULIN SER CALC-MCNC: 3.5 G/DL (ref 2–4)
GLUCOSE SERPL-MCNC: 317 MG/DL (ref 74–99)
HCT VFR BLD AUTO: 42.8 % (ref 36–48)
HGB BLD-MCNC: 14.3 G/DL (ref 13–16)
INR PPP: 1.9 (ref 0.8–1.2)
LYMPHOCYTES # BLD: 2 K/UL (ref 0.9–3.6)
LYMPHOCYTES NFR BLD: 33 % (ref 21–52)
MCH RBC QN AUTO: 30.4 PG (ref 24–34)
MCHC RBC AUTO-ENTMCNC: 33.4 G/DL (ref 31–37)
MCV RBC AUTO: 91.1 FL (ref 74–97)
MONOCYTES # BLD: 0.7 K/UL (ref 0.05–1.2)
MONOCYTES NFR BLD: 11 % (ref 3–10)
NEUTS SEG # BLD: 3.1 K/UL (ref 1.8–8)
NEUTS SEG NFR BLD: 51 % (ref 40–73)
PLATELET # BLD AUTO: 201 K/UL (ref 135–420)
PMV BLD AUTO: 11.3 FL (ref 9.2–11.8)
POTASSIUM SERPL-SCNC: 4.9 MMOL/L (ref 3.5–5.5)
PROT SERPL-MCNC: 7.3 G/DL (ref 6.4–8.2)
PROTHROMBIN TIME: 21.4 SEC (ref 11.5–15.2)
RBC # BLD AUTO: 4.7 M/UL (ref 4.7–5.5)
SODIUM SERPL-SCNC: 140 MMOL/L (ref 136–145)
WBC # BLD AUTO: 6.1 K/UL (ref 4.6–13.2)

## 2018-07-26 PROCEDURE — 71046 X-RAY EXAM CHEST 2 VIEWS: CPT

## 2018-07-26 PROCEDURE — 80053 COMPREHEN METABOLIC PANEL: CPT | Performed by: INTERNAL MEDICINE

## 2018-07-26 PROCEDURE — 85610 PROTHROMBIN TIME: CPT | Performed by: INTERNAL MEDICINE

## 2018-07-26 PROCEDURE — 85025 COMPLETE CBC W/AUTO DIFF WBC: CPT | Performed by: INTERNAL MEDICINE

## 2018-07-26 PROCEDURE — 36415 COLL VENOUS BLD VENIPUNCTURE: CPT | Performed by: INTERNAL MEDICINE

## 2018-07-26 NOTE — TELEPHONE ENCOUNTER
Called patient to inform him that his procedure time for Left Cath on 7/30/18 has changed to 10:30am with him checking in at 9:30am. He has two questions . Candance Huger 1.  Does Dr. Kevin Perez still want him taking 2 Metoprolol daily? 2. Is he allowed to drink water after midnight the night before? I told him that I would have a nurse call him.

## 2018-07-27 ENCOUNTER — NURSE NAVIGATOR (OUTPATIENT)
Dept: OTHER | Age: 73
End: 2018-07-27

## 2018-07-27 NOTE — NURSE NAVIGATOR
Referring Provider: Casandra Laguerre MD 
 
 
Lung Cancer Risk Profile:  
Age: 72 Gender: Male Height: 69\" Weight: 242# Smoking History: 
Smoking Status: current use # years smokin # years quit: 0 Packs/day: 1 Pack years: 54 Patient discussed smoking cessation with PCP: Yes, per patient report Patient participated in shared decision making process with PCP: Unknown Patient is currently experiencing symptoms: No, per patient report If yes what symptoms:  
 
Co-Morbidities:  COPD, CAD Cancer History:   
 
Additional Risk Factors:      Exposure to second hand smoke Patient's smoking history discussed via phone. Patient meets LDCT lung cancer screening criteria. Call transferred to central scheduling to schedule exam. 
 
 
NAJMA LedesmaN, RN, OCN [facility] Nurse Navigator

## 2018-07-27 NOTE — TELEPHONE ENCOUNTER
I spoke with patient wife. Per patient wife, she states that patient's metoprolol was 100mg tablets, but she is unsure if he is taking 100mg daily or 150mg daily. I consulted with Dr Reji Guy. Per Dr Reji Guy, patient can take 100mg metoprolol in the morning and 50mg metoprolol in the evening if he is only taking 100mg daily. If patient is taking 150mg daily already, patient can take 100mg in the morning and 100mg in the evening. I explained all of this to the wife, but wife states that she will have patient call back to confirm actual dose and he may need clarification.  
 
 
Verbal order and read back per Judie Nguyen DO

## 2018-07-30 ENCOUNTER — HOSPITAL ENCOUNTER (OUTPATIENT)
Age: 73
Setting detail: OUTPATIENT SURGERY
Discharge: HOME | End: 2018-07-30
Attending: INTERNAL MEDICINE | Admitting: INTERNAL MEDICINE
Payer: MEDICARE

## 2018-07-30 VITALS
RESPIRATION RATE: 17 BRPM | BODY MASS INDEX: 35.84 KG/M2 | WEIGHT: 242 LBS | SYSTOLIC BLOOD PRESSURE: 101 MMHG | HEART RATE: 70 BPM | HEIGHT: 69 IN | OXYGEN SATURATION: 98 % | DIASTOLIC BLOOD PRESSURE: 73 MMHG

## 2018-07-30 DIAGNOSIS — I20.0: ICD-10-CM

## 2018-07-30 LAB
GLUCOSE BLD STRIP.AUTO-MCNC: 303 MG/DL (ref 70–110)
INR BLD: 1 (ref 0.9–1.2)

## 2018-07-30 PROCEDURE — C1894 INTRO/SHEATH, NON-LASER: HCPCS | Performed by: INTERNAL MEDICINE

## 2018-07-30 PROCEDURE — C1769 GUIDE WIRE: HCPCS | Performed by: INTERNAL MEDICINE

## 2018-07-30 PROCEDURE — 85610 PROTHROMBIN TIME: CPT

## 2018-07-30 PROCEDURE — 93458 L HRT ARTERY/VENTRICLE ANGIO: CPT | Performed by: INTERNAL MEDICINE

## 2018-07-30 PROCEDURE — 77030015766: Performed by: INTERNAL MEDICINE

## 2018-07-30 PROCEDURE — 99152 MOD SED SAME PHYS/QHP 5/>YRS: CPT | Performed by: INTERNAL MEDICINE

## 2018-07-30 PROCEDURE — 74011636320 HC RX REV CODE- 636/320: Performed by: INTERNAL MEDICINE

## 2018-07-30 PROCEDURE — 77030013744: Performed by: INTERNAL MEDICINE

## 2018-07-30 PROCEDURE — 74011000250 HC RX REV CODE- 250: Performed by: INTERNAL MEDICINE

## 2018-07-30 PROCEDURE — 82962 GLUCOSE BLOOD TEST: CPT

## 2018-07-30 PROCEDURE — 77030013797 HC KT TRNSDUC PRSSR EDWD -A: Performed by: INTERNAL MEDICINE

## 2018-07-30 PROCEDURE — 74011250636 HC RX REV CODE- 250/636

## 2018-07-30 PROCEDURE — 74011250636 HC RX REV CODE- 250/636: Performed by: INTERNAL MEDICINE

## 2018-07-30 RX ORDER — SODIUM CHLORIDE 0.9 % (FLUSH) 0.9 %
5-10 SYRINGE (ML) INJECTION AS NEEDED
Status: DISCONTINUED | OUTPATIENT
Start: 2018-07-30 | End: 2018-07-30 | Stop reason: HOSPADM

## 2018-07-30 RX ORDER — FENTANYL CITRATE 50 UG/ML
INJECTION, SOLUTION INTRAMUSCULAR; INTRAVENOUS AS NEEDED
Status: DISCONTINUED | OUTPATIENT
Start: 2018-07-30 | End: 2018-07-30 | Stop reason: HOSPADM

## 2018-07-30 RX ORDER — LIDOCAINE HYDROCHLORIDE 10 MG/ML
INJECTION, SOLUTION EPIDURAL; INFILTRATION; INTRACAUDAL; PERINEURAL AS NEEDED
Status: DISCONTINUED | OUTPATIENT
Start: 2018-07-30 | End: 2018-07-30 | Stop reason: HOSPADM

## 2018-07-30 RX ORDER — MIDAZOLAM HYDROCHLORIDE 1 MG/ML
INJECTION, SOLUTION INTRAMUSCULAR; INTRAVENOUS AS NEEDED
Status: DISCONTINUED | OUTPATIENT
Start: 2018-07-30 | End: 2018-07-30 | Stop reason: HOSPADM

## 2018-07-30 RX ORDER — VERAPAMIL HYDROCHLORIDE 2.5 MG/ML
INJECTION, SOLUTION INTRAVENOUS AS NEEDED
Status: DISCONTINUED | OUTPATIENT
Start: 2018-07-30 | End: 2018-07-30 | Stop reason: HOSPADM

## 2018-07-30 RX ORDER — HEPARIN SODIUM 1000 [USP'U]/ML
INJECTION, SOLUTION INTRAVENOUS; SUBCUTANEOUS AS NEEDED
Status: DISCONTINUED | OUTPATIENT
Start: 2018-07-30 | End: 2018-07-30 | Stop reason: HOSPADM

## 2018-07-30 RX ORDER — SODIUM CHLORIDE 0.9 % (FLUSH) 0.9 %
5-10 SYRINGE (ML) INJECTION EVERY 8 HOURS
Status: DISCONTINUED | OUTPATIENT
Start: 2018-07-30 | End: 2018-07-30 | Stop reason: HOSPADM

## 2018-07-30 NOTE — H&P
HPI: I saw Lior Alex in my office today in cardiovascular evaluation regarding his chronic coronary artery disease, atrial fibrillation, and aortic valvular disease to discuss his persistent shortness of breath with exertion and exertional anginal type chest discomfort. Mr. Gege Salamanca is a pleasant 80 y.o. white male who I had not seen for 15-20 years when he presented to the office on 3/21/16 with the above-captioned problems. I had originally seen him for Dr. Claudio Murray for new onset atrial fibrillation many years ago. I tried to do a cardioversion at one time and that was unsuccessful and we decided to control his heart rate with beta-blockers and anticoagulate him with Coumadin.  
   
He has been followed more recently by Dr. Tracey Sanon over the years, but began to have more shortness of breath and some chest tightness sounding anginal prompting him to come to see him on 3/21/16. Angélica Colorado did a nuclear myocardial perfusion study at that time which was read as low risk showing no signs of ischemia and low normal ejection fraction 52%. I increased his Toprol-XL and added Imdur to his regimen and he really did reasonably well until he came into my office on October 7, 2016 with a history of increasing shortness of breath and chest tightness with activity which is quite restricting and even though his nuclear myocardial perfusion study 6 months ago was negative his symptoms were so concerning that I decided to refer him directly for cardiac catheterization. 
   
The cardiac catheterization was completed by Dr. Shaka Watt on October 17, 2016 with findings of only moderate two-vessel coronary artery disease with a 70% mid LAD obstruction and a 60% mid circumflex obstructions neither of which were shown to have any hemodynamic significance with fractional flow reserve testing.  He did have a mean gradient of 29 mmHg across the aortic valve which was difficult to obtain adequate values for in view of the atrial fibrillation suggesting moderate aortic stenosis. He subsequently had an echocardiogram the same day which showed a mean gradient of 21 mmHg across the valve again suggesting mild to moderate aortic stenosis.    
When I saw him last on March 14, 2018 he related that his chest tightness with exertion have been getting more severe over the past couple of months. [facility] general internist Dr. Elias Louis had taken him off of Lopressor and given him some Pro-Air to try for his shortness of breath, but when I saw him his heart rate was 100 to 120 in atrial fibrillation and I started him on Toprol- mg daily and I also went ahead and did a pharmacologic myocardial perfusion study which was completed on March 16, 2018 and was read as an intermediate risk pharmacologic cardiac nuclear stress test primarily due to mildly depressed left ventricular systolic function with ejection fraction 45% in the setting of baseline atrial fibrillation with a controlled ventricular response, but there was no signs of focal ischemia or previous infarct on perfusion imaging. 
  
It should be noted that since his last visit he had an echocardiogram completed on June 7, 2018 which demonstrated completely normal left ventricular function with ejection fraction 55-60% in the setting of severe left atrial enlargement and signs of moderate aortic stenosis with a mean gradient of 25 mmHg across the valve. There really was not any significant change in this echo as compared to his echo in December 15, 2017. 
  
He comes in today and relates that his shortness of breath with exertion and chest tightness issues are getting more severe and over the past several weeks he had some problems with nocturnal angina for week or 2 which have actually's defervesced more recently but his chest tightness with exertion and shortness of breath is really quite restricting and greatly restricting his functional capacity.   He is not having any failure symptomatology denying orthopnea, paroxysmal nocturnal dyspnea or peripheral edema, but his discomfort in his chest with exertion relieved promptly by rest with associated shortness of breath very consistent with angina is clearly worsening. 
  
    
Encounter Diagnoses Name Primary?  Coronary artery disease involving native coronary artery with unstable angina pectoris, unspecified whether native or transplanted heart ([address] Yes  Persistent atrial fibrillation (HCC)    
 Aortic valve stenosis, moderate    
 Essential hypertension    
 Hyperlipidemia, unspecified hyperlipidemia type    
  
  
Discussion: This gentleman is having persistent worsening symptoms which certainly may be multifactorial since he does have atrial fibrillation with a ventricular response currently of around 100 and I have told him to increase his Toprol-XL from only 50 mg a day which he has been taking to 50 mg twice a day. 
  
He also has moderate aortic stenosis and moderately severe LAD and circumflex artery obstructions both of which could be working together with some uncontrolled heart rate in atrial fibrillation could cause his anginal symptoms.   However, his anginal symptoms are really getting much worse and even though his recent nuclear myocardial perfusion study did not clearly show ischemia I think we should repeat his cardiac catheterization to see if his LAD or circumflex obstruction has become significant to warrant stenting and to also assess the severity of his aortic stenosis if possible to more definitively define the severity of his aortic stenosis. 
  
I have tentatively schedule him for cardiac catheterization next week, but if he has any recurrent nocturnal angina which he had a few weeks ago even if it resolves within a few minutes I recommend he go into the hospital and be managed with therapeutic anticoagulation until we can complete his catheterization study in a more urgent fashion. 
  
PCP: Aj Grace MD 
  
  
    
Past Medical History:  
Diagnosis Date  Atrial fibrillation ([facility] 1998  CAD (coronary artery disease)    
 Cardiac nuclear imaging test, normal 04/01/2016  
  Low risk. No ischemia or prior infarction. EF 52%. Neg EKG on pharm stress test.  
 Diabetes ([facility]    
 DM (diabetes mellitus) ([facility]    
 GERD (gastroesophageal reflux disease)    
 Hematuria 2001  HLD (hyperlipidemia)    
 HTN (hypertension)    
 HTN (hypertension)    
 Hypothyroid    
 Pancreatitis    
  
  
     
Past Surgical History:  
Procedure Laterality Date  CARDIAC CATHETERIZATION   10/17/2016  
      
 CORONARY ARTERY ANGIOGRAM   10/17/2016  
      
 FRACTIONAL FLOW RESERVE   10/17/2016  
      
 FRACTIONAL FLOW RESERVE ADDL   10/17/2016  
      
 HX CYST REMOVAL      
  x4  
 LV ANGIOGRAPHY   10/17/2016  
      
  
  
           
Current Outpatient Rx Name   Route   Sig   Dispense   Refill  isosorbide mononitrate ER (IMDUR) 60 mg CR tablet   Oral   Take 1 Tab by mouth every morning.   90 Tab   2  
 aspirin delayed-release 81 mg tablet   Oral   Take 81 mg by mouth daily.          
 aclidinium bromide (TUDORZA PRESSAIR) 400 mcg/actuation inhaler   Inhalation   Take 1 Puff by inhalation daily.          
 simvastatin (ZOCOR) 40 mg tablet   Oral   Take 40 mg by mouth nightly.          
 warfarin (COUMADIN) 5 mg tablet   Oral   Take 5 mg by mouth. As directed. Followed by PCP          
 metFORMIN ER (GLUCOPHAGE XR) 500 mg tablet   Oral   Take 500 mg by mouth daily (with dinner).          
 levothyroxine (SYNTHROID) 100 mcg tablet   Oral   Take 100 mcg by mouth Daily (before breakfast).           
 HYDROcodone-acetaminophen (XODOL) 5-300 mg tablet   Oral   Take 1 Tab by mouth every six (6) hours as needed.          
 nitroglycerin (NITROSTAT) 0.4 mg SL tablet   SubLINGual   0.4 mg by SubLINGual route every five (5) minutes as needed for Chest Pain.          
 umeclidinium-vilanterol 62.5-25 mcg/actuation dsdv   Inhalation   Take 1 Puff by inhalation daily.          
 cholecalciferol, vitamin D3, 2,000 unit tab   Oral   Take 1 Tab by mouth daily.          
 metoprolol succinate (TOPROL-XL) 100 mg XL tablet   Oral   Take 1.5 Tabs by mouth daily.   60 Tab   3  
  
  
No Known Allergies 
  
Social History : 
       
Social History Substance Use Topics  Smoking status: Current Every Day Smoker  
    Packs/day: 0.50  
    Years: 52.00  Smokeless tobacco: Never Used  Alcohol use Yes   
      Comment: every other day  
  
  
Family History: family history includes Diabetes in his father and mother; Stroke in his father.   
  
Review of Systems:   
 Constitutional: Negative for chills, fever, malaise/fatigue and weight loss. Respiratory: Positive for shortness of breath. Negative for cough, hemoptysis and wheezing. Cardiovascular: Negative for chest pain, palpitations, orthopnea and leg swelling. Gastrointestinal: Positive for heartburn. Negative for abdominal pain, blood in stool, constipation, diarrhea, melena, nausea and vomiting. Musculoskeletal: Positive for joint pain. Negative for falls and myalgias. Neurological: Negative for dizziness.  
  
Physical Exam:   
The patient is a cooperative, alert, well developed, well nourished 72 y.o. white  male with full white beard who is in no acute distress at the time of the examination. Visit Vitals  /62  Pulse 100  
 Ht 5' 9\" (1.753 m)  Wt 109.8 kg (242 lb)  SpO2 97%  BMI 35.74 kg/m2 HEENT: Conjuctiva white, mucosa moist, no pallor or cyanosis. Xanthelasmas on the medial aspect of both eyelids. NECK: Supple without masses, tenderness or thyromegaly. There was no jugular venous distention. Carotid are full bilaterally without bruits. CHEST: Symmetrical with good excursion. LUNGS: Clear to auscultation in all fields. HEART: The apex is not displaced. There were no lifts, thrills or heaves. There is a normal S1 and S2.   There is a grade 2/6 mid to late peaking systolic ejection murmur along left sternal border with radiation to the base without appreciable diastolic murmurs, rubs, clicks, or gallops auscultated. ABDOMEN: Soft without masses, tenderness or organomegaly. EXTREMITIES: Full peripheral pulses without peripheral edema. INTEGUMENT: Warm and dry NEUROLOGICAL: The patient is oriented x 3 with motor function grossly intact. 
  
Review of Data: See PMH and Cardiology and Imaging sections for cardiac testing 
  
  
   
Results for orders placed or performed in visit on 07/25/18 AMB POC EKG ROUTINE W/ 12 LEADS, INTER & REP     Status: None  
  Narrative  
  Atrial fibrillation with ventricular response around 100. There is a left axis deviation and a pleat interventricular conduction delay of indeterminate variety. Compared to the EKG of March 14, 2018 heart rate is actually somewhat slower.  
  
  
  
Addendum: 
 
Patient with known history of moderate aortic stenosis and moderate two-vessel disease with progressively, worsening unstable angina. We will proceed with repeat heart catheterization. [facility] 
7/30/18

## 2018-07-30 NOTE — IP AVS SNAPSHOT
[facility] 
 
 
 [address] [facility] [address] [place] [address] Rd Patient: Dereck Hammans MRN: [id number] MML:[id number] About your hospitalization You were admitted on:  July 30, 2018 You last received care in the:  [facility] 1 CATH HOLDING You were discharged on:  July 30, 2018 Why you were hospitalized Your primary diagnosis was:  Not on File Follow-up Information Follow up With Details Comments Contact Info Nany Charlton MD In 2 weeks  [address] [address] 
[phone] Your Scheduled Appointments Tuesday August 07, 2018  2:00 PM EDT  
CT LOW DOSE LUNG CA SCREEN with [facility] CT RM 2 [facility] RAD CT [facility]) [address] [place] [address] [address] [facility] GENERAL INSTRUCTIONS  This study does not require you to drink contrast prior to your study. RELATED STUDY INFORMATION  Bring any films, CDs, and reports related with you on the day of your exam.  This only includes studies done outside of [address] [place] [address], [facility], [place], and [place]. QUESTIONS  Notify the CT Department if you have any questions concerning your study. [place] - [phone] [facility] [place] - [phone] CHECK IN INSTRUCTIONS:  Check in to Patient Registration in the front of the hospital 30 minutes prior to your appointment. DR. [facility]AN[facility] [address], [address] Wednesday September 05, 2018  2:40 PM EDT Follow Up with Maximiliano Correa DO [facility] ([address]) [facility] [address] [address] [address] [phone] Discharge Orders None A check marcelle indicates which time of day the medication should be taken. My Medications CONTINUE taking these medications Instructions Each Dose to Equal  
 Morning Noon Evening Bedtime albuterol 90 mcg/actuation inhaler Commonly known as:  PROVENTIL HFA, VENTOLIN HFA, PROAIR HFA Your last dose was: Your next dose is: Take 2 Puffs by inhalation every six (6) hours as needed for Wheezing. 2 Puff ANORO ELLIPTA 62.5-25 mcg/actuation inhaler Generic drug:  umeclidinium-vilanterol Your last dose was: Your next dose is: Take 1 Puff by inhalation daily. 1 Puff  
    
   
   
   
  
 aspirin delayed-release 81 mg tablet Your last dose was: Your next dose is:    
   
   
 1 tablet  
     
   
   
   
  
 cholecalciferol (vitamin D3) 2,000 unit Tab Your last dose was: Your next dose is: Take 1 Tab by mouth daily. 1 Tab COUMADIN 5 mg tablet Generic drug:  warfarin Your last dose was: Your next dose is: Take 1 tablet by mouth once a day  
     
   
   
   
  
 furosemide 20 mg tablet Commonly known as:  LASIX Your last dose was: Your next dose is: Take 1 Tab by mouth daily. 20 mg  
    
   
   
   
  
 isosorbide mononitrate ER 60 mg CR tablet Commonly known as:  IMDUR Your last dose was: Your next dose is: TAKE 1 TABLET BY MOUTH  EVERY MORNING  
     
   
   
   
  
 levothyroxine 150 mcg tablet Commonly known as:  SYNTHROID Your last dose was: Your next dose is: Take 150 mcg by mouth daily. 150 mcg  
    
   
   
   
  
 metoprolol succinate 100 mg tablet Commonly known as:  TOPROL-XL Your last dose was: Your next dose is: Take 1.5 Tabs by mouth daily. 150 mg  
    
   
   
   
  
 nitroglycerin 0.4 mg SL tablet Commonly known as:  NITROSTAT Your last dose was: Your next dose is:    
   
   
 1 Tab by SubLINGual route every five (5) minutes as needed for Chest Pain.   
 0.4 mg  
    
   
 ONE DAILY GUMMY VITES PO Your last dose was: Your next dose is: Take  by mouth daily. pantoprazole 40 mg tablet Commonly known as:  PROTONIX Your last dose was: Your next dose is: Take 40 mg by mouth daily. 40 mg  
    
   
   
   
  
 simvastatin 40 mg tablet Commonly known as:  ZOCOR Your last dose was: Your next dose is: Take 40 mg by mouth daily. 40 mg  
    
   
   
   
  
  
STOP taking these medications   
 metFORMIN  mg tablet Commonly known as:  GLUCOPHAGE XR Discharge Instructions DISCHARGE SUMMARY from Nurse PATIENT INSTRUCTIONS: 
 
 
F-face looks uneven A-arms unable to move or move unevenly S-speech slurred or non-existent T-time-call 911 as soon as signs and symptoms begin-DO NOT go Back to bed or wait to see if you get better-TIME IS BRAIN. Warning Signs of HEART ATTACK Call 911 if you have these symptoms: 
? Chest discomfort. Most heart attacks involve discomfort in the center of the chest that lasts more than a few minutes, or that goes away and comes back. It can feel like uncomfortable pressure, squeezing, fullness, or pain. ? Discomfort in other areas of the upper body. Symptoms can include pain or discomfort in one or both arms, the back, neck, jaw, or stomach. ? Shortness of breath with or without chest discomfort. ? Other signs may include breaking out in a cold sweat, nausea, or lightheadedness. Don't wait more than five minutes to call [address]! Fast action can save your life.  Calling 911 is almost always the fastest way to get lifesaving treatment. Emergency Medical Services staff can begin treatment when they arrive  up to an hour sooner than if someone gets to the hospital by car. The discharge information has been reviewed with the patient and spouse. The patient and spouse verbalized understanding. Discharge medications reviewed with the patient and spouse                 Cardiac Catheterization/Angiography Discharge Instructions *Check the puncture site frequently for swelling or bleeding. If you see any bleeding, lie down and apply pressure over the area with a clean town or washcloth. Notify your doctor for any redness, swelling, drainage or oozing from the puncture site. Notify your doctor for any fever or chills. *If the leg or arm with the puncture becomes cold, numb or painful, call Dr Nika Groves at  [phone] *Activity should be limited for the next 48 hours. Climb stairs as little as possible and avoid any stooping, bending or strenuous activity for 48 hours. No heavy lifting (anything over 10 pounds) for three days. *Do not drive for 48 hours. *You may resume your usual diet. Drink more fluids than usual. 
 
*Have a responsible person drive you home and stay with you for at least 24 hours after your heart catheterization/angiography. *You may remove the bandage from your Right wrist in 24 hours. You may shower in 24 hours. No tub baths, hot tubs or swimming for one week. Do not place any lotions, creams, powders, ointments over the puncture site for one week. You may place a clean band-aid over the puncture site each day for 5 days. Change this daily. and appropriate educational materials and side effects teaching were provided. ___________________________________________________________________________________________________________________________________ Introducing Yair Dean As a Peter Single patient, I wanted to make you aware of our electronic visit tool called [employer]. Wimdu/7 allows you to connect within minutes with a medical provider 24 hours a day, seven days a week via a mobile device or tablet or logging into a secure website from your computer. You can access [employer] from anywhere in the [place]. A virtual visit might be right for you when you have a simple condition and feel like you just dont want to get out of bed, or cant get away from work for an appointment, when your regular [facility] provider is not available (evenings, weekends or holidays), or when youre out of town and need minor care. Electronic visits cost only $49 and if the Wim[employer]/7 provider determines a prescription is needed to treat your condition, one can be electronically transmitted to a nearby pharmacy*. Please take a moment to enroll today if you have not already done so. The enrollment process is free and takes just a few minutes. To enroll, please download the Wimdu/Tasspass cary to your tablet or phone, or visit [email or web address] to enroll on your computer. And, as an [address] [place] [address] patient with a [employer] account, the results of your visits will be scanned into your electronic medical record and your primary care provider will be able to view the scanned results. We urge you to continue to see your regular [facility] provider for your ongoing medical care. And while your primary care provider may not be the one available when you seek a Yair Donaldanum[employer] virtual visit, the peace of mind you get from getting a real diagnosis real time can be priceless. For more information on [employer], view our Frequently Asked Questions (FAQs) at [email or web address]. Sincerely, 
 
Francisco Niño MD 
Chief Medical Officer Joseph Christianson *:  certain medications cannot be prescribed via [employer] Providers Seen During Your Hospitalization Provider Specialty Primary office phone Ashley Coffman MD Cardiology [phone] Your Primary Care Physician (PCP) Primary Care Physician Office Phone Office Fax Niurka Guerrero [phone] You are allergic to the following No active allergies Recent Documentation Height Weight BMI Smoking Status 1.753 m 109.8 kg 35.74 kg/m2 Current Every Day Smoker Emergency Contacts Name Discharge Info Relation Home Work Mobile ColladoTiffanie moreno DISCHARGE CAREGIVER [3] Spouse [3] [phone] Patient Belongings The following personal items are in your possession at time of discharge: 
     Visual Aid: None Please provide this summary of care documentation to your next provider. Signatures-by signing, you are acknowledging that this After Visit Summary has been reviewed with you and you have received a copy. Patient Signature:  ____________________________________________________________ Date:  ____________________________________________________________  
  
[address] Provider Signature:  ____________________________________________________________ Date:  ____________________________________________________________

## 2018-07-30 NOTE — Clinical Note
MACD (Max Contrast Calc Dose): 
Patient weight (kg) = 109.8. Creatinine level (mg/dL) = 1.4. Contrast concentration (mg/mL) = 300. MACD = 300 mL.

## 2018-07-30 NOTE — Clinical Note
TRANSFER - OUT REPORT:  
 
Verbal report given to: HUBER GUZMAN RN. Report consisted of patient's Situation, Background, Assessment and  
Recommendations(SBAR). Opportunity for questions and clarification was provided. Patient transported with a Cardiac Cath Tech / Patient Care Tech. Patient transported to: [address]

## 2018-07-30 NOTE — Clinical Note
Right groin and right radial prepped with ChloraPrep and draped. Wet prep solution applied at: 1123. Wet prep solution dried at: 1126. Wet prep elapsed drying time: 3 mins.

## 2018-08-07 ENCOUNTER — HOSPITAL ENCOUNTER (OUTPATIENT)
Dept: CT IMAGING | Age: 73
Discharge: HOME | End: 2018-08-07
Attending: INTERNAL MEDICINE
Payer: MEDICARE

## 2018-08-07 VITALS — HEIGHT: 69 IN | WEIGHT: 242 LBS | BODY MASS INDEX: 35.84 KG/M2

## 2018-08-07 DIAGNOSIS — Z12.2 ENCOUNTER FOR SCREENING FOR MALIGNANT NEOPLASM OF RESPIRATORY ORGANS: ICD-10-CM

## 2018-08-07 DIAGNOSIS — F17.219 NICOTINE DEPENDENCE, CIGARETTES, W UNSP DISORDERS: ICD-10-CM

## 2018-08-07 DIAGNOSIS — J44.9 CHRONIC OBSTRUCTIVE PULMONARY DISEASE (HCC): ICD-10-CM

## 2018-08-07 PROCEDURE — G0297 LDCT FOR LUNG CA SCREEN: HCPCS

## 2018-09-05 ENCOUNTER — OFFICE VISIT (OUTPATIENT)
Dept: CARDIOLOGY CLINIC | Age: 73
End: 2018-09-05

## 2018-09-05 VITALS
OXYGEN SATURATION: 98 % | WEIGHT: 238 LBS | DIASTOLIC BLOOD PRESSURE: 62 MMHG | HEART RATE: 93 BPM | SYSTOLIC BLOOD PRESSURE: 104 MMHG | BODY MASS INDEX: 35.25 KG/M2 | HEIGHT: 69 IN

## 2018-09-05 DIAGNOSIS — I25.10 CORONARY ARTERY DISEASE INVOLVING NATIVE CORONARY ARTERY OF NATIVE HEART WITHOUT ANGINA PECTORIS: Primary | ICD-10-CM

## 2018-09-05 DIAGNOSIS — R07.89 CHEST PRESSURE: ICD-10-CM

## 2018-09-05 DIAGNOSIS — I10 ESSENTIAL HYPERTENSION: ICD-10-CM

## 2018-09-05 DIAGNOSIS — E78.5 HYPERLIPIDEMIA, UNSPECIFIED HYPERLIPIDEMIA TYPE: ICD-10-CM

## 2018-09-05 DIAGNOSIS — I48.19 PERSISTENT ATRIAL FIBRILLATION (HCC): ICD-10-CM

## 2018-09-05 RX ORDER — METFORMIN HYDROCHLORIDE 500 MG/1
500 TABLET, EXTENDED RELEASE ORAL 2 TIMES DAILY
COMMUNITY
Start: 2018-07-19

## 2018-09-05 NOTE — PROGRESS NOTES
HPI: I saw Vernon Ivan in my office today in cardiovascular evaluation regarding his chronic coronary artery disease, atrial fibrillation, and aortic valvular disease to discuss his persistent shortness of breath with exertion and exertional anginal type chest discomfort. Mr. Dot Marvin is a pleasant 80 y.o. white male who I had not seen for 15-20 years when he presented to the office on 3/21/16 with the above-captioned problems. I had originally seen him for Dr. Nancie Escobar for new onset atrial fibrillation many years ago. I tried to do a cardioversion at one time and that was unsuccessful and we decided to control his heart rate with beta-blockers and anticoagulate him with Coumadin.  
   
He has been followed more recently by Dr. Paola Michelle over the years, but began to have more shortness of breath and some chest tightness sounding anginal prompting him to come to see him on 3/21/16. Genevieve Harris did a nuclear myocardial perfusion study at that time which was read as low risk showing no signs of ischemia and low normal ejection fraction 52%. I increased his Toprol-XL and added Imdur to his regimen and he really did reasonably well until he came into my office on 2016 with a history of increasing shortness of breath and chest tightness with activity which is quite restricting and even though his nuclear myocardial perfusion study 6 months ago was negative his symptoms were so concerning that I decided to refer him directly for cardiac catheterization. 
   
The cardiac catheterization was completed by Dr. Mara Aguilar on 2016 with findings of only moderate two-vessel coronary artery disease with a 70% mid LAD obstruction and a 60% mid circumflex obstructions neither of which were shown to have any hemodynamic significance with fractional flow reserve testing.  He did have a mean gradient of 29 mmHg across the aortic valve which was difficult to obtain adequate values for in view of the atrial fibrillation suggesting moderate aortic stenosis. He subsequently had an echocardiogram the same day which showed a mean gradient of 21 mmHg across the valve again suggesting mild to moderate aortic stenosis.    
When I saw him last on March 14, 2018 he related that his chest tightness with exertion have been getting more severe over the past couple of months. [facility] general internist Dr. Caitlyn Shane had taken him off of Lopressor and given him some Pro-Air to try for his shortness of breath, but when I saw him his heart rate was 100 to 120 in atrial fibrillation and I started him on Toprol- mg daily and I also went ahead and did a pharmacologic myocardial perfusion study which was completed on March 16, 2018 and was read as an intermediate risk pharmacologic cardiac nuclear stress test primarily due to mildly depressed left ventricular systolic function with ejection fraction 45% in the setting of baseline atrial fibrillation with a controlled ventricular response, but there was no signs of focal ischemia or previous infarct on perfusion imaging. 
  
It should be noted that since his last visit he had an echocardiogram completed on June 7, 2018 which demonstrated completely normal left ventricular function with ejection fraction 55-60% in the setting of severe left atrial enlargement and signs of moderate aortic stenosis with a mean gradient of 25 mmHg across the valve. There really was not any significant change in this echo as compared to his echo in December 15, 2017. Persistent symptoms he had yet another cardiac catheterization completed on July 30, 2018 which demonstrated: 1.  30-40% ostial left main trunk obstruction. 2.  50-60% mid LAD obstruction. 3.  40% mid nondominant circumflex obstruction. Daniel Langley 4.  Mild diffuse disease throughout the dominant right coronary artery. 5.  Moderate aortic stenosis with mean gradient of 25 mmHg across the aortic valve. 6.  Normal left ventricular end-diastolic pressure. 7.  Normal left ventriculogram. 
 
Since there was no high-grade obstructions of either his coronary artery tree or his aortic valve medical therapy was simply continued. He comes in today and relates that he still has shortness of breath with exertion and chest pain issues. His chest pain as a chest tightness which was substernal and has been occurring primarily with activity but is also had it occasionally at rest and occasionally he will have awakened from sleep. Interestingly, when it wakes him from sleep if he will sit up and drink water and burp the discomfort will go away in 5-10 minutes. Encounter Diagnoses Name Primary?  Chest pressure, noncardiac suspect GERD  Coronary artery disease involving native coronary artery of native heart without angina pectoris Yes  Persistent atrial fibrillation (HCC)  Essential hypertension  Hyperlipidemia, unspecified hyperlipidemia type Discussion: This patient's chest discomforts which occur with exertion have also been occurring at rest from time to time and can wake him from sleep and certainly his description regarding his sleep discomfort sounds like reflux. He is on Protonix 40 mg daily and currently was on Dexilant for couple of weeks and his symptoms have improved, but he cannot afford to buy the Dexilant long-term. It should also be noted that he has quit smoking for the most part over the past month or so as well just restarting a few cigarettes recently, but if his problem is reflux certainly the smoking could make that problem worse. I would recommend that he double up his Protonix and take 40 mg twice daily for couple weeks to see if his symptoms improve and if they do then I would consider further GI evaluation to more clearly define and treat this problem moving forward.  
 
He does have moderate obstructive coronary disease as described in his recent cardiac catheterization as well as moderate aortic stenosis, but neither problem seems to be severe enough to cause any of his symptoms. I would plan to continue him on his current medications and I would call in some sublingual nitroglycerin so that he has in the future even though at this juncture I suspect the problem is related to reflux. His latest lipid profile is not available to me times this dictation, but I certainly think we should aggressively treat his cholesterol and if his LDL is not under 70 I would tend to switch Zocor to either Lipitor or Crestor. Since the patient is otherwise doing well I will see him again in several months. PCP: Franky Roman MD 
 
 
Past Medical History:  
Diagnosis Date  Atrial fibrillation ([facility] [address] 1998  CAD (coronary artery disease)  Cardiac nuclear imaging test, normal 04/01/2016 Low risk. No ischemia or prior infarction. EF 52%. Neg EKG on pharm stress test.  
 Diabetes ([address]  DM (diabetes mellitus) ([facility] [address]  GERD (gastroesophageal reflux disease)  Hematuria 2001  HLD (hyperlipidemia)  HTN (hypertension)  HTN (hypertension)  Hypothyroid  Pancreatitis Past Surgical History:  
Procedure Laterality Date  CARDIAC CATHETERIZATION  10/17/2016  CORONARY ARTERY ANGIOGRAM  10/17/2016  FRACTIONAL FLOW RESERVE  10/17/2016  FRACTIONAL FLOW RESERVE ADDL  10/17/2016  HX CYST REMOVAL    
 x4  
 LV ANGIOGRAPHY  10/17/2016 Current Outpatient Rx Name  Route  Sig  Dispense  Refill  isosorbide mononitrate ER (IMDUR) 60 mg CR tablet Oral 
  Take 1 Tab by mouth every morning. 90 Tab 
  2 
  
 aspirin delayed-release 81 mg tablet Oral 
  Take 81 mg by mouth daily.  aclidinium bromide (TUDORZA PRESSAIR) 400 mcg/actuation inhaler Inhalation Take 1 Puff by inhalation daily.  simvastatin (ZOCOR) 40 mg tablet   Oral 
 Take 40 mg by mouth nightly.  warfarin (COUMADIN) 5 mg tablet Oral 
  Take 5 mg by mouth. As directed. Followed by PCP 
   
   
  
 metFORMIN ER (GLUCOPHAGE XR) 500 mg tablet Oral 
  Take 500 mg by mouth daily (with dinner).  levothyroxine (SYNTHROID) 100 mcg tablet Oral 
  Take 100 mcg by mouth Daily (before breakfast).  HYDROcodone-acetaminophen (XODOL) 5-300 mg tablet Oral 
  Take 1 Tab by mouth every six (6) hours as needed.  nitroglycerin (NITROSTAT) 0.4 mg SL tablet SubLINGual 
  0.4 mg by SubLINGual route every five (5) minutes as needed for Chest Pain.  umeclidinium-vilanterol 62.5-25 mcg/actuation dsdv Inhalation Take 1 Puff by inhalation daily.  cholecalciferol, vitamin D3, 2,000 unit tab Oral 
  Take 1 Tab by mouth daily.  metoprolol succinate (TOPROL-XL) 100 mg XL tablet Oral 
  Take 1.5 Tabs by mouth daily. 60 Tab 
  3 No Known Allergies Social History : 
Social History Substance Use Topics  Smoking status: Current Every Day Smoker Packs/day: 0.50 Years: 52.00  Smokeless tobacco: Never Used  Alcohol use Yes Comment: every other day Family History: family history includes Diabetes in his father and mother; Stroke in his father. Review of Systems:   
Constitutional: Positive for malaise/fatigue. Negative for chills, fever and weight loss. Respiratory: Negative for cough, hemoptysis, shortness of breath and wheezing. Cardiovascular: Positive for chest pain. Negative for palpitations, orthopnea and leg swelling. Gastrointestinal: Negative. Musculoskeletal: Negative for falls, joint pain and myalgias. Neurological: Negative for dizziness. Physical Exam:  The patient is a cooperative, alert, well developed, well nourished 72 y.o. white  male with full white beard who is in no acute distress at the time of the examination. Visit Vitals  /62  Pulse 93  
 Ht 5' 9\" (1.753 m)  Wt 108 kg (238 lb)  SpO2 98%  BMI 35.15 kg/m2 HEENT: Conjuctiva white, mucosa moist, no pallor or cyanosis. Xanthelasmas on the medial aspect of both eyelids. NECK: Supple without masses, tenderness or thyromegaly. There was no jugular venous distention. Carotid are full bilaterally without bruits. CHEST: Symmetrical with good excursion. LUNGS: Clear to auscultation in all fields. HEART: The apex is not displaced. There were no lifts, thrills or heaves. There is a normal S1 and S2. There is a grade 2/6 mid to late peaking systolic ejection murmur along left sternal border with radiation to the base without appreciable diastolic murmurs, rubs, clicks, or gallops auscultated. ABDOMEN: Soft without masses, tenderness or organomegaly. EXTREMITIES: Full peripheral pulses without peripheral edema. INTEGUMENT: Warm and dry NEUROLOGICAL: The patient is oriented x 3 with motor function grossly intact. Review of Data: See PMH and Cardiology and Imaging sections for cardiac testing Results for orders placed or performed in visit on 07/25/18 AMB POC EKG ROUTINE W/ 12 LEADS, INTER & REP     Status: None Narrative Atrial fibrillation with ventricular response around 100. There is a left axis deviation and a pleat interventricular conduction delay of indeterminate variety. Compared to the EKG of March 14, 2018 heart rate is actually somewhat slower. Leona Spivey D.O., F.A.C.C. [facility] [place] [address] [place] [address] [address] [place] [id number] Micaela Pineda [phone] B  [phone] PLEASE NOTE:  This document has been produced using voice recognition software. Unrecognized errors in transcription may be present.

## 2018-09-05 NOTE — MR AVS SNAPSHOT
[address] [place] [address] Chari Tony [id number] [phone] Patient: Mxaine Tsai MRN: [id number] RZR:[id number] Visit Information Date & Time Provider Department Dept. Phone Encounter #  
 9/5/2018  2:40 PM Anjali Silver, [facility] [address] [id number] Upcoming Health Maintenance Date Due Hepatitis C Screening 1945 FOOT EXAM Q1 4/3/1957 EYE EXAM RETINAL OR DILATED Q1 4/3/1957 DTaP/Tdap/Td series (1 - Tdap) 4/3/1968 ZOSTER VACCINE AGE 60> 2/3/2007 MICROALBUMIN Q1 11/4/2010 GLAUCOMA SCREENING Q2Y 4/3/2012 Pneumococcal 65+ Low/Medium Risk (1 of 2 - PCV13) 4/3/2012 MEDICARE YEARLY EXAM 3/19/2018 Influenza Age 5 to Adult 8/1/2018 HEMOGLOBIN A1C Q6M 10/13/2018 LIPID PANEL Q1 4/13/2019 FOBT Q 1 YEAR AGE 50-75 4/13/2019 Allergies as of 9/5/2018  Review Complete On: 9/5/2018 By: Anjali Silver, DO No Known Allergies Current Immunizations  Never Reviewed No immunizations on file. Not reviewed this visit You Were Diagnosed With   
  
 Codes Comments Coronary artery disease involving native coronary artery of native heart without angina pectoris    -  Primary ICD-10-CM: I25.10 ICD-9-CM: 414.01 Persistent atrial fibrillation (HCC)     ICD-10-CM: I48.1 ICD-9-CM: 427.31 Essential hypertension     ICD-10-CM: I10 
ICD-9-CM: 401.9 Hyperlipidemia, unspecified hyperlipidemia type     ICD-10-CM: E78.5 ICD-9-CM: 272.4 Vitals BP Pulse Height(growth percentile) Weight(growth percentile) SpO2 BMI  
 104/62 93 5' 9\" (1.753 m) 238 lb (108 kg) 98% 35.15 kg/m2 Smoking Status Current Every Day Smoker Vitals History BMI and BSA Data Body Mass Index Body Surface Area  
 35.15 kg/m 2 2.29 m 2 Preferred Pharmacy Pharmacy Name Phone 305 [facility], [address] [place] [address] [address] Audrey Mcclelland Your Updated Medication List  
  
   
This list is accurate as of 9/5/18  2:50 PM.  Always use your most recent med list.  
  
  
  
  
 albuterol 90 mcg/actuation inhaler Commonly known as:  PROVENTIL HFA, VENTOLIN HFA, PROAIR HFA Take 2 Puffs by inhalation every six (6) hours as needed for Wheezing. ANORO ELLIPTA 62.5-25 mcg/actuation inhaler Generic drug:  umeclidinium-vilanterol Take 1 Puff by inhalation daily. aspirin delayed-release 81 mg tablet 1 tablet  
  
 cholecalciferol (vitamin D3) 2,000 unit Tab Take 1 Tab by mouth daily. COUMADIN 5 mg tablet Generic drug:  warfarin Take 1 tablet by mouth once a day  
  
 furosemide 20 mg tablet Commonly known as:  LASIX Take 1 Tab by mouth daily. isosorbide mononitrate ER 60 mg CR tablet Commonly known as:  IMDUR  
TAKE 1 TABLET BY MOUTH  EVERY MORNING  
  
 levothyroxine 150 mcg tablet Commonly known as:  SYNTHROID Take 150 mcg by mouth daily. metFORMIN  mg tablet Commonly known as:  GLUCOPHAGE XR  
  
 metoprolol succinate 100 mg tablet Commonly known as:  TOPROL-XL Take 1.5 Tabs by mouth daily. nitroglycerin 0.4 mg SL tablet Commonly known as:  NITROSTAT  
1 Tab by SubLINGual route every five (5) minutes as needed for Chest Pain. ONE DAILY GUMMY VITES PO Take  by mouth daily. pantoprazole 40 mg tablet Commonly known as:  PROTONIX Take 40 mg by mouth daily. simvastatin 40 mg tablet Commonly known as:  ZOCOR Take 40 mg by mouth daily. We Performed the Following AMB POC EKG ROUTINE W/ 12 LEADS, INTER & REP [65321 CPT(R)] Please provide this summary of care documentation to your next provider. Your primary care clinician is listed as Aurora Lewis. If you have any questions after today's visit, please call [phone].

## 2018-09-05 NOTE — PROGRESS NOTES
Review of Systems Constitutional: Positive for malaise/fatigue. Negative for chills, fever and weight loss. Respiratory: Negative for cough, hemoptysis, shortness of breath and wheezing. Cardiovascular: Positive for chest pain. Negative for palpitations, orthopnea and leg swelling. Gastrointestinal: Negative. Musculoskeletal: Negative for falls, joint pain and myalgias. Neurological: Negative for dizziness.

## 2018-09-07 RX ORDER — NITROGLYCERIN 0.4 MG/1
0.4 TABLET SUBLINGUAL
Qty: 1 BOTTLE | Refills: 3 | Status: SHIPPED | OUTPATIENT
Start: 2018-09-07

## 2018-09-07 RX ORDER — METOPROLOL SUCCINATE 100 MG/1
TABLET, EXTENDED RELEASE ORAL
Qty: 135 TAB | Refills: 3 | Status: SHIPPED | OUTPATIENT
Start: 2018-09-07 | End: 2019-03-22 | Stop reason: SDUPTHER

## 2019-01-14 RX ORDER — ISOSORBIDE MONONITRATE 60 MG/1
TABLET, EXTENDED RELEASE ORAL
Qty: 90 TAB | Refills: 3 | Status: SHIPPED | OUTPATIENT
Start: 2019-01-14 | End: 2019-03-22 | Stop reason: SDUPTHER

## 2019-01-23 ENCOUNTER — NURSE NAVIGATOR (OUTPATIENT)
Dept: OTHER | Age: 74
End: 2019-01-23

## 2019-01-23 NOTE — NURSE NAVIGATOR
Mr. Lorna Mcmahon has a CT chest w/ contrast scheduled for 1/24/19. He will verify with Dr. Avery Crum at his follow up whether he still needs the LDCT in August as he was advised that he only needs 1 CT a year to monitor his lung nodules. Julienne Schuler, BSN, RN, O[facility] Nurse Navigator contact guard

## 2019-01-24 ENCOUNTER — HOSPITAL ENCOUNTER (OUTPATIENT)
Dept: CT IMAGING | Age: 74
Discharge: HOME | End: 2019-01-24
Attending: INTERNAL MEDICINE
Payer: MEDICARE

## 2019-01-24 DIAGNOSIS — D11.7 BENIGN NEOPLASM OF OTHER MAJOR SALIVARY GLANDS: ICD-10-CM

## 2019-01-24 DIAGNOSIS — R91.1 SOLITARY LUNG NODULE: ICD-10-CM

## 2019-01-24 DIAGNOSIS — R91.1 PULMONARY NODULE: ICD-10-CM

## 2019-01-24 LAB — CREAT UR-MCNC: 1.3 MG/DL (ref 0.6–1.3)

## 2019-01-24 PROCEDURE — 70490 CT SOFT TISSUE NECK W/O DYE: CPT

## 2019-01-24 PROCEDURE — 82565 ASSAY OF CREATININE: CPT

## 2019-01-24 PROCEDURE — 74011636320 HC RX REV CODE- 636/320

## 2019-01-24 PROCEDURE — 71260 CT THORAX DX C+: CPT

## 2019-01-24 RX ADMIN — IOPAMIDOL 75 ML: 612 INJECTION, SOLUTION INTRAVENOUS at 11:38

## 2019-03-21 RX ORDER — FUROSEMIDE 20 MG/1
TABLET ORAL
Qty: 90 TAB | Refills: 3 | Status: SHIPPED | OUTPATIENT
Start: 2019-03-21 | End: 2020-03-18

## 2019-03-22 RX ORDER — METOPROLOL SUCCINATE 100 MG/1
TABLET, EXTENDED RELEASE ORAL
Qty: 135 TAB | Refills: 3 | Status: SHIPPED | OUTPATIENT
Start: 2019-03-22 | End: 2020-01-24

## 2019-03-22 RX ORDER — ISOSORBIDE MONONITRATE 60 MG/1
TABLET, EXTENDED RELEASE ORAL
Qty: 90 TAB | Refills: 3 | Status: SHIPPED | OUTPATIENT
Start: 2019-03-22 | End: 2020-03-18

## 2019-04-17 ENCOUNTER — OFFICE VISIT (OUTPATIENT)
Dept: CARDIOLOGY CLINIC | Age: 74
End: 2019-04-17

## 2019-04-17 VITALS
OXYGEN SATURATION: 98 % | HEART RATE: 106 BPM | WEIGHT: 246 LBS | DIASTOLIC BLOOD PRESSURE: 68 MMHG | SYSTOLIC BLOOD PRESSURE: 110 MMHG | BODY MASS INDEX: 36.43 KG/M2 | HEIGHT: 69 IN

## 2019-04-17 DIAGNOSIS — I10 ESSENTIAL HYPERTENSION: ICD-10-CM

## 2019-04-17 DIAGNOSIS — I25.10 CORONARY ARTERY DISEASE INVOLVING NATIVE CORONARY ARTERY OF NATIVE HEART WITHOUT ANGINA PECTORIS: Primary | ICD-10-CM

## 2019-04-17 DIAGNOSIS — I35.0 AORTIC VALVE STENOSIS, ETIOLOGY OF CARDIAC VALVE DISEASE UNSPECIFIED: ICD-10-CM

## 2019-04-17 DIAGNOSIS — R06.09 OTHER FORM OF DYSPNEA: ICD-10-CM

## 2019-04-17 DIAGNOSIS — I48.19 PERSISTENT ATRIAL FIBRILLATION (HCC): ICD-10-CM

## 2019-04-17 DIAGNOSIS — E78.5 HYPERLIPIDEMIA, UNSPECIFIED HYPERLIPIDEMIA TYPE: ICD-10-CM

## 2019-04-17 NOTE — PROGRESS NOTES
HPI: I saw Marlon Graham in my office today in cardiovascular evaluation regarding his chronic coronary artery disease, atrial fibrillation, and aortic valvular disease to discuss his persistent shortness of breath with exertion and exertional anginal type chest discomfort. Mr. Deep Figueredo is a pleasant 74 y. o. white male who I had not seen for 15-20 years when he presented to the office on 3/21/16 with the above-captioned problems. I had originally seen him for Dr. Emeterio Jerry for new onset atrial fibrillation many years ago. I tried to do a cardioversion at one time and that was unsuccessful and we decided to control his heart rate with beta-blockers and anticoagulate him with Coumadin. He has been followed more recently by Dr. Drew Sharma over the years, but began to have more shortness of breath and some chest tightness sounding anginal prompting him to come to see him on 3/21/16. Annie Rodriguez did a nuclear myocardial perfusion study at that time which was read as low risk showing no signs of ischemia and low normal ejection fraction 52%. I increased his Toprol-XL and added Imdur to his regimen and he really did reasonably well until he came into my office on October 7, 2016 with a history of increasing shortness of breath and chest tightness with activity which is quite restricting and even though his nuclear myocardial perfusion study 6 months ago was negative his symptoms were so concerning that I decided to refer him directly for cardiac catheterization. 
   
The cardiac catheterization was completed by Dr. Azul Flores on October 17, 2016 with findings of only moderate two-vessel coronary artery disease with a 70% mid LAD obstruction and a 60% mid circumflex obstructions neither of which were shown to have any hemodynamic significance with fractional flow reserve testing.  He did have a mean gradient of 29 mmHg across the aortic valve which was difficult to obtain adequate values for in view of the atrial fibrillation suggesting moderate aortic stenosis. He subsequently had an echocardiogram the same day which showed a mean gradient of 21 mmHg across the valve again suggesting mild to moderate aortic stenosis.    
When I saw him last on March 14, 2018 he related that his chest tightness with exertion have been getting more severe over the past couple of months. [facility] general internist Dr. Reyes Miller had taken him off of Lopressor and given him some Pro-Air to try for his shortness of breath, but when I saw him his heart rate was 100 to 120 in atrial fibrillation and I started him on Toprol- mg daily and I also went ahead and did a pharmacologic myocardial perfusion study which was completed on March 16, 2018 and was read as an intermediate risk pharmacologic cardiac nuclear stress test primarily due to mildly depressed left ventricular systolic function with ejection fraction 45% in the setting of baseline atrial fibrillation with a controlled ventricular response, but there was no signs of focal ischemia or previous infarct on perfusion imaging. 
  
It should be noted that since his last visit he had an echocardiogram completed on June 7, 2018 which demonstrated completely normal left ventricular function with ejection fraction 55-60% in the setting of severe left atrial enlargement and signs of moderate aortic stenosis with a mean gradient of 25 mmHg across the valve. There really was not any significant change in this echo as compared to his echo in December 15, 2017. 
  
Due to persistent symptoms he had yet another cardiac catheterization completed on July 30, 2018 which demonstrated: 
  
1.  30-40% ostial left main trunk obstruction. 2.  50-60% mid LAD obstruction. 3.  40% mid nondominant circumflex obstruction. Chuckie Broach 4.  Mild diffuse disease throughout the dominant right coronary artery. 5.  Moderate aortic stenosis with mean gradient of 25 mmHg across the aortic valve. 6.  Normal left ventricular end-diastolic pressure. 7.  Normal left ventriculogram. 
  
Since there was no high-grade obstructions of either his coronary artery tree or significant aortic valve stenosis medical therapy was simply continued. 
  
He comes in today relates he is doing about the same. He still gets short of breath with any type of exertion. He has smoked half a pack to a pack of cigarettes a day for 55 years and certainly could have some underlying COPD, but but he relates that the pulmonary testing which has been done has not shown significant COPD according to the patient. He does have a component of aortic stenosis as indicated above but on examination I still do not think it is severe. Encounter Diagnoses Name Primary?  Coronary artery disease involving native coronary artery of native heart without angina pectoris Yes  Persistent atrial fibrillation (HCC)  Dyspnea on exertion, multifactorial   
 Aortic valve stenosis, moderate  Essential hypertension  Hyperlipidemia, unspecified hyperlipidemia type Discussion: This patient continues to have shortness of breath with any significant exertion and this may be potentially related to just inadequately controlled heart rate and atrial fibrillation at rest and with exertion, but other etiologies need to be considered since his shortness of breath seems to be out of proportion to his cardiac and pulmonary issues. I am going to get an echocardiogram on him again to reevaluate his aortic stenosis and rule out any pulmonary hypertension, but clinically I do not think his aortic stenosis has become severe. I did give him an [place] Sleepiness Scale to see if there is signs to suggest that he may have sleep apnea but he got only a 6 on that test so sleep apnea seems less likely.  
 
If his echocardiogram does not show any significant reason for his symptoms I am going to consider repeating his pulmonary function tests or discuss that with his pulmonologist since they were somewhat abnormal when done last year and I would also consider doing a Holter monitor study just to see if his exertional heart rate is very rapid which could potentially be the reason for his symptoms PCP: Andrea Dowling MD 
 
 
Past Medical History:  
Diagnosis Date  Atrial fibrillation ([facility] 1998  CAD (coronary artery disease)  Cardiac nuclear imaging test, normal 04/01/2016 Low risk. No ischemia or prior infarction. EF 52%. Neg EKG on pharm stress test.  
 Diabetes ([facility]  DM (diabetes mellitus) ([facility]  GERD (gastroesophageal reflux disease)  Hematuria 2001  HLD (hyperlipidemia)  HTN (hypertension)  HTN (hypertension)  Hypothyroid  Pancreatitis Past Surgical History:  
Procedure Laterality Date  CARDIAC CATHETERIZATION  10/17/2016  CORONARY ARTERY ANGIOGRAM  10/17/2016  FRACTIONAL FLOW RESERVE  10/17/2016  FRACTIONAL FLOW RESERVE ADDL  10/17/2016  HX CYST REMOVAL    
 x4  
 LV ANGIOGRAPHY  10/17/2016 Current Outpatient Medications Medication Sig  
 isosorbide mononitrate ER (IMDUR) 60 mg CR tablet TAKE 1 TABLET BY MOUTH  EVERY MORNING  
 metoprolol succinate (TOPROL-XL) 100 mg tablet TAKE 1 AND 1/2 TABLETS BY  MOUTH DAILY  furosemide (LASIX) 20 mg tablet TAKE 1 TABLET BY MOUTH  DAILY  nitroglycerin (NITROSTAT) 0.4 mg SL tablet 1 Tab by SubLINGual route every five (5) minutes as needed for Chest Pain.  metFORMIN ER (GLUCOPHAGE XR) 500 mg tablet 500 mg. Taking 2 tablets daily  aspirin delayed-release 81 mg tablet 1 tablet  warfarin (COUMADIN) 5 mg tablet Take 1 tablet by mouth once a day  levothyroxine (SYNTHROID) 150 mcg tablet Take 150 mcg by mouth daily.  umeclidinium-vilanterol (ANORO ELLIPTA) 62.5-25 mcg/actuation inhaler Take 1 Puff by inhalation daily.   
 albuterol (PROVENTIL HFA, VENTOLIN HFA, PROAIR HFA) 90 mcg/actuation inhaler Take 2 Puffs by inhalation every six (6) hours as needed for Wheezing.  MULTIVIT-MINERALS/FOLIC ACID (ONE DAILY GUMMY VITES PO) Take  by mouth daily.  pantoprazole (PROTONIX) 40 mg tablet Take 40 mg by mouth daily.  simvastatin (ZOCOR) 40 mg tablet Take 40 mg by mouth daily.  cholecalciferol, vitamin D3, 2,000 unit tab Take 1 Tab by mouth daily. No current facility-administered medications for this visit. No Known Allergies Social History : 
Social History Tobacco Use  Smoking status: Current Every Day Smoker Packs/day: 0.50 Years: 52.00 Pack years: 26.00  Smokeless tobacco: Never Used Substance Use Topics  Alcohol use: Yes Comment: every other day Family History: family history includes Diabetes in his father and mother; Stroke in his father. Review of Systems:   
Constitutional: Negative. Respiratory: Positive for cough and shortness of breath. Negative for hemoptysis and wheezing. Cardiovascular: Negative. Gastrointestinal: Negative. Musculoskeletal: Negative. Neurological: Negative for dizziness. Physical Exam:   
The patient is a cooperative, alert, well developed, well nourished 69 y.o. white  male with full white beard who is in no acute distress at the time of the examination. Visit Vitals /68 Pulse (!) 106 Ht 5' 9\" (1.753 m) Wt 111.6 kg (246 lb) SpO2 98% BMI 36.33 kg/m² HEENT: Conjuctiva white, mucosa moist, no pallor or cyanosis. Xanthelasmas on the medial aspect of both eyelids. NECK: Supple without masses, tenderness or thyromegaly. There was no jugular venous distention. Carotid are full bilaterally without bruits. CHEST: Symmetrical with good excursion. LUNGS: Clear to auscultation in all fields. HEART: The apex is not displaced. There were no lifts, thrills or heaves. There is a normal S1 and S2.   There is a grade 2/6 mid to late peaking systolic ejection murmur along left sternal border with radiation to the base without appreciable diastolic murmurs, rubs, clicks, or gallops auscultated. ABDOMEN: Soft without masses, tenderness or organomegaly. EXTREMITIES: Full peripheral pulses without peripheral edema. INTEGUMENT: Warm and dry NEUROLOGICAL: The patient is oriented x 3 with motor function grossly intact. Review of Data: See PMH and Cardiology and Imaging sections for cardiac testing Results for orders placed or performed in visit on 04/17/19 AMB POC EKG ROUTINE W/ 12 LEADS, INTER & REP     Status: None Narrative Atrial fibrillation with some increased ventricular response rate 100-110. Complete interventricular conduction delay of indeterminate variety. Left axis deviation. One PVC on the tracing. Compared to the EKG of September 5, 2018 the heart rate is somewhat faster being 90 at that time and the PVC is new. Christina Saldana D.O., F.A.C.C. [facility] [address] [place] [address] and [facility] [id number] [place] [id number] [address] [place]avan [phone] B  [phone] PLEASE NOTE:  This document has been produced using voice recognition software. Unrecognized errors in transcription may be present.

## 2019-04-24 ENCOUNTER — HOSPITAL ENCOUNTER (OUTPATIENT)
Dept: NON INVASIVE DIAGNOSTICS | Age: 74
Discharge: HOME | End: 2019-04-24
Attending: INTERNAL MEDICINE
Payer: MEDICARE

## 2019-04-24 VITALS
HEIGHT: 69 IN | SYSTOLIC BLOOD PRESSURE: 110 MMHG | BODY MASS INDEX: 36.43 KG/M2 | DIASTOLIC BLOOD PRESSURE: 68 MMHG | WEIGHT: 246 LBS

## 2019-04-24 DIAGNOSIS — R06.09 OTHER FORM OF DYSPNEA: ICD-10-CM

## 2019-04-24 LAB
ECHO AO ASC DIAM: 3.46 CM
ECHO AO ROOT DIAM: 3.61 CM
ECHO AV AREA PEAK VELOCITY: 0.9 CM2
ECHO AV AREA VTI: 0.8 CM2
ECHO AV AREA/BSA PEAK VELOCITY: 0.4 CM2/M2
ECHO AV AREA/BSA VTI: 0.3 CM2/M2
ECHO AV MEAN GRADIENT: 22.9 MMHG
ECHO AV PEAK GRADIENT: 38.6 MMHG
ECHO AV PEAK VELOCITY: 310.82 CM/S
ECHO AV VTI: 77.93 CM
ECHO LA AREA 4C: 30.4 CM2
ECHO LA VOL 2C: 56.8 ML (ref 18–58)
ECHO LA VOL 4C: 101.89 ML (ref 18–58)
ECHO LA VOL BP: 80.91 ML (ref 18–58)
ECHO LA VOL/BSA BIPLANE: 35.87 ML/M2 (ref 16–28)
ECHO LA VOLUME INDEX A2C: 25.18 ML/M2 (ref 16–28)
ECHO LA VOLUME INDEX A4C: 45.17 ML/M2 (ref 16–28)
ECHO LV INTERNAL DIMENSION DIASTOLIC: 3.68 CM (ref 4.2–5.9)
ECHO LV INTERNAL DIMENSION SYSTOLIC: 2.45 CM
ECHO LV IVSD: 1.74 CM (ref 0.6–1)
ECHO LV MASS 2D: 291.1 G (ref 88–224)
ECHO LV MASS INDEX 2D: 129.1 G/M2 (ref 49–115)
ECHO LV POSTERIOR WALL DIASTOLIC: 1.58 CM (ref 0.6–1)
ECHO LVOT DIAM: 2.2 CM
ECHO LVOT PEAK GRADIENT: 2.3 MMHG
ECHO LVOT PEAK VELOCITY: 75.89 CM/S
ECHO LVOT VTI: 16.39 CM
ECHO PULMONARY ARTERY SYSTOLIC PRESSURE (PASP): 17 MMHG
ECHO TV REGURGITANT MAX VELOCITY: 184.75 CM/S
ECHO TV REGURGITANT PEAK GRADIENT: 13.7 MMHG

## 2019-04-24 PROCEDURE — 74011250636 HC RX REV CODE- 250/636: Performed by: INTERNAL MEDICINE

## 2019-04-24 PROCEDURE — C8929 TTE W OR WO FOL WCON,DOPPLER: HCPCS

## 2019-04-24 RX ADMIN — PERFLUTREN 2 ML: 6.52 INJECTION, SUSPENSION INTRAVENOUS at 15:49

## 2019-04-25 NOTE — PROGRESS NOTES
Per  Your last office note: If his echocardiogram does not show any significant reason for his symptoms I am going to consider repeating his pulmonary function tests or discuss that with his pulmonologist since they were somewhat abnormal when done last year and I would also consider doing a Holter monitor study just to see if his exertional heart rate is very rapid which could potentially be the reason for his symptoms

## 2019-04-26 ENCOUNTER — TELEPHONE (OUTPATIENT)
Dept: CARDIOLOGY CLINIC | Age: 74
End: 2019-04-26

## 2019-04-26 DIAGNOSIS — I48.91 ATRIAL FIBRILLATION, UNSPECIFIED TYPE (HCC): ICD-10-CM

## 2019-04-26 DIAGNOSIS — R06.02 SOB (SHORTNESS OF BREATH): ICD-10-CM

## 2019-04-26 DIAGNOSIS — R07.89 CHEST PRESSURE: ICD-10-CM

## 2019-04-26 DIAGNOSIS — R06.09 OTHER FORM OF DYSPNEA: Primary | ICD-10-CM

## 2019-04-26 NOTE — TELEPHONE ENCOUNTER
I attempted to call him about his echo report and the line was busy. His echo shows normal LV function and only moderate aortic stenosis which doesn't look severe enough to be causing symptoms. I would do a Holter to check his overall rate in atrial fibrillation which if high might be giving him shortness of breath and I would recheck PFT's with CBC, ABG's, and DLCO's so we can see if his pulmonary issues are severe enough to give him a lot of shortness of breath. Please let him know.  ES

## 2019-04-26 NOTE — TELEPHONE ENCOUNTER
----- Message from India Ames RN sent at 4/25/2019 11:51 AM EDT ----- Per  Your last office note: If his echocardiogram does not show any significant reason for his symptoms I am going to consider repeating his pulmonary function tests or discuss that with his pulmonologist since they were somewhat abnormal when done last year and I would also consider doing a Holter monitor study just to see if his exertional heart rate is very rapid which could potentially be the reason for his symptoms

## 2019-04-30 NOTE — TELEPHONE ENCOUNTER
Patient informed of results; verbalized understanding. Verbal order and read back per Christopher Cisse DO Order placed for Holter monitor 48 hrs and PFT with CBC ABG DLCO's

## 2019-08-05 ENCOUNTER — HOSPITAL ENCOUNTER (OUTPATIENT)
Dept: LAB | Age: 74
Discharge: HOME | End: 2019-08-05
Payer: MEDICARE

## 2019-08-05 PROCEDURE — 88305 TISSUE EXAM BY PATHOLOGIST: CPT

## 2019-08-14 ENCOUNTER — NURSE NAVIGATOR (OUTPATIENT)
Dept: OTHER | Age: 74
End: 2019-08-14

## 2019-08-14 NOTE — NURSE NAVIGATOR
Referring Provider: Terrance Rogers MD      Lung Cancer Risk Profile:   Age: 69  Gender: Male  Height: 69\"  Weight: 246#    Smoking History:  Smoking Status: current use  # years smokin  # years quit: 0  Packs/day: 1  Pack years: 64    Patient discussed smoking cessation with PCP: Yes, per patient report    Patient participated in shared decision making process with PCP: Unknown    Patient is currently experiencing symptoms: No, per patient report    If yes what symptoms:     Co-Morbidities:  COPD/emphysema, CAD, CHF    Cancer History:  Squamous cell    Additional Risk Factors:   Exposure to second hand smoke      Patient's smoking history discussed via phone. Patient meets LDCT lung cancer screening criteria.  Call transferred to central scheduling to schedule exam.      [place] RN, [address] [address] [facility] Nurse Navigator

## 2019-08-21 ENCOUNTER — HOSPITAL ENCOUNTER (OUTPATIENT)
Dept: LAB | Age: 74
Discharge: HOME | End: 2019-08-21
Payer: MEDICARE

## 2019-08-21 LAB
ANION GAP SERPL CALC-SCNC: 8 MMOL/L (ref 3–18)
BUN SERPL-MCNC: 21 MG/DL (ref 7–18)
BUN/CREAT SERPL: 14 (ref 12–20)
CALCIUM SERPL-MCNC: 9.2 MG/DL (ref 8.5–10.1)
CHLORIDE SERPL-SCNC: 109 MMOL/L (ref 100–111)
CO2 SERPL-SCNC: 26 MMOL/L (ref 21–32)
CREAT SERPL-MCNC: 1.47 MG/DL (ref 0.6–1.3)
GLUCOSE SERPL-MCNC: 159 MG/DL (ref 74–99)
POTASSIUM SERPL-SCNC: 4.8 MMOL/L (ref 3.5–5.5)
SODIUM SERPL-SCNC: 143 MMOL/L (ref 136–145)

## 2019-08-21 PROCEDURE — 80048 BASIC METABOLIC PNL TOTAL CA: CPT

## 2019-08-21 PROCEDURE — 36415 COLL VENOUS BLD VENIPUNCTURE: CPT

## 2019-08-27 ENCOUNTER — HOSPITAL ENCOUNTER (OUTPATIENT)
Dept: LAB | Age: 74
Discharge: HOME | End: 2019-08-27
Payer: MEDICARE

## 2019-08-27 PROCEDURE — 88305 TISSUE EXAM BY PATHOLOGIST: CPT

## 2019-08-27 PROCEDURE — 88332 PATH CONSLTJ SURG EA ADD BLK: CPT

## 2019-08-27 PROCEDURE — 88331 PATH CONSLTJ SURG 1 BLK 1SPC: CPT

## 2019-10-16 ENCOUNTER — OFFICE VISIT (OUTPATIENT)
Dept: CARDIOLOGY CLINIC | Age: 74
End: 2019-10-16

## 2019-10-16 VITALS
HEIGHT: 69 IN | HEART RATE: 84 BPM | OXYGEN SATURATION: 98 % | WEIGHT: 241 LBS | SYSTOLIC BLOOD PRESSURE: 96 MMHG | BODY MASS INDEX: 35.7 KG/M2 | DIASTOLIC BLOOD PRESSURE: 69 MMHG

## 2019-10-16 DIAGNOSIS — I35.0 AORTIC VALVE STENOSIS, ETIOLOGY OF CARDIAC VALVE DISEASE UNSPECIFIED: ICD-10-CM

## 2019-10-16 DIAGNOSIS — R06.09 OTHER FORM OF DYSPNEA: ICD-10-CM

## 2019-10-16 DIAGNOSIS — E11.21 TYPE 2 DIABETES WITH NEPHROPATHY (HCC): ICD-10-CM

## 2019-10-16 DIAGNOSIS — E78.00 PURE HYPERCHOLESTEROLEMIA: ICD-10-CM

## 2019-10-16 DIAGNOSIS — I25.10 CORONARY ARTERY DISEASE INVOLVING NATIVE CORONARY ARTERY OF NATIVE HEART WITHOUT ANGINA PECTORIS: Primary | ICD-10-CM

## 2019-10-16 DIAGNOSIS — J44.9 CHRONIC OBSTRUCTIVE PULMONARY DISEASE, UNSPECIFIED COPD TYPE (HCC): ICD-10-CM

## 2019-10-16 DIAGNOSIS — I48.19 PERSISTENT ATRIAL FIBRILLATION (HCC): ICD-10-CM

## 2019-10-16 NOTE — PROGRESS NOTES
HPI:  I saw Verónica Elizalde in my office today in cardiovascular evaluation regarding his chronic coronary artery disease, atrial fibrillation, and aortic valvular disease to discuss his persistent shortness of breath with exertion and exertional anginal type chest discomfort. Mr. Estefani Bullock is a pleasant 74 y. o. white male who I had not seen for 15-20 years when he presented to the office on 3/21/16 with the above-captioned problems. I had originally seen him for Dr. Sofiya Simpson for new onset atrial fibrillation many years ago. I tried to do a cardioversion at one time and that was unsuccessful and we decided to control his heart rate with beta-blockers and anticoagulate him with Coumadin.      He has been followed more recently by Dr. Luis Noyola over the years, but began to have more shortness of breath and some chest tightness sounding anginal prompting him to come to see him on 3/21/16. Johnny Altamirano did a nuclear myocardial perfusion study at that time which was read as low risk showing no signs of ischemia and low normal ejection fraction 52%. I increased his Toprol-XL and added Imdur to his regimen and he really did reasonably well until he came into my office on October 7, 2016 with a history of increasing shortness of breath and chest tightness with activity which is quite restricting and even though his nuclear myocardial perfusion study 6 months ago was negative his symptoms were so concerning that I decided to refer him directly for cardiac catheterization.      The cardiac catheterization was completed by Dr. Boby Ji on October 17, 2016 with findings of only moderate two-vessel coronary artery disease with a 70% mid LAD obstruction and a 60% mid circumflex obstructions neither of which were shown to have any hemodynamic significance with fractional flow reserve testing.  He did have a mean gradient of 29 mmHg across the aortic valve which was difficult to obtain adequate values for in view of the atrial fibrillation suggesting moderate aortic stenosis. He subsequently had an echocardiogram the same day which showed a mean gradient of 21 mmHg across the valve again suggesting mild to moderate aortic stenosis.     When I saw him last on March 14, 2018 he related that his chest tightness with exertion have been getting more severe over the past couple of months. [facility] general internist Dr. Gee Byrne had taken him off of Lopressor and given him some Pro-Air to try for his shortness of breath, but when I saw him his heart rate was 100 to 120 in atrial fibrillation and I started him on Toprol- mg daily and I also went ahead and did a pharmacologic myocardial perfusion study which was completed on March 16, 2018 and was read as an intermediate risk pharmacologic cardiac nuclear stress test primarily due to mildly depressed left ventricular systolic function with ejection fraction 45% in the setting of baseline atrial fibrillation with a controlled ventricular response, but there was no signs of focal ischemia or previous infarct on perfusion imaging.     It should be noted that since his last visit he had an echocardiogram completed on June 7, 2018 which demonstrated completely normal left ventricular function with ejection fraction 55-60% in the setting of severe left atrial enlargement and signs of moderate aortic stenosis with a mean gradient of 25 mmHg across the valve. There really was not any significant change in this echo as compared to his echo in December 15, 2017.     Due to persistent symptoms he had yet another cardiac catheterization completed on July 30, 2018 which demonstrated:     1.  30-40% ostial left main trunk obstruction. 2.  50-60% mid LAD obstruction. 3.  40% mid nondominant circumflex obstruction. .  4.  Mild diffuse disease throughout the dominant right coronary artery. 5.  Moderate aortic stenosis with mean gradient of 25 mmHg across the aortic valve.   6.  Normal left ventricular end-diastolic pressure. 7.  Normal left ventriculogram.     Since there was no high-grade obstructions of either his coronary artery tree or significant aortic valve stenosis medical therapy was simply continued. We have been following him with serial echocardiograms and his echocardiogram completed on April 24, 2019 again demonstrated normal left ventricular systolic function and moderate aortic stenosis with a mean gradient of 23 mmHg across the valve. He comes in today relates he is doing somewhat better. His fatigue issues really got quite severe when his Toprol-XL was up to 100 mg mg twice per day, but when he was taken off of the Toprol his heart rate was too fast in the 110 range, but on Toprol- mg a day his heart rate is reasonably well controlled and he thinks that he is breathing is doing better. Unfortunately, continues to smoke about half pack cigarettes per day. He really denies any other cardiovascular complaints. Encounter Diagnoses   Name Primary?  Dyspnea on exertion, multifactorial     Aortic valve stenosis, moderate     Coronary artery disease involving native coronary artery of native heart without angina pectoris Yes    Chronic obstructive pulmonary disease, unspecified COPD type (HCC)     Persistent atrial fibrillation     Pure hypercholesterolemia     Type 2 diabetes with nephropathy ([facility] [address]        Discussion: This gentleman shortness of breath on exertion is multifactorial, I believe from a combination of chronic atrial fibrillation, moderate aortic stenosis, COPD, and moderate obesity. His heart rate now seems to be fairly well controlled in the 80's and I think he is fairly well compensated in the setting of the multiple issues contributing to his shortness of breath as described above. His aortic stenosis I do not feel is gotten any worse and in fact his heart murmur is fairly soft today, so I am not going to repeat an echocardiogram at this time.   I told him that if he develops any exertional chest tightness, worsening shortness of breath with exertion, or any exertional syncope or presyncope to let us know and I would do an echocardiogram. Otherwise, I am going to repeat his echocardiogram in April 2020. His latest lipid profile is not available to me at times this dictation and is actually going to be done in the near future so I will review that and make further recommendations but historically his cholesterol has done reasonably well on Zocor 40 mg daily.     PCP: Jacklyn Braden MD      Past Medical History:   Diagnosis Date    Atrial fibrillation ([facility] [address] 1998    CAD (coronary artery disease)     Cardiac nuclear imaging test, normal 04/01/2016    Low risk. No ischemia or prior infarction. EF 52%. Neg EKG on pharm stress test.    Diabetes ([facility] [address]     DM (diabetes mellitus) ([facility] [address] [facility]     GERD (gastroesophageal reflux disease)     Hematuria 2001    HLD (hyperlipidemia)     HTN (hypertension)     HTN (hypertension)     Hypothyroid     Pancreatitis        Past Surgical History:   Procedure Laterality Date    CARDIAC CATHETERIZATION  10/17/2016         CORONARY ARTERY ANGIOGRAM  10/17/2016         FRACTIONAL FLOW RESERVE  10/17/2016         FRACTIONAL FLOW RESERVE ADDL  10/17/2016         HX CYST REMOVAL      x4    LV ANGIOGRAPHY  10/17/2016            Current Outpatient Medications   Medication Sig    isosorbide mononitrate ER (IMDUR) 60 mg CR tablet TAKE 1 TABLET BY MOUTH  EVERY MORNING    metoprolol succinate (TOPROL-XL) 100 mg tablet TAKE 1 AND 1/2 TABLETS BY  MOUTH DAILY    furosemide (LASIX) 20 mg tablet TAKE 1 TABLET BY MOUTH  DAILY    nitroglycerin (NITROSTAT) 0.4 mg SL tablet 1 Tab by SubLINGual route every five (5) minutes as needed for Chest Pain.  metFORMIN ER (GLUCOPHAGE XR) 500 mg tablet 500 mg.  Taking 2 tablets daily    aspirin delayed-release 81 mg tablet 1 tablet    warfarin (COUMADIN) 5 mg tablet Take 1 tablet by mouth once a day    levothyroxine (SYNTHROID) 150 mcg tablet Take 150 mcg by mouth daily.  umeclidinium-vilanterol (ANORO ELLIPTA) 62.5-25 mcg/actuation inhaler Take 1 Puff by inhalation daily.  albuterol (PROVENTIL HFA, VENTOLIN HFA, PROAIR HFA) 90 mcg/actuation inhaler Take 2 Puffs by inhalation every six (6) hours as needed for Wheezing.  MULTIVIT-MINERALS/FOLIC ACID (ONE DAILY GUMMY VITES PO) Take  by mouth daily.  pantoprazole (PROTONIX) 40 mg tablet Take 40 mg by mouth daily.  simvastatin (ZOCOR) 40 mg tablet Take 40 mg by mouth daily.  cholecalciferol, vitamin D3, 2,000 unit tab Take 1 Tab by mouth daily. No current facility-administered medications for this visit. No Known Allergies    Social History :  Social History     Tobacco Use    Smoking status: Current Every Day Smoker     Packs/day: 0.50     Years: 52.00     Pack years: 26.00    Smokeless tobacco: Never Used   Substance Use Topics    Alcohol use: Yes     Comment: every other day        Family History: family history includes Diabetes in his father and mother; Stroke in his father. Review of Systems:    Constitutional: Negative. Respiratory: Positive for cough and shortness of breath. Negative for hemoptysis and wheezing. Cardiovascular: Negative. Gastrointestinal: Negative. Musculoskeletal: Negative. Neurological: Negative for dizziness. Physical Exam:    The patient is a cooperative, alert, well developed, well nourished 69 y.o. white  male with full white beard who is in no acute distress at the time of the examination. Visit Vitals  BP 96/69   Pulse 84   Ht 5' 9\" (1.753 m)   Wt 109.3 kg (241 lb)   SpO2 98%   BMI 35.59 kg/m²       HEENT: Conjuctiva white, mucosa moist, no pallor or cyanosis. Xanthelasmas on the medial aspect of both eyelids. NECK: Supple without masses, tenderness or thyromegaly. There was no jugular venous distention. Carotid are full bilaterally without bruits.   CHEST: Symmetrical with good excursion. LUNGS: Clear to auscultation in all fields. HEART: The apex is not displaced. There were no lifts, thrills or heaves. There is a normal S1 and S2. There is a grade 1-2/6 mid to late peaking systolic ejection murmur along left sternal border with radiation to the base without appreciable diastolic murmurs, rubs, clicks, or gallops auscultated. ABDOMEN: Soft without masses, tenderness or organomegaly. EXTREMITIES: Full peripheral pulses without peripheral edema. INTEGUMENT: Warm and dry   NEUROLOGICAL: The patient is oriented x 3 with motor function grossly intact. Review of Data: See PMH and Cardiology and Imaging sections for cardiac testing      Results for orders placed or performed in visit on 10/16/19   AMB POC EKG ROUTINE W/ 12 LEADS, INTER & REP     Status: None    Narrative    Atrial fibrillation rate in the mid 80s. Left axis deviation. Complete interventricular conduction defect of indeterminate variety. Compared to the EKG of April 17, 2019 the heart rate is now controlled being 100 210 at that time. Chloe Carter D.O., F.A.C.C. [facility]  [facility]  [address] [place] [address]. [address] [facility]  [phone]    PLEASE NOTE:  This document has been produced using voice recognition software. Unrecognized errors in transcription may be present.

## 2019-11-04 ENCOUNTER — TELEPHONE (OUTPATIENT)
Dept: CARDIOLOGY CLINIC | Age: 74
End: 2019-11-04

## 2019-11-04 NOTE — LETTER
11/4/2019 1:41 PM 
 
Mr. Sandy Cruz [address] [facility] [id number] Dear Mr. Clementine Lemus, We have been unable to reach you by phone to notify you of your test results. Please call our office at [phone] and ask to speak with my nurse in order to explain these results to you and advise you of any recommendations.  
 
 
 
Sincerely, 
 
 
 
Deidra Monaco, DO

## 2019-11-04 NOTE — TELEPHONE ENCOUNTER
Labs received from [facility] internal medicine and cholesterol reviewed by Dr. Kelsey Riddle. Verbal order and read back per Pollie Bumpers, DO If patient is willing to, Switch to [id number] and recheck lipids in 3 months. Phone number invalid unable to reach patient. Letter being sent.

## 2019-11-11 NOTE — TELEPHONE ENCOUNTER
Patient returned call verified name and , patient stated he will like for Dr. Desiree Barnett to track his cholesterol because he is the one who is following it.

## 2019-12-27 ENCOUNTER — NURSE NAVIGATOR (OUTPATIENT)
Dept: OTHER | Age: 74
End: 2019-12-27

## 2019-12-27 NOTE — NURSE NAVIGATOR
Referring Provider: Lazarus Other, MD      Lung Cancer Risk Profile:   Age: 69  Gender: Male  Height: 69\"  Weight:     Smoking History:  Smoking Status: current use  # years smokin  # years quit: 0  Packs/day: 1  Pack years: 62    Patient discussed smoking cessation with PCP: Unknown    Patient participated in shared decision making process with PCP: Unknown    Patient is currently experiencing symptoms: No, per patient report    If yes what symptoms:     Co-Morbidities:  COPD/Emphysema, CAD, CHF    Cancer History:      Additional Risk Factors:         Exposure to second hand smoke      Patient's smoking history discussed via phone. Patient meets LDCT lung cancer screening criteria.  Call transferred to central scheduling to schedule exam.      NAJMA FletcherN, RN, [address] [facility] Nurse Navigator

## 2020-01-09 ENCOUNTER — HOSPITAL ENCOUNTER (OUTPATIENT)
Dept: LAB | Age: 75
Discharge: HOME | End: 2020-01-09
Payer: MEDICARE

## 2020-01-09 PROCEDURE — 88305 TISSUE EXAM BY PATHOLOGIST: CPT

## 2020-01-10 ENCOUNTER — HOSPITAL ENCOUNTER (OUTPATIENT)
Dept: CT IMAGING | Age: 75
Discharge: HOME | End: 2020-01-10
Attending: INTERNAL MEDICINE
Payer: MEDICARE

## 2020-01-10 DIAGNOSIS — F17.219 NICOTINE DEPENDENCE, CIGARETTES, W UNSP DISORDERS: ICD-10-CM

## 2020-01-10 DIAGNOSIS — Z87.891 PERSONAL HISTORY OF NICOTINE DEPENDENCE: ICD-10-CM

## 2020-01-10 PROCEDURE — G0297 LDCT FOR LUNG CA SCREEN: HCPCS

## 2020-01-24 RX ORDER — METOPROLOL SUCCINATE 100 MG/1
TABLET, EXTENDED RELEASE ORAL
Qty: 135 TAB | Refills: 3 | Status: SHIPPED | OUTPATIENT
Start: 2020-01-24 | End: 2020-12-16

## 2020-02-27 ENCOUNTER — HOSPITAL ENCOUNTER (OUTPATIENT)
Dept: LAB | Age: 75
Discharge: HOME | End: 2020-02-27
Payer: MEDICARE

## 2020-02-27 ENCOUNTER — HOSPITAL ENCOUNTER (OUTPATIENT)
Dept: LAB | Age: 75
Discharge: HOME | End: 2020-02-27

## 2020-02-27 DIAGNOSIS — Z01.818 PRE-OP EVALUATION: ICD-10-CM

## 2020-02-27 LAB
ANION GAP SERPL CALC-SCNC: 7 MMOL/L (ref 3–18)
BUN SERPL-MCNC: 15 MG/DL (ref 7–18)
BUN/CREAT SERPL: 10 (ref 12–20)
CALCIUM SERPL-MCNC: 9 MG/DL (ref 8.5–10.1)
CHLORIDE SERPL-SCNC: 104 MMOL/L (ref 100–111)
CO2 SERPL-SCNC: 25 MMOL/L (ref 21–32)
CREAT SERPL-MCNC: 1.52 MG/DL (ref 0.6–1.3)
GLUCOSE SERPL-MCNC: 300 MG/DL (ref 74–99)
POTASSIUM SERPL-SCNC: 4.6 MMOL/L (ref 3.5–5.5)
SODIUM SERPL-SCNC: 136 MMOL/L (ref 136–145)

## 2020-02-27 PROCEDURE — 80048 BASIC METABOLIC PNL TOTAL CA: CPT

## 2020-02-27 PROCEDURE — 36415 COLL VENOUS BLD VENIPUNCTURE: CPT

## 2020-02-27 PROCEDURE — 93005 ELECTROCARDIOGRAM TRACING: CPT

## 2020-02-28 LAB
ATRIAL RATE: 105 BPM
CALCULATED R AXIS, ECG10: -49 DEGREES
CALCULATED T AXIS, ECG11: 0 DEGREES
DIAGNOSIS, 93000: NORMAL
Q-T INTERVAL, ECG07: 392 MS
QRS DURATION, ECG06: 124 MS
QTC CALCULATION (BEZET), ECG08: 484 MS
VENTRICULAR RATE, ECG03: 92 BPM

## 2020-03-02 ENCOUNTER — HOSPITAL ENCOUNTER (OUTPATIENT)
Dept: LAB | Age: 75
Discharge: HOME | End: 2020-03-02
Payer: MEDICARE

## 2020-03-02 PROCEDURE — 88304 TISSUE EXAM BY PATHOLOGIST: CPT

## 2020-03-02 PROCEDURE — 88305 TISSUE EXAM BY PATHOLOGIST: CPT

## 2020-03-02 PROCEDURE — 88331 PATH CONSLTJ SURG 1 BLK 1SPC: CPT

## 2020-03-18 RX ORDER — FUROSEMIDE 20 MG/1
TABLET ORAL
Qty: 90 TAB | Refills: 3 | Status: SHIPPED | OUTPATIENT
Start: 2020-03-18 | End: 2021-03-23

## 2020-03-18 RX ORDER — ISOSORBIDE MONONITRATE 60 MG/1
TABLET, EXTENDED RELEASE ORAL
Qty: 90 TAB | Refills: 3 | Status: SHIPPED | OUTPATIENT
Start: 2020-03-18 | End: 2021-03-01

## 2020-04-02 ENCOUNTER — TELEPHONE (OUTPATIENT)
Dept: CARDIOLOGY CLINIC | Age: 75
End: 2020-04-02

## 2020-06-02 ENCOUNTER — HOSPITAL ENCOUNTER (OUTPATIENT)
Dept: NON INVASIVE DIAGNOSTICS | Age: 75
Discharge: HOME | End: 2020-06-02
Attending: INTERNAL MEDICINE
Payer: MEDICARE

## 2020-06-02 VITALS
SYSTOLIC BLOOD PRESSURE: 96 MMHG | BODY MASS INDEX: 35.7 KG/M2 | HEIGHT: 69 IN | DIASTOLIC BLOOD PRESSURE: 69 MMHG | WEIGHT: 241 LBS

## 2020-06-02 DIAGNOSIS — R06.09 OTHER FORM OF DYSPNEA: ICD-10-CM

## 2020-06-02 DIAGNOSIS — I35.0 AORTIC VALVE STENOSIS, ETIOLOGY OF CARDIAC VALVE DISEASE UNSPECIFIED: ICD-10-CM

## 2020-06-02 LAB
AV VELOCITY RATIO: 0.16
AV VTI RATIO: 0.2
ECHO AO ROOT DIAM: 3.53 CM
ECHO AV AREA PEAK VELOCITY: 0.8 CM2
ECHO AV AREA VTI: 0.9 CM2
ECHO AV AREA/BSA PEAK VELOCITY: 0.4 CM2/M2
ECHO AV AREA/BSA VTI: 0.4 CM2/M2
ECHO AV MEAN GRADIENT: 24.5 MMHG
ECHO AV MEAN VELOCITY: 2.37 M/S
ECHO AV PEAK GRADIENT: 38.2 MMHG
ECHO AV PEAK VELOCITY: 309.1 CM/S
ECHO AV VTI: 67.03 CM
ECHO LV EDV TEICHHOLZ: 0.46 ML
ECHO LV ESV TEICHHOLZ: 0.2 ML
ECHO LV INTERNAL DIMENSION DIASTOLIC: 4.23 CM (ref 4.2–5.9)
ECHO LV INTERNAL DIMENSION SYSTOLIC: 2.98 CM
ECHO LV IVSD: 1.51 CM (ref 0.6–1)
ECHO LV MASS 2D: 294.4 G (ref 88–224)
ECHO LV MASS INDEX 2D: 131.7 G/M2 (ref 49–115)
ECHO LV POSTERIOR WALL DIASTOLIC: 1.45 CM (ref 0.6–1)
ECHO LVOT DIAM: 2.6 CM
ECHO LVOT PEAK GRADIENT: 1 MMHG
ECHO LVOT PEAK VELOCITY: 49.35 CM/S
ECHO LVOT VTI: 10.98 CM
LVFS 2D: 29.59 %
LVOT MG: 0.64 MMHG
LVOT MV: 0.38 CM/S
LVSV (TEICH): 19.72 ML

## 2020-06-02 PROCEDURE — 93306 TTE W/DOPPLER COMPLETE: CPT

## 2020-06-03 NOTE — PROGRESS NOTES
Per your last note \" His aortic stenosis I do not feel is gotten any worse and in fact his heart murmur is fairly soft today, so I am not going to repeat an echocardiogram at this time. I told him that if he develops any exertional chest tightness, worsening shortness of breath with exertion, or any exertional syncope or presyncope to let us know and I would do an echocardiogram. Otherwise, I am going to repeat his echocardiogram in April 2020.

## 2020-06-21 NOTE — PROGRESS NOTES
This patient continues to have moderate aortic stenosis which is essentially unchanged from the echo of April of 2019 so nothing to do at this time. Please let the patient know.  ES

## 2020-06-24 ENCOUNTER — TELEPHONE (OUTPATIENT)
Dept: CARDIOLOGY CLINIC | Age: 75
End: 2020-06-24

## 2020-06-24 NOTE — TELEPHONE ENCOUNTER
----- Message from Garrett Seay DO sent at 6/21/2020  2:41 PM EDT ----- This patient continues to have moderate aortic stenosis which is essentially unchanged from the echo of April of 2019 so nothing to do at this time. Please let the patient know.  ES

## 2020-08-11 ENCOUNTER — OFFICE VISIT (OUTPATIENT)
Dept: CARDIOLOGY CLINIC | Age: 75
End: 2020-08-11

## 2020-08-11 VITALS
SYSTOLIC BLOOD PRESSURE: 90 MMHG | OXYGEN SATURATION: 98 % | DIASTOLIC BLOOD PRESSURE: 72 MMHG | BODY MASS INDEX: 35.84 KG/M2 | HEIGHT: 69 IN | WEIGHT: 242 LBS | HEART RATE: 88 BPM

## 2020-08-11 DIAGNOSIS — E11.21 TYPE 2 DIABETES WITH NEPHROPATHY (HCC): ICD-10-CM

## 2020-08-11 DIAGNOSIS — E78.00 PURE HYPERCHOLESTEROLEMIA: ICD-10-CM

## 2020-08-11 DIAGNOSIS — I25.10 CORONARY ARTERY DISEASE INVOLVING NATIVE CORONARY ARTERY OF NATIVE HEART WITHOUT ANGINA PECTORIS: Primary | ICD-10-CM

## 2020-08-11 DIAGNOSIS — I10 ESSENTIAL HYPERTENSION: ICD-10-CM

## 2020-08-11 DIAGNOSIS — J44.9 CHRONIC OBSTRUCTIVE PULMONARY DISEASE, UNSPECIFIED COPD TYPE (HCC): ICD-10-CM

## 2020-08-11 DIAGNOSIS — I48.19 PERSISTENT ATRIAL FIBRILLATION (HCC): ICD-10-CM

## 2020-08-11 DIAGNOSIS — I35.0 AORTIC VALVE STENOSIS, ETIOLOGY OF CARDIAC VALVE DISEASE UNSPECIFIED: ICD-10-CM

## 2020-08-11 NOTE — PROGRESS NOTES
Zita Montes De Oca presents today for   Chief Complaint   Patient presents with    Coronary Artery Disease     OVERDUE 6 MONTH     Shortness of [address] preferred language for health care discussion is english/other. Is someone accompanying this pt? no    Is the patient using any DME equipment during [address]? no    Depression Screening:  3 most recent PHQ Screens 10/16/2019   Little interest or pleasure in doing things Not at all   Feeling down, depressed, irritable, or hopeless Not at all   Total Score PHQ 2 0       Learning Assessment:  Learning Assessment 3/14/2018   PRIMARY LEARNER Patient   HIGHEST LEVEL OF EDUCATION - PRIMARY LEARNER  GRADUATED HIGH SCHOOL OR GED   BARRIERS PRIMARY LEARNER NONE   CO-LEARNER CAREGIVER No   PRIMARY LANGUAGE ENGLISH   LEARNER PREFERENCE PRIMARY READING     -   ANSWERED BY Patient   RELATIONSHIP SELF       Abuse Screening:  No flowsheet data found. Fall Risk  Fall Risk Assessment, last 12 mths 10/16/2019   Able to walk? Yes   Fall in past 12 months? No       Pt currently taking Anticoagulant therapy? yes    Coordination of Care:  1. Have you been to the ER, urgent care clinic since your last visit? Hospitalized since your last visit? no    2. Have you seen or consulted any other health care providers outside of the [address] [place] [address] since your last visit? Include any pap smears or colon screening.  no

## 2020-08-11 NOTE — PROGRESS NOTES
HPI:  I saw Emmett Mcfarlane in my office today in cardiovascular evaluation regarding his chronic coronary artery disease, atrial fibrillation, and aortic valvular disease. Mr. Annie Abarca is a pleasant 75 y. o. white male who I had not seen for 15-20 years when he presented to the office on 3/21/16 with the above-captioned problems. I had originally seen him for Dr. Aurelio Chambers for new onset atrial fibrillation many years ago. I tried to do a cardioversion at one time and that was unsuccessful and we decided to control his heart rate with beta-blockers and anticoagulate him with Coumadin.      He has been followed more recently by Dr. Desiree Barnett over the years, but began to have more shortness of breath and some chest tightness sounding anginal prompting him to come to see him on 3/21/16. Leo Wyatt did a nuclear myocardial perfusion study at that time which was read as low risk showing no signs of ischemia and low normal ejection fraction 52%. I increased his Toprol-XL and added Imdur to his regimen and he really did reasonably well until he came into my office on October 7, 2016 with a history of increasing shortness of breath and chest tightness with activity which is quite restricting and even though his nuclear myocardial perfusion study 6 months ago was negative his symptoms were so concerning that I decided to refer him directly for cardiac catheterization.      The cardiac catheterization was completed by Dr. Karine Vázquez on October 17, 2016 with findings of only moderate two-vessel coronary artery disease with a 70% mid LAD obstruction and a 60% mid circumflex obstructions neither of which were shown to have any hemodynamic significance with fractional flow reserve testing. He did have a mean gradient of 29 mmHg across the aortic valve which was difficult to obtain adequate values for in view of the atrial fibrillation suggesting moderate aortic stenosis.  He subsequently had an echocardiogram the same day which showed a mean gradient of 21 mmHg across the valve again suggesting mild to moderate aortic stenosis.     When I saw him last on March 14, 2018 he related that his chest tightness with exertion have been getting more severe over the past couple of months. [facility] general internist Dr. Gee Byrne had taken him off of Lopressor and given him some Pro-Air to try for his shortness of breath, but when I saw him his heart rate was 100 to 120 in atrial fibrillation and I started him on Toprol- mg daily and I also went ahead and did a pharmacologic myocardial perfusion study which was completed on March 16, 2018 and was read as an intermediate risk pharmacologic cardiac nuclear stress test primarily due to mildly depressed left ventricular systolic function with ejection fraction 45% in the setting of baseline atrial fibrillation with a controlled ventricular response, but there was no signs of focal ischemia or previous infarct on perfusion imaging medical therapy was continued. Samantha Som  Due to persistent symptoms he had yet another cardiac catheterization completed on July 30, 2018 which demonstrated:     1.  30-40% ostial left main trunk obstruction. 2.  50-60% mid LAD obstruction. 3.  40% mid nondominant circumflex obstruction. .  4.  Mild diffuse disease throughout the dominant right coronary artery. 5.  Moderate aortic stenosis with mean gradient of 25 mmHg across the aortic valve. 6.  Normal left ventricular end-diastolic pressure. 7.  Normal left ventriculogram.     Since there was no high-grade obstructions of either his coronary artery tree or significant aortic valve stenosis medical therapy was simply continued. He did have an echocardiogram last completed on June 2, 2020 which was technically difficult but did demonstrate an ejection fraction of 55 to 60% range and a mean gradient across the aortic valve 25 mmHg again suggesting moderate aortic stenosis. He comes in today relates he is doing fairly well.   He does have some shortness of breath issues which he feels is pulmonary in origin he does have a chronic cough is nonproductive but really denies any other pulmonary or cardiac issues though he does sleep with his head up to some degree for comfort. Encounter Diagnoses   Name Primary?  Coronary artery disease involving native coronary artery of native heart without angina pectoris Yes    Aortic valve stenosis, moderate     Chronic obstructive pulmonary disease, unspecified COPD type (HCC)     Persistent atrial fibrillation (HCC)     Pure hypercholesterolemia     Type 2 diabetes with nephropathy ([address]     Essential hypertension        Discussion: This gentleman shortness of breath on exertion is multifactorial the setting of moderate aortic stenosis, COPD and some deconditioning COVID-19 pandemic. I should note that he had been following with Dr. Yamini Sky for his pulmonary issues and Dr. Srini Early has moved to [place], [place] so I am going to refer him to the [facility] group for long-term follow-up. His aortic stenosis I do not feel has gotten any worse likely or with his recent echo cardiogram findings. I told him that if he develops any exertional chest tightness, worsening shortness of breath with exertion, or any exertional syncope or presyncope to let us know and I would do an echocardiogram. Otherwise, I am going to repeat his echocardiogram in 2021. His latest lipid profile is not available to me at times this dictation and is actually going to be done in the near future so I will review that and make further recommendations but historically his cholesterol has done reasonably well on Zocor 40 mg daily.     He otherwise seems to be doing well with well-controlled blood pressure and well-controlled heart rate and atrial fibrillation in the mid 80s some simply can plan to see him again in several months or he will follow-up with Dr. Kelly Mccullough if I have retired by that time.     PCP: Apollo Robles, Yulia Mireles MD      Past Medical History:   Diagnosis Date    Atrial fibrillation ([facility] 1998    CAD (coronary artery disease)     Cardiac nuclear imaging test, normal 04/01/2016    Low risk. No ischemia or prior infarction. EF 52%. Neg EKG on pharm stress test.    Diabetes [facility]     DM (diabetes mellitus) ([facility]     GERD (gastroesophageal reflux disease)     Hematuria 2001    HLD (hyperlipidemia)     HTN (hypertension)     HTN (hypertension)     Hypothyroid     Pancreatitis        Past Surgical History:   Procedure Laterality Date    CARDIAC CATHETERIZATION  10/17/2016         CORONARY ARTERY ANGIOGRAM  10/17/2016         FRACTIONAL FLOW RESERVE  10/17/2016         FRACTIONAL FLOW RESERVE ADDL  10/17/2016         HX CYST REMOVAL      x4    LV ANGIOGRAPHY  10/17/2016            Current Outpatient Medications   Medication Sig    furosemide (LASIX) 20 mg tablet TAKE 1 TABLET BY MOUTH  DAILY    isosorbide mononitrate ER (IMDUR) 60 mg CR tablet TAKE 1 TABLET BY MOUTH  EVERY MORNING    metoprolol succinate (TOPROL-XL) 100 mg tablet TAKE 1 AND 1/2 TABLETS BY  MOUTH DAILY (Patient taking differently: 100 mg.)    nitroglycerin (NITROSTAT) 0.4 mg SL tablet 1 Tab by SubLINGual route every five (5) minutes as needed for Chest Pain.  metFORMIN ER (GLUCOPHAGE XR) 500 mg tablet 500 mg. Taking 2 tablets daily    aspirin delayed-release 81 mg tablet 1 tablet    warfarin (COUMADIN) 5 mg tablet Take 1 tablet by mouth once a day    levothyroxine (SYNTHROID) 150 mcg tablet Take 150 mcg by mouth daily.  umeclidinium-vilanterol (ANORO ELLIPTA) 62.5-25 mcg/actuation inhaler Take 1 Puff by inhalation daily.  albuterol (PROVENTIL HFA, VENTOLIN HFA, PROAIR HFA) 90 mcg/actuation inhaler Take 2 Puffs by inhalation every six (6) hours as needed for Wheezing.  MULTIVIT-MINERALS/FOLIC ACID (ONE DAILY GUMMY VITES PO) Take  by mouth daily.  pantoprazole (PROTONIX) 40 mg tablet Take 40 mg by mouth daily.     simvastatin (ZOCOR) 40 mg tablet Take 40 mg by mouth daily.  cholecalciferol, vitamin D3, 2,000 unit tab Take 1 Tab by mouth daily. No current facility-administered medications for this visit. No Known Allergies    Social History :  Social History     Tobacco Use    Smoking status: Current Every Day Smoker     Packs/day: 0.50     Years: 52.00     Pack years: 26.00    Smokeless tobacco: Never Used   Substance Use Topics    Alcohol use: Yes     Comment: every other day        Family History: family history includes Diabetes in his father and mother; Stroke in his father. Review of Systems:    Constitutional: Negative. Respiratory: Positive for cough and shortness of breath. Negative for hemoptysis and wheezing. Cardiovascular: Negative. Gastrointestinal: Negative. Musculoskeletal: Negative. Neurological: Negative for dizziness. Physical Exam:    The patient is a cooperative, alert, well developed, well nourished 70 y.o. white  male with full white beard who is in no acute distress at the time of the examination. Visit Vitals  BP 90/72   Pulse 88   Ht 5' 9\" (1.753 m)   Wt 242 lb (109.8 kg)   SpO2 98%   BMI 35.74 kg/m²       HEENT: Conjuctiva white, mucosa moist, no pallor or cyanosis. Xanthelasmas on the medial aspect of both eyelids. NECK: Supple without masses, tenderness or thyromegaly. There was no jugular venous distention. Carotid are full bilaterally without bruits. CHEST: Symmetrical with good excursion. LUNGS: Clear to auscultation in all fields. HEART: The apex is not displaced. There were no lifts, thrills or heaves. There is a normal S1 and S2. There is a grade 1-2/6 mid to late peaking systolic ejection murmur along left sternal border with radiation to the base without appreciable diastolic murmurs, rubs, clicks, or gallops auscultated. ABDOMEN: Soft without masses, tenderness or organomegaly.   EXTREMITIES: Full peripheral pulses without peripheral edema.  INTEGUMENT: Warm and dry   NEUROLOGICAL: The patient is oriented x 3 with motor function grossly intact. Review of Data: See PMH and Cardiology and Imaging sections for cardiac testing      Results for orders placed or performed in visit on 08/11/20   AMB POC EKG ROUTINE W/ 12 LEADS, INTER & REP     Status: None    Narrative    Atrial fibrillation with controlled ventricular response rate in the high 80s. Left anterior fascicular block. Left ventricular hypertrophy by limb lead voltage right ear with some widening of the QRS. Compared to the EKG of February 27, 2020 there was little interval change. Christian Canada D.O., F.A.C.C. [facility]  [facility] [place]  [place] [address]. [address] [facility]  [phone]    PLEASE NOTE:  This document has been produced using voice recognition software. Unrecognized errors in transcription may be present.

## 2020-08-17 DIAGNOSIS — J44.9 CHRONIC OBSTRUCTIVE PULMONARY DISEASE, UNSPECIFIED COPD TYPE (HCC): Primary | ICD-10-CM

## 2020-08-17 NOTE — PROGRESS NOTES
Verbal order and read back per Trinidad Ricardo, DO  -Pulmonary referral for history of COPD   [facility], [address] [place] [address]   Dr. Amaro Spindle: 9/16/2020 @ 1839   Patient instructed to arrive 20 minutes early for check-in   Instructed to bring list of medications, picture ID, insurance cards and wear a mask. No further questions or concerns.

## 2020-08-27 ENCOUNTER — DOCUMENTATION ONLY (OUTPATIENT)
Dept: CARDIOLOGY CLINIC | Age: 75
End: 2020-08-27

## 2020-08-27 NOTE — PROGRESS NOTES
Referral sent for pulmonary, Danny Phan. Office notes, x-rays,tests and labs faxed to their office and confirmation fax received.

## 2020-09-16 ENCOUNTER — OFFICE VISIT (OUTPATIENT)
Dept: PULMONOLOGY | Age: 75
End: 2020-09-16

## 2020-09-16 VITALS
BODY MASS INDEX: 36.08 KG/M2 | HEIGHT: 69 IN | RESPIRATION RATE: 20 BRPM | WEIGHT: 243.6 LBS | HEART RATE: 82 BPM | TEMPERATURE: 97.8 F | OXYGEN SATURATION: 96 % | DIASTOLIC BLOOD PRESSURE: 69 MMHG | SYSTOLIC BLOOD PRESSURE: 117 MMHG

## 2020-09-16 DIAGNOSIS — E66.9 OBESITY (BMI 30-39.9): ICD-10-CM

## 2020-09-16 DIAGNOSIS — R91.8 PULMONARY NODULES: ICD-10-CM

## 2020-09-16 DIAGNOSIS — F17.200 CURRENT SMOKER: ICD-10-CM

## 2020-09-16 DIAGNOSIS — J44.9 CHRONIC OBSTRUCTIVE PULMONARY DISEASE, UNSPECIFIED COPD TYPE (HCC): Primary | ICD-10-CM

## 2020-09-16 RX ORDER — FLUTICASONE FUROATE, UMECLIDINIUM BROMIDE AND VILANTEROL TRIFENATATE 100; 62.5; 25 UG/1; UG/1; UG/1
1 POWDER RESPIRATORY (INHALATION) DAILY
Qty: 2 INHALER | Refills: 0 | Status: SHIPPED | COMMUNITY
Start: 2020-09-16 | End: 2022-10-18

## 2020-09-16 NOTE — PROGRESS NOTES
HISTORY OF PRESENT ILLNESS  Madelin Castro is a 70 y.o. male referred to establish care for COPD. Pt has COPD and was followed by Emmy 5 x 5 years. He has been on Anoro with good response but still complains of frequent wheezing, cough with daily phlegm production, and shortness of breath. SOB is triggered by light yardwork and walking moderate distances on a flat surface. He avoids stairs due to fear of being SOB and getting stuck. SOB now interferes with pt's golf game, despite use of a golf cart. Pt was tried on Spiriva in the past which to him did not work as well as Anoro. Denies chest pain, fever, hemoptysis. Review of Systems   Constitutional: Negative for chills, diaphoresis, fever, malaise/fatigue and weight loss. HENT: Negative for congestion, ear discharge, ear pain, hearing loss, nosebleeds, sinus pain, sore throat and tinnitus. Eyes: Negative for blurred vision, double vision, photophobia, pain, discharge and redness. Respiratory: Positive for cough (daily), sputum production, shortness of breath and wheezing. Negative for hemoptysis and stridor. Cardiovascular: Negative for chest pain, palpitations, orthopnea, claudication, leg swelling and PND. Gastrointestinal: Negative for abdominal pain, blood in stool, constipation, diarrhea, heartburn, melena, nausea and vomiting. Genitourinary: Negative for dysuria, flank pain, frequency, hematuria and urgency. Musculoskeletal: Negative for back pain, falls, joint pain, myalgias and neck pain. Skin: Negative for itching and rash. Neurological: Negative for dizziness, tingling, tremors, sensory change, speech change, focal weakness, seizures, loss of consciousness, weakness and headaches. Endo/Heme/Allergies: Negative for environmental allergies and polydipsia. Does not bruise/bleed easily. Psychiatric/Behavioral: Negative for depression, hallucinations, memory loss, substance abuse and suicidal ideas.  The patient is not nervous/anxious and does not have insomnia. Past Medical History:   Diagnosis Date    [facility] 1998    CAD (coronary artery disease)     Cardiac nuclear imaging test, normal 04/01/2016    Low risk. No ischemia or prior infarction. EF 52%. Neg EKG on pharm stress test.    Diabetes [facility]     DM (diabetes mellitus) ([facility]     GERD (gastroesophageal reflux disease)     Hematuria 2001    HLD (hyperlipidemia)     HTN (hypertension)     HTN (hypertension)     Hypothyroid     Pancreatitis      Past Surgical History:   Procedure Laterality Date    CARDIAC CATHETERIZATION  10/17/2016         CORONARY ARTERY ANGIOGRAM  10/17/2016         FRACTIONAL FLOW RESERVE  10/17/2016         FRACTIONAL FLOW RESERVE ADDL  10/17/2016         HX CYST REMOVAL      x4    LV ANGIOGRAPHY  10/17/2016          Current Outpatient Medications on File Prior to Visit   Medication Sig Dispense Refill    furosemide (LASIX) 20 mg tablet TAKE 1 TABLET BY MOUTH  DAILY 90 Tab 3    isosorbide mononitrate ER (IMDUR) 60 mg CR tablet TAKE 1 TABLET BY MOUTH  EVERY MORNING 90 Tab 3    metoprolol succinate (TOPROL-XL) 100 mg tablet TAKE 1 AND 1/2 TABLETS BY  MOUTH DAILY (Patient taking differently: 100 mg.) 135 Tab 3    nitroglycerin (NITROSTAT) 0.4 mg SL tablet 1 Tab by SubLINGual route every five (5) minutes as needed for Chest Pain. 1 Bottle 3    metFORMIN ER (GLUCOPHAGE XR) 500 mg tablet 500 mg. Taking 2 tablets daily      aspirin delayed-release 81 mg tablet 1 tablet      warfarin (COUMADIN) 5 mg tablet Take 1 tablet by mouth once a day      levothyroxine (SYNTHROID) 150 mcg tablet Take 150 mcg by mouth daily.  umeclidinium-vilanterol (ANORO ELLIPTA) 62.5-25 mcg/actuation inhaler Take 1 Puff by inhalation daily.  albuterol (PROVENTIL HFA, VENTOLIN HFA, PROAIR HFA) 90 mcg/actuation inhaler Take 2 Puffs by inhalation every six (6) hours as needed for Wheezing.       MULTIVIT-MINERALS/FOLIC ACID (ONE DAILY GUMMY VITES PO) Take  by mouth daily.  pantoprazole (PROTONIX) 40 mg tablet Take 40 mg by mouth daily.  simvastatin (ZOCOR) 40 mg tablet Take 40 mg by mouth daily.  cholecalciferol, vitamin D3, 2,000 unit tab Take 1 Tab by mouth daily. No current facility-administered medications on file prior to visit.       No Known Allergies  Family History   Problem Relation Age of Onset    Diabetes Mother     Diabetes Father     Stroke Father      Social History     Socioeconomic History    Marital status:      Spouse name: Not on file    Number of children: Not on file    Years of education: Not on file    Highest education level: Not on file   Occupational History    Not on file   Social Needs    Financial resource strain: Not on file    Food insecurity     Worry: Not on file     Inability: Not on file    Transportation needs     Medical: Not on file     Non-medical: Not on file   Tobacco Use    Smoking status: Current Every Day Smoker     Packs/day: 1.50     Years: 55.00     Pack years: 82.50    Smokeless tobacco: Never Used    Tobacco comment: 10 cigarettes per day lately   Substance and Sexual Activity    Alcohol use: Yes     Comment: every other day    Drug use: No    Sexual activity: Not on file   Lifestyle    Physical activity     Days per week: Not on file     Minutes per session: Not on file    Stress: Not on file   Relationships    Social connections     Talks on phone: Not on file     Gets together: Not on file     Attends Restoration service: Not on file     Active member of club or organization: Not on file     Attends meetings of clubs or organizations: Not on file     Relationship status: Not on file    Intimate partner violence     Fear of current or ex partner: Not on file     Emotionally abused: Not on file     Physically abused: Not on file     Forced sexual activity: Not on file   Other Topics Concern    Not on file   Social History Narrative    ** Merged History Encounter **          Blood pressure 117/69, pulse 82, temperature 97.8 °F (36.6 °C), temperature source Oral, resp. rate 20, height 5' 9\" (1.753 m), weight 110.5 kg (243 lb 9.6 oz), SpO2 96 %. Physical Exam  Constitutional:       General: He is not in acute distress. Appearance: Normal appearance. He is not ill-appearing, toxic-appearing or diaphoretic. HENT:      Head: Normocephalic and atraumatic. Right Ear: External ear normal.      Left Ear: External ear normal.      Nose: Nose normal. No congestion or rhinorrhea. Mouth/Throat:      Mouth: Mucous membranes are moist.      Pharynx: Oropharynx is clear. No oropharyngeal exudate or posterior oropharyngeal erythema. Eyes:      General: No scleral icterus. Right eye: No discharge. Left eye: No discharge. Extraocular Movements: Extraocular movements intact. Conjunctiva/sclera: Conjunctivae normal.      Pupils: Pupils are equal, round, and reactive to light. Neck:      Musculoskeletal: No neck rigidity or muscular tenderness. Vascular: No carotid bruit. Cardiovascular:      Rate and Rhythm: Normal rate and regular rhythm. Pulses: Normal pulses. Heart sounds: Normal heart sounds. No murmur. No gallop. Pulmonary:      Effort: Pulmonary effort is normal. No respiratory distress. Breath sounds: No stridor. No rhonchi or rales. Wheezes: right upper lung fields. Comments: Distant breath sounds  Chest:      Chest wall: No tenderness. Abdominal:      General: There is no distension. Palpations: Abdomen is soft. There is no mass. Tenderness: There is no abdominal tenderness. There is no rebound. Musculoskeletal:         General: No swelling, tenderness, deformity or signs of injury. Right lower leg: No edema. Left lower leg: No edema. Lymphadenopathy:      Cervical: No cervical adenopathy. Skin:     General: Skin is warm and dry.       Coloration: Skin is not jaundiced or pale. Findings: No bruising, erythema or rash. Lesion: several excisional scars on scalp and upper extremities. Neurological:      General: No focal deficit present. Mental Status: He is alert and oriented to person, place, and time. Coordination: Coordination normal.   Psychiatric:         Mood and Affect: Mood normal.         Behavior: Behavior normal.         Thought Content: Thought content normal.         Judgment: Judgment normal.       CT Results (most recent):  Results from Hospital Encounter encounter on 01/10/20   CT LOW DOSE LUNG CANCER SCREENING    Narrative EXAM:  CT CHEST WITHOUT CONTRAST     INDICATION: Lung CT screening study requested as patient needs established age  criteria, smoking history requirements, or additional risk factor eligibility  requirementsCT screening study requested as patient meets established age  criteria, smoking history requirements, or additional risk factor at which  ability requirements (radon exposure, occupational exposure, appropriate cancer  history, family history of lung cancer, COPD, and pulmonary fibrosis) or lung CT  screening study pulmonary nodule followup per protocol. TECHNIQUE: Low-dose computed tomography acquisition performed according to the  national comprehensive cancer network guidelines space on body mass index. Axial  raw images obtained. Reconstruction on lung windows and soft tissue windows with  additional coronal and sagittally reformatted series. No intravenous contrast  administered for this exam.    All CT scans at this facility are performed using dose optimization technique as  appropriate to a performed exam, to include automated exposure control,  adjustment of the mA and/or kV according to patient size (including appropriate  matching first site-specific examinations), or use of iterative reconstruction  technique. COMPARISON: January 24, 2019. CONTRAST: None.     FINDINGS:    There is mild emphysema with central bronchial wall thickening. There are  aspirated secretions in the central airways. There is a granuloma in the left  lower lobe. There is granuloma in the left upper lobe. There is a 1 mm left  lower lobe nodule near the major fissure on image 43. There is a stable 3 mm  left upper lobe nodule on image 110. There is no new suspicious pulmonary  nodule. .     There is no pleural effusion or pneumothorax. The aorta has a normal contour with no evidence of aneurysm. The heart is  mildly enlarged. There are dense calcifications of the aortic valve leaflets  noted. There is coronary artery disease. No mediastinal or hilar or axillary  adenopathy is appreciated. The bones and soft tissues of the chest wall are  within normal limits. . The visualized portions of the upper abdominal organs are  normal.      Impression Impression:     Mild emphysema and chronic bronchitis. Tiny pulmonary nodules are stable and may be considered benign. No new pulmonary  nodule. Aspirated secretions in the central airways. Dense calcifications of aortic valve leaflets may be associated with aortic  stenosis. Lung-RADS Category:  Management recommendation:           ASSESSMENT and PLAN  Encounter Diagnoses   Name Primary?  Chronic obstructive pulmonary disease, unspecified COPD type ([facility] Yes    Current smoker     Obesity (BMI 30-39. 9)     Pulmonary nodules         Pt with progressive dyspnea likely related to COPD with features of Emphysema and chronic bronchitis. History is consistent with chronic bronchitis, as is note of secretions in the airway seen in LDCT. Start Trelegy for added anti- inflammatory effects, sample given along with device instruction. May also consider Daliresp and/or Azithromycin for suppression in the future. Continue Albuterol rescue  Obtain records from prior Pulmonologist.  Schedule baseline PFTs and 6 MWD.   Referral for pulmonary rehab after discussion of possible benefits of pulmonary rehab. Counseled on smoking harm and need for cessation, pt not ready to quit yet but will continue to taper down. Reviewed LDCT results.   Over 50% of this 45 minute visit was spent in face to face counseling

## 2020-09-16 NOTE — PROGRESS NOTES
Michell Faustin presents today for   Chief Complaint   Patient presents with    COPD     Pt states he has had Afib for 20yrs.  Shortness of Breath     w exertion.  Cough     Pt relates this to smokers cough. Is someone accompanying this pt? No    Is the patient using any DME equipment during OV? NO    -DME Company NA    Depression Screening:  3 most recent PHQ Screens 9/16/2020   Little interest or pleasure in doing things Not at all   Feeling down, depressed, irritable, or hopeless Not at all   Total Score PHQ 2 0       Learning Assessment:  Learning Assessment 3/14/2018   PRIMARY LEARNER Patient   HIGHEST LEVEL OF EDUCATION - PRIMARY LEARNER  GRADUATED HIGH SCHOOL OR GED   BARRIERS PRIMARY LEARNER NONE   CO-LEARNER CAREGIVER No   PRIMARY LANGUAGE ENGLISH   LEARNER PREFERENCE PRIMARY READING     -   ANSWERED BY Patient   RELATIONSHIP SELF       Abuse Screening:  No flowsheet data found. Fall Risk  Fall Risk Assessment, last 12 mths 9/16/2020   Able to walk? Yes   Fall in past 12 months? No         Coordination of Care:  1. Have you been to the ER, urgent care clinic since your last visit? Hospitalized since your last visit? No    2. Have you seen or consulted any other health care providers outside of the [address] [place] [address] since your last visit? Include any pap smears or colon screening.  No.

## 2020-09-16 NOTE — LETTER
9/16/2020 12:27 PM 
 
Patient:  Vanessa Elizabeth YOB: 1945 Date of Visit: 9/16/2020 Dear Theresa Light MD 
[address] [place] [address] VIA Facsimile: [phone]: Thank you for referring Mr. Mariella Palacio to me for evaluation/treatment. Attached is my note with the relevant portions of my assessment and plan of care. If you have questions, please do not hesitate to call me. I look forward to following Mr. Marline Shah along with you. Sincerely, Doris Donohue MD

## 2020-09-28 ENCOUNTER — HOSPITAL ENCOUNTER (OUTPATIENT)
Dept: LAB | Age: 75
Discharge: HOME | End: 2020-09-28
Payer: MEDICARE

## 2020-09-28 PROCEDURE — 87635 SARS-COV-2 COVID-19 AMP PRB: CPT

## 2020-09-29 LAB — SARS-COV-2, COV2NT: NOT DETECTED

## 2020-10-07 DIAGNOSIS — R91.8 PULMONARY NODULES: ICD-10-CM

## 2020-10-07 DIAGNOSIS — J44.9 CHRONIC OBSTRUCTIVE PULMONARY DISEASE, UNSPECIFIED COPD TYPE (HCC): Primary | ICD-10-CM

## 2020-10-07 NOTE — PROGRESS NOTES
Order placed for Full PFT, per Verbal Order from Dr. Safia Paulson on 10/7/2020. Last office visit: 9/16/2020  Follow up Visit: 10/09/2020    Provider is aware of last office visit and follow up. No further action requested from provider.

## 2020-10-08 ENCOUNTER — TELEPHONE (OUTPATIENT)
Dept: PULMONOLOGY | Age: 75
End: 2020-10-08

## 2020-10-08 ENCOUNTER — HOSPITAL ENCOUNTER (OUTPATIENT)
Dept: RESPIRATORY THERAPY | Age: 75
Discharge: HOME | End: 2020-10-08
Attending: INTERNAL MEDICINE
Payer: MEDICARE

## 2020-10-08 DIAGNOSIS — R91.8 PULMONARY NODULES: ICD-10-CM

## 2020-10-08 DIAGNOSIS — J44.9 CHRONIC OBSTRUCTIVE PULMONARY DISEASE, UNSPECIFIED COPD TYPE (HCC): ICD-10-CM

## 2020-10-08 PROCEDURE — 94726 PLETHYSMOGRAPHY LUNG VOLUMES: CPT | Performed by: INTERNAL MEDICINE

## 2020-10-08 PROCEDURE — 94729 DIFFUSING CAPACITY: CPT

## 2020-10-08 PROCEDURE — 94060 EVALUATION OF WHEEZING: CPT | Performed by: INTERNAL MEDICINE

## 2020-10-08 PROCEDURE — 94729 DIFFUSING CAPACITY: CPT | Performed by: INTERNAL MEDICINE

## 2020-10-08 PROCEDURE — 94726 PLETHYSMOGRAPHY LUNG VOLUMES: CPT

## 2020-10-08 PROCEDURE — 94060 EVALUATION OF WHEEZING: CPT

## 2020-10-08 NOTE — TELEPHONE ENCOUNTER
Pulmonary Rehab called patient and spoke to him about the program. He is very interested and is ready to begin program. I told patient that the program is currently at maximum capacity, but he will get a call to schedule as soon as there is an opening. Will follow up at that time. Thank you, Cong Saldivar

## 2020-10-09 ENCOUNTER — OFFICE VISIT (OUTPATIENT)
Dept: PULMONOLOGY | Age: 75
End: 2020-10-09
Payer: MEDICARE

## 2020-10-09 VITALS
TEMPERATURE: 98.8 F | HEIGHT: 69 IN | BODY MASS INDEX: 35.34 KG/M2 | SYSTOLIC BLOOD PRESSURE: 109 MMHG | RESPIRATION RATE: 16 BRPM | WEIGHT: 238.6 LBS | HEART RATE: 80 BPM | DIASTOLIC BLOOD PRESSURE: 72 MMHG | OXYGEN SATURATION: 98 %

## 2020-10-09 DIAGNOSIS — J44.9 COPD, MILD (HCC): Primary | ICD-10-CM

## 2020-10-09 DIAGNOSIS — Z87.891 HISTORY OF TOBACCO USE: ICD-10-CM

## 2020-10-09 DIAGNOSIS — R09.89 PULMONARY AIR TRAPPING: ICD-10-CM

## 2020-10-09 PROCEDURE — G8536 NO DOC ELDER MAL SCRN: HCPCS | Performed by: INTERNAL MEDICINE

## 2020-10-09 PROCEDURE — 94618 PULMONARY STRESS TESTING: CPT | Performed by: INTERNAL MEDICINE

## 2020-10-09 PROCEDURE — G8752 SYS BP LESS 140: HCPCS | Performed by: INTERNAL MEDICINE

## 2020-10-09 PROCEDURE — 1101F PT FALLS ASSESS-DOCD LE1/YR: CPT | Performed by: INTERNAL MEDICINE

## 2020-10-09 PROCEDURE — G8417 CALC BMI ABV UP PARAM F/U: HCPCS | Performed by: INTERNAL MEDICINE

## 2020-10-09 PROCEDURE — G8432 DEP SCR NOT DOC, RNG: HCPCS | Performed by: INTERNAL MEDICINE

## 2020-10-09 PROCEDURE — 3017F COLORECTAL CA SCREEN DOC REV: CPT | Performed by: INTERNAL MEDICINE

## 2020-10-09 PROCEDURE — G8427 DOCREV CUR MEDS BY ELIG CLIN: HCPCS | Performed by: INTERNAL MEDICINE

## 2020-10-09 PROCEDURE — G8754 DIAS BP LESS 90: HCPCS | Performed by: INTERNAL MEDICINE

## 2020-10-09 PROCEDURE — 99214 OFFICE O/P EST MOD 30 MIN: CPT | Performed by: INTERNAL MEDICINE

## 2020-10-09 RX ORDER — FLUTICASONE FUROATE, UMECLIDINIUM BROMIDE AND VILANTEROL TRIFENATATE 100; 62.5; 25 UG/1; UG/1; UG/1
1 POWDER RESPIRATORY (INHALATION) DAILY
Qty: 1 INHALER | Refills: 0 | Status: SHIPPED | COMMUNITY
Start: 2020-10-09 | End: 2021-02-08

## 2020-10-09 RX ORDER — GLIMEPIRIDE 4 MG/1
4 TABLET ORAL 2 TIMES DAILY
COMMUNITY
Start: 2020-07-26

## 2020-10-09 NOTE — PROGRESS NOTES
HISTORY OF PRESENT ILLNESS  Ana Pretty is a 70 y.o. male following up for COPD. Ptwith COPD and was followed by [facility]. x 5 years. pt complains of frequent SOB and cough with tenacious phlegm triggered by light yardwork and walking moderate distances on a flat surface. He was switched to Trelegy on his last visit and noticed an improvement in coughing out phlegm and in exercise tolerance. He still gets SOB with doing yardwork and has to stop frequently. PFT and 6 MWD were done. PFT showed predominantly small airways disease with air trapping. 6 MWD is WNL. Pt is awaiting a spot in pulmonary rehab. Denies chest pain, fever, hemoptysis. Denies any new respiratory symptoms. Review of Systems   Constitutional: Negative for chills, diaphoresis, fever, malaise/fatigue and weight loss. HENT: Negative for congestion, ear discharge, ear pain, hearing loss, nosebleeds, sinus pain, sore throat and tinnitus. Eyes: Negative for blurred vision, double vision, photophobia, pain, discharge and redness. Respiratory: Positive for cough (daily), sputum production, shortness of breath and wheezing. Negative for hemoptysis and stridor. Cardiovascular: Negative for chest pain, palpitations, orthopnea, claudication, leg swelling and PND. Gastrointestinal: Negative for abdominal pain, blood in stool, constipation, diarrhea, heartburn, melena, nausea and vomiting. Genitourinary: Negative for dysuria, flank pain, frequency, hematuria and urgency. Musculoskeletal: Negative for back pain, falls, joint pain, myalgias and neck pain. Skin: Negative for itching and rash. Neurological: Negative for dizziness, tingling, tremors, sensory change, speech change, focal weakness, seizures, loss of consciousness and weakness. Endo/Heme/Allergies: Negative for environmental allergies and polydipsia. Does not bruise/bleed easily.    Psychiatric/Behavioral: Negative for depression, hallucinations, memory loss, substance abuse and suicidal ideas. The patient is not nervous/anxious and does not have insomnia. Past Medical History:   Diagnosis Date    Atrial fibrillation ([facility] 1998    CAD (coronary artery disease)     Cardiac nuclear imaging test, normal 04/01/2016    Low risk. No ischemia or prior infarction. EF 52%. Neg EKG on pharm stress test.    Diabetes ([facility]     DM (diabetes mellitus) ([facility]     GERD (gastroesophageal reflux disease)     Hematuria 2001    HLD (hyperlipidemia)     HTN (hypertension)     HTN (hypertension)     Hypothyroid     Pancreatitis      Past Surgical History:   Procedure Laterality Date    CARDIAC CATHETERIZATION  10/17/2016         CORONARY ARTERY ANGIOGRAM  10/17/2016         FRACTIONAL FLOW RESERVE  10/17/2016         FRACTIONAL FLOW RESERVE ADDL  10/17/2016         HX CYST REMOVAL      x4    LV ANGIOGRAPHY  10/17/2016          Current Outpatient Medications on File Prior to Visit   Medication Sig Dispense Refill    glimepiride (AMARYL) 4 mg tablet       fluticasone-umeclidinium-vilanterol (Trelegy Ellipta) 100-62.5-25 mcg inhaler Take 1 Puff by inhalation daily. 2 Inhaler 0    furosemide (LASIX) 20 mg tablet TAKE 1 TABLET BY MOUTH  DAILY 90 Tab 3    isosorbide mononitrate ER (IMDUR) 60 mg CR tablet TAKE 1 TABLET BY MOUTH  EVERY MORNING 90 Tab 3    metoprolol succinate (TOPROL-XL) 100 mg tablet TAKE 1 AND 1/2 TABLETS BY  MOUTH DAILY (Patient taking differently: 100 mg.) 135 Tab 3    metFORMIN ER (GLUCOPHAGE XR) 500 mg tablet 500 mg. Taking 2 tablets daily      aspirin delayed-release 81 mg tablet 1 tablet      warfarin (COUMADIN) 5 mg tablet Take 1 tablet by mouth once a day      levothyroxine (SYNTHROID) 150 mcg tablet Take 150 mcg by mouth daily.  albuterol (PROVENTIL HFA, VENTOLIN HFA, PROAIR HFA) 90 mcg/actuation inhaler Take 2 Puffs by inhalation every six (6) hours as needed for Wheezing.       MULTIVIT-MINERALS/FOLIC ACID (ONE DAILY GUMMY VITES PO) Take  by mouth daily.  pantoprazole (PROTONIX) 40 mg tablet Take 40 mg by mouth daily.  simvastatin (ZOCOR) 40 mg tablet Take 40 mg by mouth daily.  cholecalciferol, vitamin D3, 2,000 unit tab Take 1 Tab by mouth daily.  nitroglycerin (NITROSTAT) 0.4 mg SL tablet 1 Tab by SubLINGual route every five (5) minutes as needed for Chest Pain. 1 Bottle 3    umeclidinium-vilanterol (ANORO ELLIPTA) 62.5-25 mcg/actuation inhaler Take 1 Puff by inhalation daily. No current facility-administered medications on file prior to visit.       No Known Allergies  Family History   Problem Relation Age of Onset    Diabetes Mother     Diabetes Father     Stroke Father      Social History     Socioeconomic History    Marital status:      Spouse name: Not on file    Number of children: Not on file    Years of education: Not on file    Highest education level: Not on file   Occupational History    Not on file   Social Needs    Financial resource strain: Not on file    Food insecurity     Worry: Not on file     Inability: Not on file    Transportation needs     Medical: Not on file     Non-medical: Not on file   Tobacco Use    Smoking status: Current Every Day Smoker     Packs/day: 1.50     Years: 55.00     Pack years: 82.50    Smokeless tobacco: Never Used    Tobacco comment: 10 cigarettes per day lately   Substance and Sexual Activity    Alcohol use: Yes     Comment: every other day    Drug use: No    Sexual activity: Not on file   Lifestyle    Physical activity     Days per week: Not on file     Minutes per session: Not on file    Stress: Not on file   Relationships    Social connections     Talks on phone: Not on file     Gets together: Not on file     Attends Anabaptist service: Not on file     Active member of club or organization: Not on file     Attends meetings of clubs or organizations: Not on file     Relationship status: Not on file    Intimate partner violence     Fear of current or ex partner: Not on file     Emotionally abused: Not on file     Physically abused: Not on file     Forced sexual activity: Not on file   Other Topics Concern    Not on file   Social History Narrative    ** Merged History Encounter **          Blood pressure 109/72, pulse 80, temperature 98.8 °F (37.1 °C), temperature source Oral, resp. rate 16, height 5' 9\" (1.753 m), weight 108.2 kg (238 lb 9.6 oz), SpO2 98 %. Physical Exam  Constitutional:       General: He is not in acute distress. Appearance: Normal appearance. He is not ill-appearing, toxic-appearing or diaphoretic. HENT:      Head: Normocephalic and atraumatic. Right Ear: External ear normal.      Left Ear: External ear normal.      Nose: Nose normal. No congestion or rhinorrhea. Mouth/Throat:      Mouth: Mucous membranes are moist.      Pharynx: Oropharynx is clear. No oropharyngeal exudate or posterior oropharyngeal erythema. Eyes:      General: No scleral icterus. Right eye: No discharge. Left eye: No discharge. Extraocular Movements: Extraocular movements intact. Conjunctiva/sclera: Conjunctivae normal.      Pupils: Pupils are equal, round, and reactive to light. Neck:      Musculoskeletal: No neck rigidity or muscular tenderness. Vascular: No carotid bruit. Cardiovascular:      Rate and Rhythm: Normal rate and regular rhythm. Pulses: Normal pulses. Heart sounds: Normal heart sounds. Murmur: 2/6 systolic R SB. No gallop. Pulmonary:      Effort: Pulmonary effort is normal. No respiratory distress. Breath sounds: No stridor. No rhonchi or rales. Wheezes: R lower lung field. Comments: Distant breath sounds  Chest:      Chest wall: No tenderness. Abdominal:      Palpations: Abdomen is soft. There is no mass. Tenderness: There is no abdominal tenderness. There is no rebound. Musculoskeletal:         General: No swelling, tenderness, deformity or signs of injury. Right lower leg: No edema. Left lower leg: No edema. Lymphadenopathy:      Cervical: No cervical adenopathy. Skin:     General: Skin is warm and dry. Coloration: Skin is not jaundiced or pale. Findings: No bruising, erythema or rash. Lesion: several excisional scars on scalp and upper extremities. Neurological:      General: No focal deficit present. Mental Status: He is alert and oriented to person, place, and time. Coordination: Coordination normal.   Psychiatric:         Mood and Affect: Mood normal.         Behavior: Behavior normal.         Thought Content: Thought content normal.         Judgment: Judgment normal.       CT Results (most recent):  Results from Hospital Encounter encounter on 01/10/20   CT LOW DOSE LUNG CANCER SCREENING    Narrative EXAM:  CT CHEST WITHOUT CONTRAST     INDICATION: Lung CT screening study requested as patient needs established age  criteria, smoking history requirements, or additional risk factor eligibility  requirementsCT screening study requested as patient meets established age  criteria, smoking history requirements, or additional risk factor at which  ability requirements (radon exposure, occupational exposure, appropriate cancer  history, family history of lung cancer, COPD, and pulmonary fibrosis) or lung CT  screening study pulmonary nodule followup per protocol. TECHNIQUE: Low-dose computed tomography acquisition performed according to the  national comprehensive cancer network guidelines space on body mass index. Axial  raw images obtained. Reconstruction on lung windows and soft tissue windows with  additional coronal and sagittally reformatted series.  No intravenous contrast  administered for this exam.    All CT scans at this facility are performed using dose optimization technique as  appropriate to a performed exam, to include automated exposure control,  adjustment of the mA and/or kV according to patient size (including appropriate  matching first site-specific examinations), or use of iterative reconstruction  technique. COMPARISON: January 24, 2019. CONTRAST: None. FINDINGS:    There is mild emphysema with central bronchial wall thickening. There are  aspirated secretions in the central airways. There is a granuloma in the left  lower lobe. There is granuloma in the left upper lobe. There is a 1 mm left  lower lobe nodule near the major fissure on image 43. There is a stable 3 mm  left upper lobe nodule on image 110. There is no new suspicious pulmonary  nodule. .     There is no pleural effusion or pneumothorax. The aorta has a normal contour with no evidence of aneurysm. The heart is  mildly enlarged. There are dense calcifications of the aortic valve leaflets  noted. There is coronary artery disease. No mediastinal or hilar or axillary  adenopathy is appreciated. The bones and soft tissues of the chest wall are  within normal limits. . The visualized portions of the upper abdominal organs are  normal.      Impression Impression:     Mild emphysema and chronic bronchitis. Tiny pulmonary nodules are stable and may be considered benign. No new pulmonary  nodule. Aspirated secretions in the central airways. Dense calcifications of aortic valve leaflets may be associated with aortic  stenosis. Lung-RADS Category:  Management recommendation:         6 MWD : normal distance, no exercise hypoxemia  PFT: mild obstruction FEV1 71%, air trapping, reduced DLCO    ASSESSMENT and PLAN  Encounter Diagnoses   Name Primary?  COPD, mild ([address] Yes    Pulmonary air trapping     History of tobacco use         Pt with progressive dyspnea likely related to COPD with air trapping on PFT's. Pt to continue Trelegy, sample provided. Instructed pt on pursed lip breathing. May also consider Daliresp and/or Azithromycin for suppression in the future. Continue Albuterol rescue  Reviewed PFT and 6 MWD results with pt.    Await admission to pulmonary rehab program.   Encouraged pt on efforts to cut down on smoking. Pt meets criteria for LD CT screening, reminded on annual screen for January. LDCT ordered for January, RTC after. Flu vaccine given outside.

## 2020-10-09 NOTE — PROGRESS NOTES
Luis Angel Jiménez presents today for   Chief Complaint   Patient presents with    Results      PFT 10/8/2020 @ [facility]        Is someone accompanying this pt? No    Is the patient using any DME equipment during OV? No    -DME Company None    Depression Screening:  3 most recent PHQ Screens 9/16/2020   Little interest or pleasure in doing things Not at all   Feeling down, depressed, irritable, or hopeless Not at all   Total Score PHQ 2 0       Learning Assessment:  Learning Assessment 3/14/2018   PRIMARY LEARNER Patient   HIGHEST LEVEL OF EDUCATION - PRIMARY LEARNER  GRADUATED HIGH SCHOOL OR GED   BARRIERS PRIMARY LEARNER NONE   CO-LEARNER CAREGIVER No   PRIMARY LANGUAGE ENGLISH   LEARNER PREFERENCE PRIMARY READING     -   ANSWERED BY Patient   RELATIONSHIP SELF       Abuse Screening:  No flowsheet data found. Fall Risk  Fall Risk Assessment, last 12 mths 9/16/2020   Able to walk? Yes   Fall in past 12 months? No         Coordination of Care:  1. Have you been to the ER, urgent care clinic since your last visit? Hospitalized since your last visit? No    2. Have you seen or consulted any other health care providers outside of the [address] [place] [address] since your last visit? Include any pap smears or colon screening.  No

## 2020-10-09 NOTE — PROGRESS NOTES
Order placed for 6 MWT, per Verbal Order from Dr. Lou Hair on 10/9/2020. Last office visit: 9/16/2020  Follow up Visit: 10/09/2020    Provider is aware of last office visit and follow up. No further action requested from provider.

## 2020-11-12 ENCOUNTER — TELEPHONE (OUTPATIENT)
Dept: PULMONOLOGY | Age: 75
End: 2020-11-12

## 2020-11-12 NOTE — TELEPHONE ENCOUNTER
Pulmonary Rehab called patient and left a message about scheduling. Additional attempts at contact will be made. Thank you, Zackary Bang

## 2020-11-17 ENCOUNTER — TELEPHONE (OUTPATIENT)
Dept: PULMONOLOGY | Age: 75
End: 2020-11-17

## 2020-11-17 NOTE — TELEPHONE ENCOUNTER
Zeferino MCGEE([phone]). Wants to talk to nurse regarding order for pulmonary rehab. There is an order in the system but scheduling told pt that he needed another order. Please check and call him back.

## 2020-11-17 NOTE — TELEPHONE ENCOUNTER
Per Teresa Galdamez with [facility] she called to set up and he has never called her back.  She does not need another referral.

## 2020-11-19 NOTE — PROGRESS NOTES
Naz Sheehan    Session Notes: pt reports having lower back aching when walking, pt's SOB decreased by half after doing the correct breathing technique      Visit # 1/36         Date: 11/20/20         Time in: 0800         Time out: 0900         Modality    SpO2 HR RPE RPD   []   6 Min walk test      # Mins:  6 98 82 14 8   []  Arm Ergometer      Huff:    # Mins:         []  NuStep: Intervals  Warm-up:  Intervals:  Cool-Down     Workload    # Mins:         []  Recumbent Bike     Level: RPM: # Mins:         []  Bodyweight Exercises:     # Mins:           []  Resistance bands     Color   Reps/Sets # Mins:         []  Free Weights       LBS:     Reps/Sets   #Mins:         One-one-One Activity: Pt was the only one being treated by RT during this time     Start:   Stop: #Mins:    60       Balance Work:  Step-ups:    Reps/Sets     #Mins         Stretching          Breathing Retraining            Recovery & Monitoring                 [facility]

## 2020-11-19 NOTE — PROGRESS NOTES
Neto Ranks 70y.o. year old male with Chronic obstructive pulmonary disease, unspecified [J44.9]        Social  History & Family Component    Living Situation:      LIVES: Wife, daughter and two  Does patient have family/friends able to provide support  [x]  Yes  []  No     What type of home does patient live in? [x] one level   []   one level with basement      []   2 story    # of stairs to enter home:   3  # of stairs in home:   0  []  other:    Does patient have a yard? []  No   [x] Yes    If yes, can patient care for yard? [x]  Yes [x]  No     pt is able to rack his yard but no      If no, does patient require assistance to care for yard? [x]  Yes   []  No cut it    Transportation Resources:  Can patient drive themselves? [x]  Yes   []   No   If no, who is principle ? Does patient need referral for community resources (i.e., [employer], etc.)      []  No  []  Yes, which option?  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  Education/Grade Level:     Occupational exposures: dirt, gas fumes from the dealership garage    custodial/disability date: Retired 2013 from [email or web address], now works there part time for the same [employer]    COPD Assessment Test (CAT) Score: 25  Date of Test: 11/19/2020  Impact Level: high    CAT measures COPD health status and quantifies the impact of COPD on patients life  and how this changes over time  Score and Impact Level: >30 = very high; > 20 = high; 10 - 20 = medium; <10 = low    PHQ-9 Score: 5  Date of Test: 11/19/2020    Scale includes 9 depression questions including 1 suicidal ideation and a 10th question asking about work, home, and socialization  Each item on the questionnaire is scored from 0-3 and this means that a person can score between 0 and 27 for depression.    Results5-9 minimal symptoms, 10-14 minor depression, 15-19 major depression, moderately severe, >20 major depression, severe.      Assessment:    Limited participation in family and community activities [] No [x] Yes      Limited coping strategies [x] No [] Yes      Inappropriate over dependence on family and friends [x] No [] Yes      Stressors [] No [x] Yes  If yes, please describe:     Able to relax or perform leisure activities [x] No [] Yes   Describe leisure activities:    golf    Able to participate in former interests/hobbies: [x] No [] Yes   Describe hobbies:    Golf, gardening    Able to do yard/household activities: [] No [x] Yes   Describe activities: rack the yard   What is most difficult household chore?: taking the trash out  What is easiest household chore?: washing dishes    Current mood assessed: [] No [x] Yes   Description of present mood: []  Worried  []   Sad []    []  Depressed []   Impatient  []   Frustrated  []   Anxious  []   Contented  [x]   Cheerful  []   Happy []   Other:    Needs referral to referring MD for follow up: [x] No [] Yes       Aspects of family & home situation that may impact progress in Pulmonary Rehabilitation:      Barrier(s) to learning:  none     [x]  Family & home situation will enhance Pulmonary Rehabilitation, patient is a good candidate     ______________________________________________________________________      PLAN:    See ITP for Plan and Interventions during the comprehensive pulmonary rehabilitation program

## 2020-11-19 NOTE — PROGRESS NOTES
COPD Assessment Tool (CAT)     0-5           I never cough/I cough all the time       Score: 2    I have no phlegm in my chest/ My chest is completely full of phlegm  Score:5      My chest does not feel tight at all/ My chest feels very tight   Score:4      When I walk up a hill or stairs I am not breathless/   When I walk up a hill or stairs I am very breathless    Score:5      I am not limited doing any activities at home/   I am very limited doing activities at home     Score:4      I am confident leaving my home despite my lung condition/  I am not at all confident leaving my home because of my lung condition Score:0      I sleep soundly/ I don't sleep because of my lung condition   Score:2      I have lots of energy/ I have no energy at all     Score: 3                   Total:25

## 2020-11-19 NOTE — PROGRESS NOTES
Patient name: Meredith Soriano : 1945         Goals Comments   1. Squatting to do garden work for up to 10-15 minutes at a time    [x] initial  [] met                  [] not met  [] progressing     2.  Play nine holes of golf   [x] initial  [] met                  [] not met  [] progressing    3  Walk 20 yards without becoming short of breath     [x] initial  [] met                  [] not met  [] progressing        Frequency / Duration: Patient to be seen 2 times per week for 18 weeks:          Kinjal Camara 2020 4:53 PM

## 2020-11-20 ENCOUNTER — HOSPITAL ENCOUNTER (OUTPATIENT)
Dept: PULMONOLOGY | Age: 75
Discharge: HOME | End: 2020-11-20
Payer: MEDICARE

## 2020-11-20 VITALS — BODY MASS INDEX: 34.26 KG/M2 | WEIGHT: 232 LBS

## 2020-11-20 NOTE — PROGRESS NOTES
Outpatient Behavioral Health Evaluation Order    Rebecca Bowman is a 70y.o. year old male with Chronic obstructive pulmonary disease, unspecified [J44.9]. He is enrolled in pulmonary rehabilitation and the treating clinician believes the patient is demonstrating signs of anxiety. Gordon Longo admitting PHQ-9 depression score was 5 which is considered mild. Please co-sign this note if you would like your patient to be evaluated by a LCSW in our clinic. Thank you.

## 2020-11-23 ENCOUNTER — HOSPITAL ENCOUNTER (OUTPATIENT)
Dept: PULMONOLOGY | Age: 75
Discharge: HOME | End: 2020-11-23
Payer: MEDICARE

## 2020-11-23 VITALS — WEIGHT: 239 LBS | BODY MASS INDEX: 35.29 KG/M2

## 2020-11-23 PROCEDURE — G0424 PULMONARY REHAB W EXER: HCPCS

## 2020-11-23 NOTE — PROGRESS NOTES
Marva Cabezas 70y.o. year old male with Chronic obstructive pulmonary disease, unspecified [J44.9]        Social  History & Family Component    Living Situation:      LIVES: Wife, daughter and two  Does patient have family/friends able to provide support  [x]  Yes  []  No     What type of home does patient live in? [x] one level   []   one level with basement      []   2 story    # of stairs to enter home:  3  # of stairs in home:   0  []  other:    Does patient have a yard? []  No   [x] Yes    If yes, can patient care for yard? [x]  Yes [x]  No     pt is able to rack his yard but no      If no, does patient require assistance to care for yard? [x]  Yes   []  No cut it    Transportation Resources:  Can patient drive themselves? [x]  Yes   []   No   If no, who is principle ? Does patient need referral for community resources (i.e., Augustin Emmcapri, etc.)      []  No  []  Yes, which option?  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  Education/Grade Level:     Occupational exposures: dirt, gas fumes from the dealership garage    correction/disability date: Retired 2013 from "[employer] now works there part time for the same [employer]    COPD Assessment Test (CAT) Score: 25  Date of Test: 11/20/2020  Impact Level: high    CAT measures COPD health status and quantifies the impact of COPD on patients life  and how this changes over time  Score and Impact Level: >30 = very high; > 20 = high; 10 - 20 = medium; <10 = low    PHQ-9 Score: 5  Date of Test: 11/20/2020    Scale includes 9 depression questions including 1 suicidal ideation and a 10th question asking about work, home, and socialization  Each item on the questionnaire is scored from 0-3 and this means that a person can score between 0 and 27 for depression.    Results5-9 minimal symptoms, 10-14 minor depression, 15-19 major depression, moderately severe, >20 major depression, severe.      Assessment:    Limited participation in family and community activities [] No [x] Yes      Limited coping strategies [x] No [] Yes      Inappropriate over dependence on family and friends [x] No [] Yes      Stressors [] No [x] Yes  If yes, please describe:     Able to relax or perform leisure activities [x] No [] Yes   Describe leisure activities:    golf    Able to participate in former interests/hobbies: [x] No [] Yes   Describe hobbies:    Golf, gardening    Able to do yard/household activities: [] No [x] Yes   Describe activities: rack the yard   What is most difficult household chore?: taking the trash out  What is easiest household chore?: washing dishes    Current mood assessed: [] No [x] Yes   Description of present mood: []  Worried  []   Sad []    []  Depressed []   Impatient  []   Frustrated  []   Anxious  []   Contented  [x]   Cheerful  []   Happy []   Other:    Needs referral to referring MD for follow up: [x] No [] Yes       Aspects of family & home situation that may impact progress in Pulmonary Rehabilitation:      Barrier(s) to learning:  none     [x]  Family & home situation will enhance Pulmonary Rehabilitation, patient is a good candidate     ______________________________________________________________________      PLAN:    See ITP for Plan and Interventions during the comprehensive pulmonary rehabilitation program

## 2020-11-23 NOTE — PROGRESS NOTES
Summary Report    Benny Heath   Pulmonary Tx Plan   Description: Male : 1945    Pulmonary ITP      [place] INTAKE from 2020 in [place]    Referral Date  20    Significant Cardiovascular History  Chronic atrial fibrillation    Co-morbidities  Diabetes    Stages of change   Maintenance    Oxygen Use  No    ITP Visit Type  Initial Assessment    1st Date of Exercise  20    ITP Next Review Date  12/10/20    Visit #/Total Visits      EF %  55 %    Pulmonary ITP  Exercise, Psychosocial, Tobacco, Nutrition, Education    Total Score  0    Stages of Change  Action    Test  Six minute walk test    Distance Walked in   ft    Distance Walked (ft)  1018.5 ft    Peak HR  81    Peak BP  139/83    Peak RPE  14    Peak Mets  2.45    O2 Saturation  97-98    Stops  0    SPO2 Range  97-98    Mode  Track, Treadmill, Bike, Stepper, Ergometer, Elliptical    Frequency per week  2    Duration per session  one hour    Intensity  METS        2-4    Progression  slow-moderate    O2 Sat (%)      O2 Flow Rate (L/min)  0 l/min    Symptoms with Exercise  Shortness of breath    Target Heart Rate  112-128    Resistance Training  Yes    Assisted Devices  None    Resting BP  139/83    Peak BP  139/83    Is BP WDL?   Yes    Type  none    Comments  pt does not have a home exercis regimen    Target Goal(s)  Individual exercise RX, BP < 140/90 or < 130/80, if DM or CKD, Aerobic activity 30 + minutes/day  5 days/week, Home activity    Stages of Change  Preparation    Interventions  PCP notified    Consults  PCP    Currently Taking Psychotropic Meds  No    Medication Changes  No    Education  Advanced directives, Coping techniques, Environmental triggers, Impact self care behaviors on health, Relaxation techniques, Signs/Symptoms of depression, Stress management, Support groups, Tobacco dangers, Traveling with lung disease    Target Goal(s)  Assess presence or absence of depression using a valid screening tool, Demonstrates appropriate interaction with others, Engages in self-care behaviors, Maximizes coping skills, Positive support group    Uses Stress Mgmt Techniques  Yes    Patient Stated Psychosocial Goals  pt states he has no stressors    Stages of Change  Action    Tobacco Use  Yes, has smoked for 57 years   # Cigarette Smoked/Day  10-12    Smokeless Tobacco Use  No    Resource Information Provided  Yes    Stages of Change  Preparation    Diabetes  Yes    HbA1C  8.3    HbA1C Date  04/29/19    BG at Home  No    Diabetes Med(s)  Metformin ER    Diabetes Med(s) Change  No    HDL  37    LDL  85    Triglycerides  176    Weight   105.2 kg (232 lb)    Height   5' 9\" (1.753 m)    BMI  34.33    Waist Circumference   48 inches    Alcohol  Weekly    Type  boubon, Vokha    Amount  1-2 cups    Rate Your Plate Total Score  33    Dietitian Consult  No    Nurse/Patient Discussion  Yes    Nutrition Class  No    Lipid Clinic Referral  No    Education  Signs/Symptoms Hypo/Hyperglycemia, DM & CAD relationship, Low fat & cholesterol diet, Low sodium diet, High fiber diet, Healthy eating, Nutrition and lung disease, Supplemental dietary needs    Learning Barrier  Ready to learn    Education Schedule Given  Yes    Education  Tobacco triggers, Cardiac A&P, Med Compliance, Pulmonary Medications, Breaking the Dyspnea Cycle, Pulmonary A&P, lung/gas exchange, Heart/Lung association, Activity of Daily Living, Nebulizer Use, Preventing Infection, Bronchial Hygiene/controlled cough, Signs/Symptoms Hypo/hyperglycemia, DM & lung disease    Target Goals  Correct demonstration of breathing techniques, Correct demonstration of MDI use and care, Complete cessation of tobacco cessation          MD Signature: _______________________________________________        Date/Time:  _______________________________        Pharmacist Signature:_________________________________________        Date/Time: _______________________________

## 2020-11-23 NOTE — PROGRESS NOTES
Katelynn Purvis    Session Notes:     Visit # 2/36         Date: 11/23/2020         Time in: 1000         Time out: 1100         Modality    SpO2 HR RPE RPD   []   Warm-up      # Mins:         [x]  Arm Ergometer      Huff:  25  # Mins:  71 13 13 15 5   [x]  NuStep: Intervals  Warm-up:4/5min  Intervals: 4-6 1:1  Cool-Down     Workload  4-6    # Mins:  10   98 86 14 5   []  Recumbent Bike     Level: RPM: # Mins:         []  Bodyweight Exercises:     # Mins:           []  Resistance bands     Color   Reps/Sets # Mins:         []  Free Weights       LBS:     Reps/Sets   #Mins:         One-one-One Activity: Pt was the only one being treated by RT during this time     Start:   Stop: #Mins:    60       Balance Work:  Step-ups:    Reps/Sets     #Mins         Education Spacer w/MDI  5       Stretching   10 98 84 13 3   Breathing Retraining   1100 10/2 10 98 86 13 3   Recovery & Monitoring   5 [facility]

## 2020-11-25 ENCOUNTER — HOSPITAL ENCOUNTER (OUTPATIENT)
Dept: PULMONOLOGY | Age: 75
Discharge: HOME | End: 2020-11-25
Payer: MEDICARE

## 2020-11-25 PROCEDURE — G0424 PULMONARY REHAB W EXER: HCPCS

## 2020-11-25 NOTE — PROGRESS NOTES
Williams Feliz    Session Notes:  Pt doesn't check his glucose daiy at home,  the MD is aware per the pt. Pt arrived short of breath but recover after doing pursed lip breathing, will re enforce this throughout his workout.      Visit # 3/36         Date: 11/25/2020         Time in: 1000         Time out: 1100         Modality    SpO2 % HR RPE RPD   []   Warm-up      # Mins:         [x]  Arm Ergometer      Huff:  15-25  # Mins:  10 97 66 15 5   [x]  NuStep: Intervals  Warm-up:4  Intervals:  Cool-Down     Workload  4  # Mins:  15 99 79 14 4   []  Recumbent Bike     Level: RPM: # Mins:         []  Bodyweight Exercises:     # Mins:           []  Resistance bands     Color   Reps/Sets # Mins:         []  Free Weights       LBS:     Reps/Sets   #Mins:         One-one-One Activity: Pt was the only one being treated by RT during this time     Start:   Stop: #Mins:    60       Balance Work:  Step-ups:    Reps/Sets     #Mins         Stretching   64 26 00 13 3   Breathing Retraining   1000 10/2 10 98 75 13 3   Recovery & Monitoring   [address] [facility] [address] [facility]

## 2020-11-30 ENCOUNTER — HOSPITAL ENCOUNTER (OUTPATIENT)
Dept: PULMONOLOGY | Age: 75
Discharge: HOME | End: 2020-11-30
Payer: MEDICARE

## 2020-11-30 VITALS — WEIGHT: 236 LBS | BODY MASS INDEX: 34.85 KG/M2

## 2020-11-30 PROCEDURE — G0424 PULMONARY REHAB W EXER: HCPCS

## 2020-11-30 NOTE — PROGRESS NOTES
Monica Lara    Session Notes:     Visit # 5/36         Date: 11/30/2020         Time in: 1000         Time out: 1100         Modality    SpO2 % HR RPE RPD   []   Warm-up      # Mins:         [x]  Arm Ergometer      Huff:    # Mins:  32 16 34 12 4   [x]  NuStep: Intervals  Warm-up: 4 5min  Intervals:  Cool-Down     Workload  5  # Mins:  20 98 70 11 3   []  Recumbent Bike     Level: RPM: # Mins:         []  Bodyweight Exercises:     # Mins:           []  Resistance bands     Color   Reps/Sets # Mins:         []  Free Weights       LBS:     Reps/Sets   #Mins:         One-one-One Activity: Pt was the only one being treated by RT during this time     Start:   Stop: #Mins:    60       Balance Work:  Step-ups:    Reps/Sets     #Mins         Stretching  3 sets 10 98 70 11 2   Breathing Retraining   1000 10/2 10 98 72 11 2   Recovery & Monitoring   5 [facility]

## 2020-12-02 ENCOUNTER — HOSPITAL ENCOUNTER (OUTPATIENT)
Dept: PULMONOLOGY | Age: 75
Discharge: HOME | End: 2020-12-02
Payer: MEDICARE

## 2020-12-02 VITALS — WEIGHT: 236 LBS | BODY MASS INDEX: 34.85 KG/M2

## 2020-12-02 PROCEDURE — G0424 PULMONARY REHAB W EXER: HCPCS

## 2020-12-02 NOTE — PROGRESS NOTES
Jackie Aus    Session Notes: Discussed smoking cessation. Left note for Dr. Shannan Flowers to f/u with pt. Pt. interested in gum or lozenges to aid.       Visit # 5/36         Date: 12/2/2020         Time in: 1005         Time out: 1100         Modality    SpO2 HR RPE RPD   []   Warm-up     # Mins:       []  Arm Ergometer      Huff:  # Mins:       []  NuStep: Intervals  Warm-up: 4 min @ 4  Intervals: 3min @ 6  Cool-Down     Workload  5-6  # Mins:  20 97 67 13 3   []  Recumbent Bike     Level: RPM: # Mins:       []  Bodyweight Exercises:     # Mins:       []  Resistance bands     Color Reps/Sets # Mins:       []  Free Weights       LBS:   Reps/Sets   #Mins:       One-one-One Activity: Pt was the only one being treated by RT during this time     Start:  1005 Stop:  1100 #Mins:  55       Balance Work:  Step-ups:  Reps/Sets   #Mins       Stretching Total Body 6/2 15 96 67 12 1   Breathing Retraining   1250 ml 10/2 10 97 66 12 1   Recovery & Monitoring   5              Yung Riley, RT Yung Riley, RT [employer], RT   [employer], RT [employer], RT

## 2020-12-02 NOTE — PROGRESS NOTES
Pulmonary Rehab. Mr. Saul Elizabeth was seen in our clinic today. RT/pt. discussed possibility of smoking cessation (pt. actively smoking approx. 10 cigarettes/day, as per pt.). Pt. stated that he would be willing to consider use of smoking cessation aids such as gum or lozenges. However; stated clearly that he has no interest in patch use, d/t the negative experiences others have had with patch use in the past.     Pt. appears to be willing to proceed with attempts at smoking cessation. Please f/u with pt. to assess if gum or lozenges would be appropriate for Mr. Collado's use. Thank you.

## 2020-12-07 ENCOUNTER — HOSPITAL ENCOUNTER (OUTPATIENT)
Dept: PULMONOLOGY | Age: 75
Discharge: HOME | End: 2020-12-07
Payer: MEDICARE

## 2020-12-07 PROCEDURE — G0424 PULMONARY REHAB W EXER: HCPCS

## 2020-12-07 NOTE — PROGRESS NOTES
Monica Lara    Session Notes:     Visit # 6/36         Date: 12/7/2020         Time in: 1000         Time out: 1100         Modality    SpO2 % HR RPE RPD   []   Warm-up      # Mins:         []  Arm Ergometer      Huff:    # Mins:         [x]  NuStep: Intervals  Warm-up: 4/5 min  Intervals:4/6 3:3  Cool-Down     Workload  4-6  # Mins:  55 41 31 74 7   []  Recumbent Bike     Level: RPM: # Mins:         [x]  Bodyweight Exercises:   Knee extensions 10/2 # Mins:  5   98 76 14 4   []  Resistance bands     Color   Reps/Sets # Mins:         [x]  Free Weights    curls   LBS:    4 Reps/Sets   #Mins:  5 98 74 14 3   One-one-One Activity: Pt was the only one being treated by RT during this time     Start:   Stop: #Mins:    60       Balance Work:  Step-ups:    Reps/Sets     #Mins         [id number]   2 19 14 13 3   Breathing Retraining   1125 10/2 10 96 84 13 4   Recovery & Monitoring   [address] [facility]

## 2020-12-09 ENCOUNTER — HOSPITAL ENCOUNTER (OUTPATIENT)
Dept: PULMONOLOGY | Age: 75
Discharge: HOME | End: 2020-12-09
Payer: MEDICARE

## 2020-12-09 VITALS — BODY MASS INDEX: 34.85 KG/M2 | WEIGHT: 236 LBS

## 2020-12-09 PROCEDURE — G0424 PULMONARY REHAB W EXER: HCPCS

## 2020-12-09 NOTE — PROGRESS NOTES
[place]ellacecyspencer [place]    Session Notes:     7/36         Date: 12/9/2020         Time in: 1000         Time out: 1100         Modality    SpO2 % HR RPE RPD   []   Warm-up      # Mins:         []  Arm Ergometer      Huff:    # Mins:         [x]  NuStep: Intervals  Warm-up:4/5min  Intervals:4-7  Cool-Down     Workload  4-7  # Mins:  20 97 94 15 5   []  Recumbent Bike     Level: RPM: # Mins:         []  Bodyweight Exercises:     # Mins:           [x]  Resistance bands     Color  red Reps/Sets  10 # Mins:  5 98 85 13 4   [x]  Free Weights       LBS:    4 Reps/Sets  10/2 #Mins:  5 98 84 12 2   One-one-One Activity: Pt was the only one being treated by RT during this time     Start:   Stop: #Mins:    60       Balance Work:  Step-ups:    Reps/Sets     #Mins         [id number]   1 62 43 12 2   Breathing Retraining   1000 10/2 10 97 81 12 2   Recovery & Monitoring   Jonas Chavez

## 2020-12-10 NOTE — PROGRESS NOTES
Summary Report Sommer Nunez Pulmonary Tx Plan Description: Male : 1945 Pulmonary ITP  
 
 DC PHASE II from 2020 in [place] Referral Date  20 Significant Cardiovascular History  Chronic atrial fibrillation Co-morbidities  Diabetes Stages of change   Maintenance Oxygen Use  No   
ITP Visit Type  Re-Assessment 1st Date of Exercise  20 ITP Next Review Date  20 Visit #/Total Visits   EF %  55 % Pulmonary ITP  Exercise, Psychosocial, Tobacco, Nutrition, Education Stages of Change  Action Test  Six minute walk test   
Distance Walked in   ft Distance Walked (ft)  1018.5 ft   
Peak HR  81 Peak BP  139/83 Peak RPE  14 Peak Mets  2.45   
O2 Saturation  97-98 Stops  0   
SPO2 Range  97-98 Mode  Track, Treadmill, Bike, Stepper, Ergometer, Elliptical   
Frequency per week  2 Duration per session  one hour Intensity  METS        2-4 Progression  slow-moderate O2 Flow Rate (L/min)  0 l/min Symptoms with Exercise  Shortness of breath Target Heart Rate  112-128 Resistance Training  Yes Assisted Devices  None Resting BP  120/66 Peak BP  137/65 Is BP WDL? Yes Type  none Comments  resistance band, stretching Target Goal(s)  Individual exercise RX, BP < 140/90 or < 130/80, if DM or CKD, Aerobic activity 30 + minutes/day  5 days/week, Home activity Stages of Change  Preparation Consults  PCP Currently Taking Psychotropic Meds  No   
Medication Changes  Yes Education  Advanced directives, Coping techniques, Environmental triggers, Impact self care behaviors on health, Relaxation techniques, Signs/Symptoms of depression, Stress management, Support groups, Tobacco dangers, Traveling with lung disease Target Goal(s)  Assess presence or absence of depression using a valid screening tool, Demonstrates appropriate interaction with others, Engages in self-care behaviors, Maximizes coping skills, Positive support group Uses Stress Mgmt Techniques  Yes Patient Stated Psychosocial Goals  pt states he has no stressors Stages of Change  Action Tobacco Use  Yes # Cigarette Smoked/Day  10-12 Smokeless Tobacco Use  No   
Smoking Cessation Referral  Yes Resource Information Provided  Yes Target Goal  Complete cessation of tobacco use Patient Stated Tobacco Goals  pt is willing to take gum or lozenges Stages of Change  Preparation Diabetes  Yes HbA1C  8.3 HbA1C Date  04/29/19 BG at Home  No   
Diabetes Med(s)  Metformin ER Diabetes Med(s) Change  No   
HDL  37 LDL  85 Triglycerides  176 Weight   107 kg (236 lb) Height   5' 9\" (1.753 m) BMI  34.92 Waist Circumference   48 inches Alcohol  Weekly Type  boubon, Vokha Amount  1-2 cups Dietitian Consult  No   
Nurse/Patient Discussion  Yes Nutrition Class  No   
Lipid Clinic Referral  No   
Education  Signs/Symptoms Hypo/Hyperglycemia Learning Barrier  Ready to learn Education Schedule Given  Yes Education  Tobacco triggers, CAD, Risk factors, Pulmonary Medications, Breaking the Dyspnea Cycle, Pulmonary A&P, lung/gas exchange, Nebulizer Use, Activity of Daily Living, Heart/Lung association, Bronchial Hygiene/controlled cough, Signs/Symptoms Hypo/hyperglycemia, Preventing Infection, DM & lung disease Target Goals  Correct demonstration of breathing techniques, Correct demonstration of MDI use and care, Complete cessation of tobacco cessation   
  
  
MD Signature: _______________________________________________ 
  
  
Date/Time:  _______________________________ 
  
  
Pharmacist Signature:_________________________________________ 
  
  
Date/Time: _______________________________

## 2020-12-14 ENCOUNTER — TRANSCRIBE ORDER (OUTPATIENT)
Dept: REGISTRATION | Age: 75
End: 2020-12-14

## 2020-12-14 ENCOUNTER — HOSPITAL ENCOUNTER (OUTPATIENT)
Dept: LAB | Age: 75
Discharge: HOME | End: 2020-12-14
Payer: MEDICARE

## 2020-12-14 ENCOUNTER — HOSPITAL ENCOUNTER (OUTPATIENT)
Dept: PULMONOLOGY | Age: 75
Discharge: HOME | End: 2020-12-14
Payer: MEDICARE

## 2020-12-14 VITALS — WEIGHT: 235 LBS | BODY MASS INDEX: 34.7 KG/M2

## 2020-12-14 DIAGNOSIS — I48.0 PAROXYSMAL ATRIAL FIBRILLATION (HCC): ICD-10-CM

## 2020-12-14 DIAGNOSIS — E11.29 TYPE II OR UNSPECIFIED TYPE DIABETES MELLITUS WITH RENAL MANIFESTATIONS, UNCONTROLLED(250.42) (HCC): ICD-10-CM

## 2020-12-14 DIAGNOSIS — I51.9 MYXEDEMA HEART DISEASE: ICD-10-CM

## 2020-12-14 DIAGNOSIS — E78.5 HYPERLIPEMIA: ICD-10-CM

## 2020-12-14 DIAGNOSIS — E11.65 TYPE II OR UNSPECIFIED TYPE DIABETES MELLITUS WITH RENAL MANIFESTATIONS, UNCONTROLLED(250.42) (HCC): ICD-10-CM

## 2020-12-14 DIAGNOSIS — E03.9 MYXEDEMA HEART DISEASE: ICD-10-CM

## 2020-12-14 DIAGNOSIS — I10 ESSENTIAL HYPERTENSION, MALIGNANT: ICD-10-CM

## 2020-12-14 DIAGNOSIS — I48.0 PAROXYSMAL ATRIAL FIBRILLATION (HCC): Primary | ICD-10-CM

## 2020-12-14 LAB
ALBUMIN SERPL-MCNC: 3.9 G/DL (ref 3.4–5)
ALBUMIN/GLOB SERPL: 1.2 {RATIO} (ref 0.8–1.7)
ALP SERPL-CCNC: 57 U/L (ref 45–117)
ALT SERPL-CCNC: 24 U/L (ref 16–61)
ANION GAP SERPL CALC-SCNC: 5 MMOL/L (ref 3–18)
AST SERPL-CCNC: 20 U/L (ref 10–38)
BASOPHILS # BLD: 0 K/UL (ref 0–0.1)
BASOPHILS NFR BLD: 1 % (ref 0–2)
BILIRUB SERPL-MCNC: 0.5 MG/DL (ref 0.2–1)
BUN SERPL-MCNC: 21 MG/DL (ref 7–18)
BUN/CREAT SERPL: 16 (ref 12–20)
CALCIUM SERPL-MCNC: 9.2 MG/DL (ref 8.5–10.1)
CHLORIDE SERPL-SCNC: 109 MMOL/L (ref 100–111)
CHOLEST SERPL-MCNC: 128 MG/DL
CO2 SERPL-SCNC: 28 MMOL/L (ref 21–32)
CREAT SERPL-MCNC: 1.31 MG/DL (ref 0.6–1.3)
DIFFERENTIAL METHOD BLD: ABNORMAL
EOSINOPHIL # BLD: 0.2 K/UL (ref 0–0.4)
EOSINOPHIL NFR BLD: 4 % (ref 0–5)
ERYTHROCYTE [DISTWIDTH] IN BLOOD BY AUTOMATED COUNT: 14.8 % (ref 11.6–14.5)
GLOBULIN SER CALC-MCNC: 3.3 G/DL (ref 2–4)
GLUCOSE SERPL-MCNC: 118 MG/DL (ref 74–99)
HBA1C MFR BLD: 7.5 % (ref 4.2–5.6)
HCT VFR BLD AUTO: 39.8 % (ref 36–48)
HDLC SERPL-MCNC: 37 MG/DL (ref 40–60)
HDLC SERPL: 3.5 {RATIO} (ref 0–5)
HGB BLD-MCNC: 13.3 G/DL (ref 13–16)
INR PPP: 2.4 (ref 0.8–1.2)
LDLC SERPL CALC-MCNC: 63.8 MG/DL (ref 0–100)
LIPID PROFILE,FLP: ABNORMAL
LYMPHOCYTES # BLD: 1.7 K/UL (ref 0.9–3.6)
LYMPHOCYTES NFR BLD: 29 % (ref 21–52)
MCH RBC QN AUTO: 31 PG (ref 24–34)
MCHC RBC AUTO-ENTMCNC: 33.4 G/DL (ref 31–37)
MCV RBC AUTO: 92.8 FL (ref 74–97)
MONOCYTES # BLD: 0.5 K/UL (ref 0.05–1.2)
MONOCYTES NFR BLD: 9 % (ref 3–10)
NEUTS SEG # BLD: 3.5 K/UL (ref 1.8–8)
NEUTS SEG NFR BLD: 57 % (ref 40–73)
PLATELET # BLD AUTO: 226 K/UL (ref 135–420)
PMV BLD AUTO: 11 FL (ref 9.2–11.8)
POTASSIUM SERPL-SCNC: 5.1 MMOL/L (ref 3.5–5.5)
PROT SERPL-MCNC: 7.2 G/DL (ref 6.4–8.2)
PROTHROMBIN TIME: 25.8 SEC (ref 11.5–15.2)
RBC # BLD AUTO: 4.29 M/UL (ref 4.7–5.5)
SODIUM SERPL-SCNC: 142 MMOL/L (ref 136–145)
T4 FREE SERPL-MCNC: 1 NG/DL (ref 0.7–1.5)
TRIGL SERPL-MCNC: 136 MG/DL (ref ?–150)
TSH SERPL DL<=0.05 MIU/L-ACNC: 4.4 UIU/ML (ref 0.36–3.74)
VLDLC SERPL CALC-MCNC: 27.2 MG/DL
WBC # BLD AUTO: 6 K/UL (ref 4.6–13.2)

## 2020-12-14 PROCEDURE — 83036 HEMOGLOBIN GLYCOSYLATED A1C: CPT

## 2020-12-14 PROCEDURE — 85610 PROTHROMBIN TIME: CPT

## 2020-12-14 PROCEDURE — 80061 LIPID PANEL: CPT

## 2020-12-14 PROCEDURE — 85025 COMPLETE CBC W/AUTO DIFF WBC: CPT

## 2020-12-14 PROCEDURE — 84439 ASSAY OF FREE THYROXINE: CPT

## 2020-12-14 PROCEDURE — 36415 COLL VENOUS BLD VENIPUNCTURE: CPT

## 2020-12-14 PROCEDURE — 84443 ASSAY THYROID STIM HORMONE: CPT

## 2020-12-14 PROCEDURE — G0424 PULMONARY REHAB W EXER: HCPCS

## 2020-12-14 PROCEDURE — 80053 COMPREHEN METABOLIC PANEL: CPT

## 2020-12-14 NOTE — PROGRESS NOTES
Monica Lara    Session Notes:     Visit # 8/36         Date: 12/14/2020         Time in: 1000         Time out: 1100         Modality    SpO2 % HR RPE RPD   []   Warm-up      # Mins:         []  Arm Ergometer      Huff:    # Mins:         [x]  NuStep: Intervals  Warm-up:4/5 min  Intervals:4-7 3:3  Cool-Down     Workload  4-7  # Mins:  25 97 86 15 7   []  Recumbent Bike     Level: RPM: # Mins:         []  Bodyweight Exercises:     # Mins:           []  Resistance bands     Color   Reps/Sets # Mins:         [x]  Free Weights  overhead  Arm raises: front   LBS:    4 Reps/Sets  10/2 #Mins:  5 97 85 14 4   One-one-One Activity: Pt was the only one being treated by RT during this time     Start:   Stop: #Mins:    60       Balance Work:  Step-ups:    Reps/Sets     #Mins         Stretching Total body   Lower back  10 97 82 14 3   Breathing Retraining     10 97 59 413 0   Recovery & Monitoring   [facility]

## 2020-12-16 ENCOUNTER — HOSPITAL ENCOUNTER (OUTPATIENT)
Dept: PULMONOLOGY | Age: 75
Discharge: HOME | End: 2020-12-16
Payer: MEDICARE

## 2020-12-16 VITALS — BODY MASS INDEX: 34.7 KG/M2 | WEIGHT: 235 LBS

## 2020-12-16 PROCEDURE — G0424 PULMONARY REHAB W EXER: HCPCS

## 2020-12-16 NOTE — PROGRESS NOTES
Azalea Salinas    Session Notes: pt arrived to the clinic late today     Visit # 9/36         Date: 12/16/2020         Time in: 1010         Time out: 1100         Modality    SpO2 % HR RPE RPD   []   Warm-up      # Mins:         []  Arm Ergometer      Huff:    # Mins:         [x]  NuStep: Intervals  Warm-up: 4/5min  Intervals: 4/7 1:1  Cool-Down     Workload  4-7  # Mins:  25 97 57 15 7   []  Recumbent Bike     Level: RPM: # Mins:         []  Bodyweight Exercises:     # Mins:           []  Resistance bands     Color   Reps/Sets # Mins:         []  Free Weights       LBS:     Reps/Sets   #Mins:         One-one-One Activity: Pt was the only one being treated by RT during this time     Start:   Stop: #Mins:    50       Balance Work:  Step-ups:    Reps/Sets     #Mins         [id number]   3 47 68 13 3   Breathing Retraining   1250  10 97 72 13 0   Recovery & Monitoring   [address]

## 2020-12-21 ENCOUNTER — HOSPITAL ENCOUNTER (OUTPATIENT)
Dept: PULMONOLOGY | Age: 75
Discharge: HOME | End: 2020-12-21
Payer: MEDICARE

## 2020-12-21 PROCEDURE — G0424 PULMONARY REHAB W EXER: HCPCS

## 2020-12-21 NOTE — PROGRESS NOTES
Denise Walter    Session Notes:     Visit # 10/36         Date: 12/21/2020         Time in: 1000         Time out: 1100         Modality    SpO2 % HR RPE RPD   []   Warm-up      # Mins:         []  Arm Ergometer      Huff:    # Mins:         [x]  NuStep: Intervals  Warm-up:5/5min  Intervals:5/7 1:1  Cool-Down     Workload  5-7  # Mins:  25 98 70 15 6   []  Recumbent Bike     Level: RPM: # Mins:         [x]  Bodyweight Exercises:   Seated marches  Knee extensions  # Mins:  5   97 90 14 4   [x]  Resistance bands  Overhead  Pull aparts   Color  red Reps10/Sets 5 98 88 14 4     []  Free Weights       LBS:     Reps/Sets   #Mins:         One-one-One Activity: Pt was the only one being treated by RT during this time     Start:   Stop: #Mins:    60       Balance Work:  Step-ups:    Reps/Sets     #Mins         Stretching   5 97 79 13 0   Breathing Retraining   [id number] 10/2 10 96 72 14 3   Recovery & Monitoring   68 [facility] Tressa

## 2020-12-21 NOTE — PROGRESS NOTES
Summary Report    Porfirio Lockwood   Pulmonary Tx Plan   Description: Male : 1945    Pulmonary ITP      MI PHASE II from 2020 in [facility]    Referral Date  20    Significant Cardiovascular History  Chronic atrial fibrillation    Co-morbidities  Diabetes    Stages of change   Maintenance    Oxygen Use  No    ITP Visit Type  Re-Assessment    1st Date of Exercise  20    ITP Next Review Date  20    Visit #/Total Visits      EF %  55 %    Pulmonary ITP  Exercise, Psychosocial, Tobacco, Nutrition, Education    Stages of Change  Action    Test  Six minute walk test    Distance Walked in   ft    Distance Walked (ft)  1018.5 ft    Peak HR  81    Peak BP  139/83    Peak RPE  14    Peak Mets  2.45    O2 Saturation  97-98    Stops  0    SPO2 Range  97-98    Mode  Track, Treadmill, Bike, Stepper, Ergometer, Elliptical    Frequency per week  2    Duration per session  one hour    Intensity  METS        2-4    Progression  slow-moderate    O2 Flow Rate (L/min)  0 l/min    Symptoms with Exercise  Shortness of breath    Target Heart Rate  112-128    Resistance Training  Yes    Assisted Devices  None    Resting BP  120/66    Peak BP  137/65    Is BP WDL?   Yes    Type  none    Comments  resistance band, stretching    Target Goal(s)  Individual exercise RX, BP < 140/90 or < 130/80, if DM or CKD, Aerobic activity 30 + minutes/day  5 days/week, Home activity    Stages of Change  Preparation    Consults  PCP    Currently Taking Psychotropic Meds  No    Medication Changes  Yes    Education  Advanced directives, Coping techniques, Environmental triggers, Impact self care behaviors on health, Relaxation techniques, Signs/Symptoms of depression, Stress management, Support groups, Tobacco dangers, Traveling with lung disease    Target Goal(s)  Assess presence or absence of depression using a valid screening tool, Demonstrates appropriate interaction with others, Engages in self-care behaviors, Maximizes coping skills, Positive support group    Uses Stress Mgmt Techniques  Yes    Patient Stated Psychosocial Goals  pt states he has no stressors    Stages of Change  Action    Tobacco Use  Yes    # Cigarette Smoked/Day  10-12    Smokeless Tobacco Use  No    Smoking Cessation Referral  Yes    Resource Information Provided  Yes    Target Goal  Complete cessation of tobacco use    Patient Stated Tobacco Goals  pt is willing to take gum or lozenges    Stages of Change  Preparation    Diabetes  Yes    HbA1C  8.3    HbA1C Date  04/29/19    BG at Home  No    Diabetes Med(s)  Metformin ER    Diabetes Med(s) Change  No    HDL  37    LDL  85    Triglycerides  176    Weight   107 kg (236 lb)    Height   5' 9\" (1.753 m)    BMI  34.92    Waist Circumference   48 inches    Alcohol  Weekly    Type  boubon, Vokha    Amount  1-2 cups    Dietitian Consult  No    Nurse/Patient Discussion  Yes    Nutrition Class  No    Lipid Clinic Referral  No    Education  Signs/Symptoms Hypo/Hyperglycemia    Learning Barrier  Ready to learn    Education Schedule Given  Yes    Education  Tobacco triggers, CAD, Risk factors, Pulmonary Medications, Breaking the Dyspnea Cycle, Pulmonary A&P, lung/gas exchange, Nebulizer Use, Activity of Daily Living, Heart/Lung association, Bronchial Hygiene/controlled cough, Signs/Symptoms Hypo/hyperglycemia, Preventing Infection, DM & lung disease    Target Goals  Correct demonstration of breathing techniques, Correct demonstration of MDI use and care, Complete cessation of tobacco cessation          MD Signature: _______________________________________________        Date/Time:  _______________________________        Pharmacist Signature:_________________________________________        Date/Time: _______________________________

## 2020-12-23 ENCOUNTER — HOSPITAL ENCOUNTER (OUTPATIENT)
Dept: PULMONOLOGY | Age: 75
Discharge: HOME | End: 2020-12-23
Payer: MEDICARE

## 2020-12-23 PROCEDURE — G0424 PULMONARY REHAB W EXER: HCPCS

## 2020-12-23 NOTE — PROGRESS NOTES
Lele Coad    Session Notes: pt presented with a lower blood pressure and felt a little lightheaded today after his workout,   he was encouraged purchase a home BP cuff and to to reach out to his PCP as has happened after rehab.      Visit #          Date:          Time in:          Time out:          Modality    SpO2 % HR RPE RPD   []   Warm-up      # Mins:         []  Arm Ergometer      Huff:    # Mins:         [x]  NuStep: Intervals  Warm-up:5/5min  Intervals:5/7 1:1  Cool-Down     Workload  5-7  # Mins:  25 97 72 14 4   []  Recumbent Bike     Level: RPM: # Mins:         []  Bodyweight Exercises:     # Mins:           []  Resistance bands     Color   Reps/Sets # Mins:         []  Free Weights       LBS:     Reps/Sets   #Mins:         One-one-One Activity: Pt was the only one being treated by RT during this time     Start:   Stop: #Mins:    60       Balance Work:  Step-ups:    Reps/Sets     #Mins         Stretching   59 99 37 13 2   Breathing Retraining   1125 10/2 10 97 79 13 2   Recovery & Monitoring   [address]

## 2020-12-28 ENCOUNTER — TELEPHONE (OUTPATIENT)
Dept: PULMONOLOGY | Age: 75
End: 2020-12-28

## 2020-12-28 NOTE — TELEPHONE ENCOUNTER
Pulmonary Rehab received a call from patient stating that he is still not feeling very well. He reports feeling sluggish and has a headache. I informed patient that if he is continuing to feel these symptoms or gets worse to go to the ED. He agreed and is going to try to get in with his PCP. He stated he will be out this week unless he starts to feel better. Thank you, Lamar Martinez

## 2021-01-11 ENCOUNTER — TELEPHONE (OUTPATIENT)
Dept: PULMONOLOGY | Age: 76
End: 2021-01-11

## 2021-01-11 NOTE — TELEPHONE ENCOUNTER
Pulmonary Rehab called patient and spoke to him about returning to rehab. He stated that he is still waiting for a call back from his PCP and will call him today to see what the next step is. He will let us know about his appointment on Wednesday. Thank you, Antony Cervantes

## 2021-01-18 ENCOUNTER — APPOINTMENT (OUTPATIENT)
Dept: PULMONOLOGY | Age: 76
End: 2021-01-18
Payer: MEDICARE

## 2021-01-20 ENCOUNTER — APPOINTMENT (OUTPATIENT)
Dept: PULMONOLOGY | Age: 76
End: 2021-01-20
Payer: MEDICARE

## 2021-01-25 ENCOUNTER — HOSPITAL ENCOUNTER (OUTPATIENT)
Dept: PULMONOLOGY | Age: 76
Discharge: HOME | End: 2021-01-25
Payer: MEDICARE

## 2021-01-25 PROCEDURE — G0424 PULMONARY REHAB W EXER: HCPCS

## 2021-01-25 NOTE — PROGRESS NOTES
Devon Age    Session Notes:     Visit #          Date:          Time in:          Time out:          Modality    SpO2 % HR RPE RPD   []   Warm-up      # Mins:         [x]  Arm Ergometer      25  # Mins:  5 98 98 14 4   []  NuStep: Intervals  Warm-up:  Intervals:  Cool-Down     Workload  5  # Mins:  20 97 81 13 3   []  Recumbent Bike     Level: RPM: # Mins:         [x]  Bodyweight Exercises:   Seated mareches  # Mins:  5   98 83 13 4   []  Resistance bands     Color   Reps/Sets # Mins:         []  Free Weights       LBS:     Reps/Sets   #Mins:         One-one-One Activity: Pt was the only one being treated by RT during this time     Start:   Stop: #Mins:    60       Balance Work:  Step-ups:    Reps/Sets     #Mins         Stretching   10 98 80 12 0   Breathing Retraining   1375 10/2 10 97 71 12 0   Recovery & Monitoring   [facility]

## 2021-01-27 ENCOUNTER — HOSPITAL ENCOUNTER (OUTPATIENT)
Dept: PULMONOLOGY | Age: 76
Discharge: HOME | End: 2021-01-27
Payer: MEDICARE

## 2021-01-27 PROCEDURE — G0424 PULMONARY REHAB W EXER: HCPCS

## 2021-01-27 NOTE — PROGRESS NOTES
Jesús Stephenson    Session Notes:     Visit # 13/36         Date: 1/27/2021         Time in: 1000         Time out: 1100         Modality    SpO2 % HR RPE RPD   []   Warm-up      # Mins:         []  Arm Ergometer      Huff:    # Mins:         [x]  NuStep: Intervals  Warm-up:5/2min  Intervals:5 -2 min, 7 for 3min  Cool-Down5:2min     Workload  5-7  # Mins:  20 97 72 14 3   []  Recumbent Bike     Level: RPM: # Mins:         []  Bodyweight Exercises:     # Mins:           [x]  Resistance bands     Color  red Reps/Sets # Mins:  10 97 71 13 5   []  Free Weights       LBS:     Reps/Sets   #Mins:         One-one-One Activity: Pt was the only one being treated by RT during this time     Start:   Stop: #Mins:    60       Balance Work:  Step-ups:    Reps/Sets     #Mins         Stretching   10 97 70 12 0   Breathing Retraining    10/2 10 97 89 0 0   Recovery & Monitoring   [facility]

## 2021-02-01 ENCOUNTER — HOSPITAL ENCOUNTER (OUTPATIENT)
Dept: PULMONOLOGY | Age: 76
Discharge: HOME | End: 2021-02-01
Payer: MEDICARE

## 2021-02-01 PROCEDURE — G0424 PULMONARY REHAB W EXER: HCPCS

## 2021-02-03 ENCOUNTER — HOSPITAL ENCOUNTER (OUTPATIENT)
Dept: PULMONOLOGY | Age: 76
Discharge: HOME | End: 2021-02-03
Payer: MEDICARE

## 2021-02-03 PROCEDURE — G0424 PULMONARY REHAB W EXER: HCPCS

## 2021-02-03 NOTE — PROGRESS NOTES
July Townsend    Session Notes:  Pt arrived late due to knee \"locking up\" and lower back pain. Will back off of level of resistance, and concentrate on gentle   Exercise and stretching.      Visit # 15./36         Date: 02/03/2021         Time in: 1015         Time out: 1100         Modality    SpO2 HR RPE RPD   []   Warm-up     # Mins:       []  Arm Ergometer      Huff:  # Mins:       [x]  NuStep: Intervals  Warm-up: 5 mins @ 4  Intervals:5-6  Cool-Down 5 mins @ 4     Workload  4-6  # Mins:  22 98 88 13 3   []  Recumbent Bike     Level: RPM: # Mins:       []  Bodyweight Exercises:     # Mins:       []  Resistance bands     Color Reps/Sets # Mins:       []  Free Weights       LBS:   Reps/Sets   #Mins:       One-one-One Activity: Pt was the only one being treated by RT during this time     Start:  1895 Stop:  1100 #Mins:  45       Balance Work:  Step-ups:  Reps/Sets   #Mins       Stretching   72 65 48 12 2   Breathing Retraining    10 x 2 10 98 79 12 2   Recovery & Monitoring   [address], RT Jackson Coma, RT Jackson Coma, RT   Jackson Coma, RT Jackson Coma, RT

## 2021-02-08 ENCOUNTER — HOSPITAL ENCOUNTER (OUTPATIENT)
Dept: PULMONOLOGY | Age: 76
Discharge: HOME | End: 2021-02-08
Payer: MEDICARE

## 2021-02-08 ENCOUNTER — OFFICE VISIT (OUTPATIENT)
Dept: CARDIOLOGY CLINIC | Age: 76
End: 2021-02-08
Payer: MEDICARE

## 2021-02-08 VITALS
SYSTOLIC BLOOD PRESSURE: 108 MMHG | HEART RATE: 92 BPM | BODY MASS INDEX: 35.84 KG/M2 | HEIGHT: 69 IN | OXYGEN SATURATION: 97 % | DIASTOLIC BLOOD PRESSURE: 78 MMHG | WEIGHT: 242 LBS

## 2021-02-08 DIAGNOSIS — E11.21 TYPE 2 DIABETES WITH NEPHROPATHY (HCC): ICD-10-CM

## 2021-02-08 DIAGNOSIS — E78.00 PURE HYPERCHOLESTEROLEMIA: ICD-10-CM

## 2021-02-08 DIAGNOSIS — I48.19 PERSISTENT ATRIAL FIBRILLATION (HCC): ICD-10-CM

## 2021-02-08 DIAGNOSIS — J44.9 CHRONIC OBSTRUCTIVE PULMONARY DISEASE, UNSPECIFIED COPD TYPE (HCC): ICD-10-CM

## 2021-02-08 DIAGNOSIS — I25.10 CORONARY ARTERY DISEASE INVOLVING NATIVE CORONARY ARTERY OF NATIVE HEART WITHOUT ANGINA PECTORIS: ICD-10-CM

## 2021-02-08 DIAGNOSIS — I35.0 AORTIC VALVE STENOSIS, ETIOLOGY OF CARDIAC VALVE DISEASE UNSPECIFIED: Primary | ICD-10-CM

## 2021-02-08 DIAGNOSIS — I10 ESSENTIAL HYPERTENSION: ICD-10-CM

## 2021-02-08 PROCEDURE — G8427 DOCREV CUR MEDS BY ELIG CLIN: HCPCS | Performed by: INTERNAL MEDICINE

## 2021-02-08 PROCEDURE — G8536 NO DOC ELDER MAL SCRN: HCPCS | Performed by: INTERNAL MEDICINE

## 2021-02-08 PROCEDURE — G8417 CALC BMI ABV UP PARAM F/U: HCPCS | Performed by: INTERNAL MEDICINE

## 2021-02-08 PROCEDURE — 3017F COLORECTAL CA SCREEN DOC REV: CPT | Performed by: INTERNAL MEDICINE

## 2021-02-08 PROCEDURE — 1101F PT FALLS ASSESS-DOCD LE1/YR: CPT | Performed by: INTERNAL MEDICINE

## 2021-02-08 PROCEDURE — G0424 PULMONARY REHAB W EXER: HCPCS

## 2021-02-08 PROCEDURE — 99215 OFFICE O/P EST HI 40 MIN: CPT | Performed by: INTERNAL MEDICINE

## 2021-02-08 PROCEDURE — G8752 SYS BP LESS 140: HCPCS | Performed by: INTERNAL MEDICINE

## 2021-02-08 PROCEDURE — 3046F HEMOGLOBIN A1C LEVEL >9.0%: CPT | Performed by: INTERNAL MEDICINE

## 2021-02-08 PROCEDURE — 93000 ELECTROCARDIOGRAM COMPLETE: CPT | Performed by: INTERNAL MEDICINE

## 2021-02-08 PROCEDURE — 2022F DILAT RTA XM EVC RTNOPTHY: CPT | Performed by: INTERNAL MEDICINE

## 2021-02-08 PROCEDURE — G8510 SCR DEP NEG, NO PLAN REQD: HCPCS | Performed by: INTERNAL MEDICINE

## 2021-02-08 PROCEDURE — G8754 DIAS BP LESS 90: HCPCS | Performed by: INTERNAL MEDICINE

## 2021-02-08 NOTE — PROGRESS NOTES
Penni Jeans presents today for   Chief Complaint   Patient presents with    Follow-up     Sherly Graves Pt, 6 month follow up       Penni Jeans preferred language for health care discussion is english/other. Is someone accompanying this pt? no    Is the patient using any DME equipment during [address]? no    Depression Screening:  3 most recent PHQ Screens 2/8/2021   Little interest or pleasure in doing things Not at all   Feeling down, depressed, irritable, or hopeless Not at all   Total Score PHQ 2 0   Trouble falling or staying asleep, or sleeping too much -   Feeling tired or having little energy -   Poor appetite, weight loss, or overeating -   Feeling bad about yourself - or that you are a failure or have let yourself or your family down -   Trouble concentrating on things such as school, work, reading, or watching TV -   Moving or speaking so slowly that other people could have noticed; or the opposite being so fidgety that others notice -   Thoughts of being better off dead, or hurting yourself in some way -   PHQ 9 Score -       Learning Assessment:  Learning Assessment 2/8/2021   PRIMARY LEARNER Patient   HIGHEST LEVEL OF EDUCATION - PRIMARY LEARNER  -   BARRIERS PRIMARY LEARNER -   CO-LEARNER CAREGIVER -   PRIMARY LANGUAGE ENGLISH   LEARNER PREFERENCE PRIMARY DEMONSTRATION     -   ANSWERED BY patient   RELATIONSHIP SELF       Abuse Screening:  Abuse Screening Questionnaire 2/8/2021   Do you ever feel afraid of your partner? N   Are you in a relationship with someone who physically or mentally threatens you? N   Is it safe for you to go home? Y       Fall Risk  Fall Risk Assessment, last 12 mths 2/8/2021   Able to walk? Yes   Fall in past 12 months? 0   Do you feel unsteady? 0   Are you worried about falling 0       Pt currently taking Anticoagulant therapy? Warfarin    Coordination of Care:  1. Have you been to the ER, urgent care clinic since your last visit? Hospitalized since your last visit? no    2. Have you seen or consulted any other health care providers outside of the [address] [place] [address] since your last visit? Include any pap smears or colon screening.  no

## 2021-02-08 NOTE — PATIENT INSTRUCTIONS
Follow up in July with EKG or sooner if needed. ECHO - aortic valve stenosis - June 2021 Increase Metoprolol to 100mg daily Decrease Isosorbide mononitrate to 30mg daily

## 2021-02-08 NOTE — PROGRESS NOTES
HISTORY OF PRESENT ILLNESS  Bishop Collado is a 75 y.o. male.    HPI    Patient presents for a follow-up office visit.  He was previously followed by Dr. Graves up until his jail at the end of last year.  He has a past medical history significant for persistent atrial fibrillation, moderate nonobstructive coronary disease and moderate aortic valve stenosis.  He is also a longtime smoker with a 50-pack-year smoking history still smoking half a pack of cigarettes a day.  As result he does have significant COPD.    He was last seen in the office 6 months ago.  He underwent a repeat echocardiogram in June 2020 which showed preserved LV function, EF 55 to 60% with moderate aortic valve stenosis and a mean valve gradient 25 mmHg.  His last cardiac catheterization was in 2018 which demonstrated moderate nonobstructive CAD involving his left main and mid LAD along with his mid left circumflex.  His mean gradient was 25 mmHg.    At his last office visit, his metoprolol dosage was decreased from 100 down to 50 mg daily primarily due to low blood pressure.  He states since decreasing the dose, his activity tolerance has decreased and he is having more shortness of breath with any exertional activity.  He denies any exertional chest pain, no orthopnea, no PND, no leg swelling.  No exertional dizziness or syncope.    Past Medical History:   Diagnosis Date   • Atrial fibrillation (HCC) 1998   • CAD (coronary artery disease)    • Cardiac nuclear imaging test, normal 04/01/2016    Low risk.  No ischemia or prior infarction.  EF 52%.  Neg EKG on pharm stress test.   • Diabetes (HCC)    • DM (diabetes mellitus) (HCC)    • GERD (gastroesophageal reflux disease)    • Hematuria 2001   • HLD (hyperlipidemia)    • HTN (hypertension)    • HTN (hypertension)    • Hypothyroid    • Pancreatitis      Current Outpatient Medications   Medication Sig Dispense Refill   • metoprolol succinate (TOPROL-XL) 100 mg tablet Take 1 Tab by mouth  daily. (Patient taking differently: Take 50 mg by mouth daily. Take 1/2 tablet by mouth daily.) 90 Tab 3    glimepiride (AMARYL) 4 mg tablet       fluticasone-umeclidinium-vilanterol (Trelegy Ellipta) 100-62.5-25 mcg inhaler Take 1 Puff by inhalation daily. 2 Inhaler 0    furosemide (LASIX) 20 mg tablet TAKE 1 TABLET BY MOUTH  DAILY 90 Tab 3    isosorbide mononitrate ER (IMDUR) 60 mg CR tablet TAKE 1 TABLET BY MOUTH  EVERY MORNING 90 Tab 3    nitroglycerin (NITROSTAT) 0.4 mg SL tablet 1 Tab by SubLINGual route every five (5) minutes as needed for Chest Pain. 1 Bottle 3    metFORMIN ER (GLUCOPHAGE XR) 500 mg tablet 500 mg. Taking 2 tablets daily      aspirin delayed-release 81 mg tablet 1 tablet      warfarin (COUMADIN) 5 mg tablet Take 1 tablet by mouth once a day      levothyroxine (SYNTHROID) 150 mcg tablet Take 150 mcg by mouth daily.  albuterol (PROVENTIL HFA, VENTOLIN HFA, PROAIR HFA) 90 mcg/actuation inhaler Take 2 Puffs by inhalation every six (6) hours as needed for Wheezing.  MULTIVIT-MINERALS/FOLIC ACID (ONE DAILY GUMMY VITES PO) Take  by mouth daily.  pantoprazole (PROTONIX) 40 mg tablet Take 40 mg by mouth daily.  simvastatin (ZOCOR) 40 mg tablet Take 40 mg by mouth daily.  cholecalciferol, vitamin D3, 2,000 unit tab Take 1 Tab by mouth daily. No Known Allergies     Social History     Tobacco Use    Smoking status: Current Every Day Smoker     Packs/day: 1.50     Years: 55.00     Pack years: 82.50    Smokeless tobacco: Never Used    Tobacco comment: 10 cigarettes per day lately   Substance Use Topics    Alcohol use: Yes     Comment: every other day    Drug use: No     Family History   Problem Relation Age of Onset    Diabetes Mother     Diabetes Father     Stroke Father          Review of Systems   Constitutional: Negative for chills, fever and weight loss. HENT: Negative for nosebleeds. Eyes: Negative for blurred vision and double vision. Respiratory: Positive for shortness of breath. Negative for cough and wheezing. Cardiovascular: Negative for chest pain, palpitations, orthopnea, claudication, leg swelling and PND. Gastrointestinal: Negative for abdominal pain, heartburn, nausea and vomiting. Genitourinary: Negative for dysuria and hematuria. Musculoskeletal: Negative for falls and myalgias. Skin: Negative for rash. Neurological: Negative for dizziness, focal weakness and headaches. Endo/Heme/Allergies: Does not bruise/bleed easily. Psychiatric/Behavioral: Negative for substance abuse. Visit Vitals  /78 (BP 1 Location: Left upper arm, BP Patient Position: Sitting, BP Cuff Size: Large adult)   Pulse 92   Ht 5' 9\" (1.753 m)   Wt 109.8 kg (242 lb)   SpO2 97%   BMI 35.74 kg/m²         Physical Exam   Constitutional: He is oriented to person, place, and time. He appears well-developed and well-nourished. HENT:   Head: Normocephalic and atraumatic. Eyes: Conjunctivae are normal.   Neck: Neck supple. No JVD present. Carotid bruit is not present. Cardiovascular: Normal rate, regular rhythm, S1 normal, S2 normal and normal pulses. Exam reveals distant heart sounds. Exam reveals no gallop and no S3.   Murmur heard. Midsystolic murmur is present with a grade of 2/6. Pulmonary/Chest: He has decreased breath sounds. He has no wheezes. He has no rhonchi. He has no rales. Abdominal: Soft. Bowel sounds are normal. There is no abdominal tenderness. Musculoskeletal:         General: No tenderness, deformity or edema. Neurological: He is alert and oriented to person, place, and time. Skin: Skin is warm and dry. Psychiatric: He has a normal mood and affect. His behavior is normal.     EKG: Atrial fibrillation, left anterior fascicular block, right bundle branch block, nonspecific T wave abnormality. No change compared to the previous EKG. ASSESSMENT and PLAN  Encounter Diagnoses   Name Primary?     Aortic valve stenosis, moderate Yes    Coronary artery disease involving native coronary artery of native heart without angina pectoris     Chronic obstructive pulmonary disease, unspecified COPD type ([address]     Persistent atrial fibrillation ([address]     Pure hypercholesterolemia     Type 2 diabetes with nephropathy ([address]     Essential hypertension      Moderate aortic valve stenosis. This has remained in the moderate range for the past 2 years. He had a mean valve gradient invasively in 2018 of 25 mmHg. His last echocardiogram was in June 2020 which showed a similar gradient. I have recommended a repeat echocardiogram in June of this year for comparison. Longstanding persistent atrial fibrillation. Patient's heart rate is in the 90s at rest today. I suspect that when he does any activity this gets much faster and may be leading to his shortness of breath, so I have recommended increasing his metoprolol XL back to 100 mg daily. He remains on warfarin for oral anticoagulation. His goal INR is between 2 and 3. Is being followed by his PCP. Essential hypertension. Patient blood pressure is lower limits of normal today. Since I am increasing the metoprolol XL, I would like to decrease his isosorbide down to 30 mg daily. Nonobstructive coronary artery disease. This was last assessed by cardiac catheterization in 2018. He had an ostial 35% left main stenosis along with a 55% mid LAD lesion and a 40% circumflex lesion. Medical therapy has been recommended. Dyslipidemia. Patient has been managed with simvastatin 40 mg daily. I prefer his LDL to be less than 70. This is being followed by his PCP. Tobacco use disorder. Patient has a 50+ pack year smoking history. He recently cut back to half a pack a day. He was congratulated encouraged to consider complete smoking cessation. COPD. This appeared mild with chronic bronchitis on a CT scan of his chest in 2020.   He does have inhalers which is being prescribed by his PCP. Follow-up in 5 months, sooner if needed.

## 2021-02-08 NOTE — PROGRESS NOTES
Brittany Easley    Session Notes:     Visit # 16/36         Date: 2/8/2021         Time in: 7221         Time out: 1100         Modality    SpO2 % HR RPE RPD   []   Warm-up      # Mins:         []  Arm Ergometer      Huff:    # Mins:         [x]  NuStep: Intervals  Warm-up:4 5min  Intervals:4-7,3:3, 7 for 8 mi8m  Cool-Down: 4/3min     Workload  4-7  # Mins:  18 98 86 14 5   []  Recumbent Bike     Level: RPM: # Mins:         []  Bodyweight Exercises:     # Mins:           []  Resistance bands     Color   Reps/Sets # Mins:         []  Free Weights       LBS:     Reps/Sets   #Mins:         One-one-One Activity: Pt was the only one being treated by RT during this time     Start:   Stop: #Mins:    38       Balance Work:  Step-ups:    Reps/Sets     #Mins         [id number]   1 20 01 13 3   Breathing Retraining   1000  10 97 79 12 2   Recovery & Monitoring   [facility]

## 2021-02-09 NOTE — PROGRESS NOTES
Summary Report  Pt reports having increased shortness of breath he feels is the result of his decrease in blood pressure medication. Pt states he takes his rescue and maintenance inhalers 2x/day. He agreed to bring his spacer/inhaler to the clinic for re demonstration during his next visit. He's a current smoker who's been encouraged and agreed to follow-up with his PCP for lozenges or the patch, gum interferes with his dentures. He recently resumed [place] (low BP prior to medication adjustments) and has been attending regularly. Viridiana Donohue  Pulmonary Tx Plan  Description: Male : 1945   Pulmonary ITP     [place] PHASE II from 2021 in [place] [address]   Most recent reading at 2021  9:36 AM   Referral Date 21   Significant Cardiovascular History Chronic atrial fibrillation   Co-morbidities Diabetes   Stages of change  Maintenance   Oxygen Use No   ITP Visit Type Re-Assessment   1st Date of Exercise 20   ITP Next Review Date 21   Visit #/Total Visits    EF % 55 %   Pulmonary ITP Exercise, Psychosocial, Tobacco, Nutrition, Education   Stages of Change Action   Test Six minute walk test   Distance Walked in  ft   Distance Walked (ft) 1018.5 ft   Peak HR 81   Peak /83   Peak RPE 14   Peak Mets 2.45   O2 Saturation 97-98   Stops 0   SPO2 Range 97-98   Mode Track, Treadmill, Bike, Stepper, Ergometer, Elliptical   Frequency per week 2   Duration per session one hour   Intensity  METS       2-4   Progression slow-moderate   O2 Sat (%)    O2 Flow Rate (L/min) 0 l/min   Symptoms with Exercise Shortness of breath   Target Heart Rate 112-128   Resistance Training Yes   Assisted Devices None   Resting /67   Peak /72   Is BP WDL?  Yes   Type resistance   Comments resistance band, stretching   Education Self pulse, Exercise safety, Blood pressure medications, RPE scale, Warm up/Cool down, Equipment orientation, Understand blood pressure, Home exercise plan, Energy conservation, O2 therapy, RPE/RPD scale   Target Goal(s) Individual exercise RX, BP < 140/90 or < 130/80, if DM or CKD, Aerobic activity 30 + minutes/day  5 days/week, Home activity   Stages of Change Preparation   Interventions No intervention indicated   Consults PCP   Currently Taking Psychotropic Meds No   Medication Changes No   Education Advanced directives, Coping techniques, Environmental triggers, Impact self care behaviors on health, Relaxation techniques, Signs/Symptoms of depression, Stress management, Support groups, Tobacco dangers, Traveling with lung disease   Target Goal(s) Assess presence or absence of depression using a valid screening tool, Maximizes coping skills, Positive support group   Uses Stress Mgmt Techniques Yes   Patient Stated Psychosocial Goals pt states he has no stressors   Stages of Change Action   Tobacco Use Yes   Date Started --  [\"years ago\"]   # Cigarette Smoked/Day 10-12   Smokeless Tobacco Use No   Smoking Cessation Referral Yes   Resource Information Provided Yes   Target Goal Complete cessation of tobacco use   Patient Stated Tobacco Goals pt is willing to take gum or lozenges   Stages of Change Preparation   Diabetes Yes   HbA1C 7.5   HbA1C Date 12/14/20   BG at Home No  [pt fears needles]   Diabetes Med(s) Metformin ER   Diabetes Med(s) Change No   HDL 37   LDL 85   Triglycerides 176   Weight  106.6 kg (235 lb)   Height  5' 9\" (1.753 m)   BMI 34.78   Waist Circumference  48   Alcohol Weekly   Type boubon, Vokha   Amount 1-2 cups   Dietitian Consult No   Nurse/Patient Discussion Yes   Nutrition Class No   Lipid Clinic Referral No   Education Low fat & cholesterol diet, DM & CAD relationship, Low sodium diet, Healthy eating, Nutrition and lung disease, Supplemental dietary needs, Special diet   Learning Barrier Ready to learn   Education Schedule Given Yes   Education Tobacco triggers, Risk factors, Med Compliance, Cardiac A&P, Signs/Symptoms of Angina , Pulmonary Medications, Breaking the Dyspnea Cycle, Pulmonary A&P, lung/gas exchange, Heart/Lung association, Activity of Daily Living, Nebulizer Use   Target Goals Correct demonstration of breathing techniques, Correct demonstration of MDI use and care, Complete cessation of tobacco cessation         MD Signature: _______________________________________________        Date/Time:  _______________________________        Pharmacist Signature:_________________________________________        Date/Time: _______________________________

## 2021-02-10 ENCOUNTER — HOSPITAL ENCOUNTER (OUTPATIENT)
Dept: PULMONOLOGY | Age: 76
Discharge: HOME | End: 2021-02-10
Payer: MEDICARE

## 2021-02-10 PROCEDURE — G0424 PULMONARY REHAB W EXER: HCPCS

## 2021-02-10 NOTE — PROGRESS NOTES
Wendi Reesin    Session Notes:     Visit # 17/36         Date: 02/10/2021         Time in: 1000         Time out: 1100         Modality    SpO2 HR RPE RPD   []   Warm-up     # Mins:       []  Arm Ergometer      Huff:  # Mins:       [x]  NuStep: Intervals  Warm-up:4/5mins  Intervals: 5-7 3min intervals  Cool-Down 4/3mins     Workload  4-7  # Mins:20 93 86 14 4   []  Recumbent Bike     Level: RPM: # Mins:       []  Bodyweight Exercises:     # Mins:       []  Resistance bands     Color Reps/Sets # Mins:       [x]  Free Weights       LBS:   Reps/Sets  10/2   #Mins:  10 97 93 13 3   One-one-One Activity: Pt was the only one being treated by RT during this time     Start: Stop: #Mins:  60       Balance Work:  Step-ups:  Reps/Sets   #Mins       Education: HTN/DM2   10       Breathing Retraining    10 x 2 10 96 88 12 2   Recovery & Monitoring   [address], RT Bazan Backer, RT Bazan Backer, RT   Bazan Backer, RT Bazan Backer, RT

## 2021-02-15 ENCOUNTER — HOSPITAL ENCOUNTER (OUTPATIENT)
Dept: PULMONOLOGY | Age: 76
Discharge: HOME | End: 2021-02-15
Payer: MEDICARE

## 2021-02-15 PROCEDURE — G0424 PULMONARY REHAB W EXER: HCPCS

## 2021-02-15 NOTE — PROGRESS NOTES
Jesús Sickle    Session Notes:pt arrived late to the clinic     Visit # 18/36         Date: 2/15/2021         Time in: 1017         Time out: 1100         Modality    SpO2 % HR RPE RPD   []   Warm-up      # Mins:         []  Arm Ergometer      Huff:    # Mins:         [x]  NuStep: Intervals  Warm-up: 4/5min  Intervals:4/7 4min  Cool-Down     Workload  4-7  # Mins:  20 97 71 13 4   []  Recumbent Bike     Level: RPM: # Mins:         []  Bodyweight Exercises:     # Mins:           []  Resistance bands     Color   Reps/Sets # Mins:         [x]  Free Weights       LBS:  3   Reps/Sets  10/2 #Mins:  59 44 38 13 3   One-one-One Activity: Pt was the only one being treated by RT during this time     Start:   Stop: #Mins:  43       Balance Work:  Step-ups:    Reps/Sets     #Mins         Stretching          Breathing Retraining     10 97 69 12 2   Recovery & Monitoring   [address] [facility] Jam Watts

## 2021-02-17 ENCOUNTER — HOSPITAL ENCOUNTER (OUTPATIENT)
Dept: PULMONOLOGY | Age: 76
Discharge: HOME | End: 2021-02-17
Payer: MEDICARE

## 2021-02-17 PROCEDURE — G0237 THERAPEUTIC PROCD STRG ENDUR: HCPCS

## 2021-02-17 PROCEDURE — G0424 PULMONARY REHAB W EXER: HCPCS

## 2021-02-17 NOTE — PROGRESS NOTES
Rio Rodríguez    Session Notes:     Visit # 19/36         Date: 02/17/2021         Time in: 0992         Time out: 1100         Modality    SpO2 HR RPE RPD   []   Warm-up     # Mins:       []  Arm Ergometer      Huff:  # Mins:       [x]  NuStep: Intervals  Warm-up: 4/5mins  Intervals:  Cool-Down     Workload  4-7  # Mins:  22 93 87 13 3   []  Recumbent Bike     Level: RPM: # Mins:       []  Bodyweight Exercises:     # Mins:       []  Resistance bands     Color Reps/Sets # Mins:       []  Free Weights       LBS:   Reps/Sets   #Mins:       One-one-One Activity: Pt was the only one being treated by RT during this time     Start: 5484 Stop:1100 #Mins:  45       Balance Work:  Step-ups:  Reps/Sets   #Mins       Stretching          Breathing Retraining    10 x 2 10 97 69 12 2   Recovery & Monitoring   [address], RT Cobb Leota, RT Martinez Leota, RT   Cobb Leota, RT Martinez Leota, RT

## 2021-02-22 ENCOUNTER — HOSPITAL ENCOUNTER (OUTPATIENT)
Dept: PULMONOLOGY | Age: 76
Discharge: HOME | End: 2021-02-22
Payer: MEDICARE

## 2021-02-22 PROCEDURE — G0424 PULMONARY REHAB W EXER: HCPCS

## 2021-02-22 NOTE — PROGRESS NOTES
Candance Pais    Session Notes:     Visit # 20/36         Date: 2/22/1021         Time in: 1000         Time out: 1100 13        Modality    SpO2 % HR RPE RPD   []   Warm-up      # Mins:         []  Arm Ergometer      Huff:4/3min  # Mins:         [x]  NuStep: Intervals  Warm-up:4/3min  Intervals:7/4 min, 5/5 min, 6/6 min  Cool-Down     Workload  4-7  # Mins:  24 97 88 14 4   []  Recumbent Bike     Level: RPM: # Mins:         []  Bodyweight Exercises:     # Mins:           []  Resistance bands     Color   Reps/Sets # Mins:         []  Free Weights       LBS:     Reps/Sets   #Mins:         One-one-One Activity: Pt was the only one being treated by RT during this time     Start:   Stop: #Mins:    60       Balance Work:  Step-ups:    Reps/Sets     #Mins         Stretching  3 sets 10 97 85 12 1   Breathing Retraining   1125  10 98 86 12 1   Recovery & Monitoring   [address] [facility] Diego Mckinney

## 2021-02-24 ENCOUNTER — HOSPITAL ENCOUNTER (OUTPATIENT)
Dept: PULMONOLOGY | Age: 76
Discharge: HOME | End: 2021-02-24
Payer: MEDICARE

## 2021-02-24 VITALS — WEIGHT: 243 LBS | BODY MASS INDEX: 35.88 KG/M2

## 2021-02-24 PROCEDURE — G0424 PULMONARY REHAB W EXER: HCPCS

## 2021-02-24 NOTE — PROGRESS NOTES
Opal Anger    Session Notes:     Visit # 21/36         Date: 2/24/2021         Time in: 1000         Time out: 1100         Modality    SpO2 % HR RPE RPD   []   Warm-up      # Mins:         []  Arm Ergometer      Huff:    # Mins:         [x]  NuStep: Intervals  Warm-up:4/5mn  Intervals\" ascending from 4  Cool-Down     Workload  4-7  # Mins:  15 97 90 12 2   []  Recumbent Bike     Level: RPM: # Mins:         [x]  Bodyweight Exercises:     # Mins:  10   97 75 12 2   []  Resistance bands     Color   Reps/Sets # Mins:         []  Free Weights       LBS:     Reps/Sets     #Mins:         One-one-One Activity: Pt was the only one being treated by RT during this time     Start:   Stop: #Mins:    60       Balance Work:  Step-ups:    Reps/Sets     #Mins         [id number]   16 63 40 12 1   Breathing Retraining     10 95 58 12 1   Recovery & Monitoring   [address] [facility] Yani Rodriguez

## 2021-03-01 ENCOUNTER — HOSPITAL ENCOUNTER (OUTPATIENT)
Dept: PULMONOLOGY | Age: 76
Discharge: HOME | End: 2021-03-01
Payer: MEDICARE

## 2021-03-01 VITALS — WEIGHT: 243 LBS | BODY MASS INDEX: 35.88 KG/M2

## 2021-03-01 PROCEDURE — G0424 PULMONARY REHAB W EXER: HCPCS

## 2021-03-01 RX ORDER — ISOSORBIDE MONONITRATE 60 MG/1
TABLET, EXTENDED RELEASE ORAL
Qty: 90 TAB | Refills: 3 | Status: SHIPPED | OUTPATIENT
Start: 2021-03-01 | End: 2022-01-07

## 2021-03-01 NOTE — PROGRESS NOTES
Candance Pais    Session Notes:     Visit # 22/36         Date: 3/1/2021         Time in: 3256         Time out: 1200         Modality    SpO2 % HR RPE RPD   []   Warm-up      # Mins:         []  Arm Ergometer      Huff:    # Mins:         [x]  NuStep: Intervals  Warm-up:4/4min  Intervals:4-7 ascending/descending  Cool-Down4/2min     Workload  4-7  # Mins:  26 97 82 13 3   []  Recumbent Bike     Level: RPM: # Mins:         []  Bodyweight Exercises:     # Mins:           []  Resistance bands     Color   Reps/Sets # Mins:         []  Free Weights       LBS:     Reps/Sets   #Mins:         One-one-One Activity: Pt was the only one being treated by RT during this time     Start:   Stop: #Mins:    48       Balance Work:  Step-ups:    Reps/Sets     #Mins         Stretching   5 97 70 12 2   Breathing Retraining     10 97 73 12 2   Recovery & Monitoring   [place] [address] Diego Mckinney

## 2021-03-03 ENCOUNTER — HOSPITAL ENCOUNTER (OUTPATIENT)
Dept: PULMONOLOGY | Age: 76
Discharge: HOME | End: 2021-03-03
Payer: MEDICARE

## 2021-03-03 VITALS — WEIGHT: 236 LBS | BODY MASS INDEX: 34.85 KG/M2

## 2021-03-03 PROCEDURE — G0424 PULMONARY REHAB W EXER: HCPCS

## 2021-03-03 NOTE — PROGRESS NOTES
Jesús Stephenson    Session Notes:     Visit # 23/36         Date: 03/03/21         Time in: 1000         Time out: 1100         Modality    SpO2 HR RPE RPD   []   Warm-up     # Mins:       []  Arm Ergometer      Huff:  # Mins:       [x]  NuStep: Intervals  Warm-up:4/5mins  Intervals: 5-6/ 5 mins each  Cool-Down     Workload  5-6  # Mins:  30 95 98 14 3   []  Recumbent Bike     Level: RPM: # Mins:       []  Bodyweight Exercises:     # Mins:       []  Resistance bands     Color Reps/Sets # Mins:       []  Free Weights       LBS:   Reps/Sets   #Mins:       One-one-One Activity: Pt was the only one being treated by RT during this time     Start:1000 Stop:1100 #Mins:60       Balance Work:  Step-ups:  Reps/Sets   #Mins       [id number]   4 74 60 88 2   Breathing Retraining    10 x 2 10 99 84 12 2   Recovery & Monitoring   [address], RT Cata Chawla, RT Cata Chawla, RT   Cata Chawla, RT Cata Chawla, RT

## 2021-03-08 ENCOUNTER — HOSPITAL ENCOUNTER (OUTPATIENT)
Dept: PULMONOLOGY | Age: 76
Discharge: HOME | End: 2021-03-08
Payer: MEDICARE

## 2021-03-08 VITALS — BODY MASS INDEX: 35 KG/M2 | WEIGHT: 237 LBS

## 2021-03-08 PROCEDURE — G0424 PULMONARY REHAB W EXER: HCPCS

## 2021-03-08 NOTE — PROGRESS NOTES
Vaughn Osman    Session Notes: pt arrived to the clinic late today     Visit # 24/36         Date: 3/8/2021         Time in: 1015         Time out: 1100         Modality    SpO2 % HR RPE RPD   []   Warm-up      # Mins:         []  Arm Ergometer      Huff:    # Mins:         [x]  NuStep: Intervals  Warm-up:  Intervals: 5/7 3:3min  Cool-Down     Workload  5-7  # Mins:  25 97 73 13 4   []  Recumbent Bike     Level: RPM: # Mins:         []  Bodyweight Exercises:     # Mins:           []  Resistance bands     Color   Reps/Sets # Mins:         []  Free Weights       LBS:     Reps/Sets   #Mins:         One-one-One Activity: Pt was the only one being treated by RT during this time     Start:   Stop: #Mins:    45       Balance Work:  Step-ups:    Reps/Sets     #Mins         [id number]   0 10 87 12 2   Breathing Retraining    10/0 10 97 84 12 2   Recovery & Monitoring   68 [facility] Nichol

## 2021-03-09 NOTE — PROGRESS NOTES
Summary Report    Pt reports having increased shortness of breath he feels is the result of his decrease in blood pressure medication. He currently smokes, he agreed take lozenges and gum but has to schedule an appointment. [facility]  Pulmonary Tx Plan  Description: Male : 1945   Pulmonary ITP     [place] PHASE II from 3/8/2021 in [place] [address]   Referral Date 21   Significant Cardiovascular History Chronic atrial fibrillation   Co-morbidities Diabetes   Stages of change  Maintenance   Oxygen Use No   ITP Visit Type Re-Assessment   1st Date of Exercise 20   ITP Next Review Date 21   Visit #/Total Visits    EF % 55 %   Pulmonary ITP Exercise, Psychosocial, Tobacco, Nutrition, Education   Stages of Change Action   Test Six minute walk test   Distance Walked in  ft   Distance Walked (ft) 18.5 ft   Peak HR 81   Peak /83   Peak RPE 14   Peak Mets 2.45   O2 Saturation 97-98   Stops 0   SPO2 Range 97-98   Mode Track, Treadmill, Bike, Stepper, Ergometer, Elliptical   Frequency per week 2   Duration per session one hour   Intensity  METS       2-4   Progression slow-moderate   O2 Flow Rate (L/min) 0 l/min   Symptoms with Exercise Shortness of breath   Target Heart Rate 112-128   Resistance Training Yes   Assisted Devices None   Resting /67   Peak /72   Is BP WDL?  Yes   Education Self pulse, Exercise safety, Home exercise plan, RPE scale, O2 therapy, RPE/RPD scale, Purse lip/Abdominal breathing   Target Goal(s) Individual exercise RX, BP < 140/90 or < 130/80, if DM or CKD, Aerobic activity 30 + minutes/day  5 days/week, Home activity   Stages of Change Preparation   Interventions No intervention indicated   Consults PCP   Currently Taking Psychotropic Meds No   Medication Changes No   Education Advanced directives, Coping techniques, Environmental triggers, Impact self care behaviors on health, Relaxation techniques, Signs/Symptoms of depression, Stress management, Support groups, Tobacco dangers, Traveling with lung disease   Target Goal(s) Assess presence or absence of depression using a valid screening tool, Maximizes coping skills, Positive support group   Uses Stress Mgmt Techniques Yes   Stages of Change Action   Tobacco Use Yes   # Cigarette Smoked/Day 10-12   Smokeless Tobacco Use No   Smoking Cessation Referral Yes   Resource Information Provided Yes   Target Goal Complete cessation of tobacco use   Patient Stated Tobacco Goals pt is willing to take gum or lozenges   Stages of Change Preparation   Diabetes Yes   HbA1C 7.5   HbA1C Date 12/14/20   BG at Home No   Diabetes Med(s) Metformin ER   Diabetes Med(s) Change No   HDL 37   LDL 85   Triglycerides 176   Height  5' 9\" (1.753 m)   Waist Circumference  48   Alcohol Weekly   Type boubon, Vokha   Amount 1-2 cups   Dietitian Consult No   Nurse/Patient Discussion Yes   Nutrition Class No   Lipid Clinic Referral No   Education Healthy eating, Nutrition and lung disease, Supplemental dietary needs, Special diet, Signs/Symptoms Hypo/Hyperglycemia, DM & CAD relationship   Learning Barrier Ready to learn   Education Schedule Given Yes   Education Tobacco triggers, Med Compliance, Pulmonary Medications, Breaking the Dyspnea Cycle, Pulmonary A&P, lung/gas exchange, Heart/Lung association, Activity of Daily Living, Nebulizer Use, Bronchial Hygiene/controlled cough, Signs/Symptoms Hypo/hyperglycemia, DM & lung disease   Target Goals Correct demonstration of breathing techniques, Correct demonstration of MDI use and care, Complete cessation of tobacco cessation         MD Signature: _______________________________________________        Date/Time:  _______________________________        Pharmacist Signature:_________________________________________        Date/Time: _______________________________

## 2021-03-10 ENCOUNTER — HOSPITAL ENCOUNTER (OUTPATIENT)
Dept: PULMONOLOGY | Age: 76
Discharge: HOME | End: 2021-03-10
Payer: MEDICARE

## 2021-03-10 PROCEDURE — G0424 PULMONARY REHAB W EXER: HCPCS

## 2021-03-10 NOTE — PROGRESS NOTES
Rio Rodríguez    Session Notes:       Visit # 25/36         Date: 03/10/21         Time in: 1000         Time out: 1100         Modality    SpO2 HR RPE RPD   []   Warm-up     # Mins:       []  Arm Ergometer      Huff:  # Mins:       [x]  NuStep: Intervals  Warm-up: 4  Intervals: 5-7       Workload  4-7  # Mins:  43 07 29 54 3   []  Recumbent Bike     Level: RPM: # Mins:       []  Bodyweight Exercises:     # Mins:       []  Resistance bands     Color Reps/Sets # Mins:       []  Free Weights       LBS:   Reps/Sets   #Mins:       One-one-One Activity: Pt was the only one being treated by RT during this time     Start:  1000 Stop:  1100 #Mins:  60       Balance Work:  Step-ups:  Reps/Sets   #Mins       Stretching   50 64 03 12 2   Breathing Retraining    10 x 2 10 98 71 11 1   Recovery & Monitoring   [address], RT [place], RT [place], RT   [place], RT [place], RT

## 2021-03-15 ENCOUNTER — HOSPITAL ENCOUNTER (OUTPATIENT)
Dept: PULMONOLOGY | Age: 76
Discharge: HOME | End: 2021-03-15
Payer: MEDICARE

## 2021-03-15 PROCEDURE — G0424 PULMONARY REHAB W EXER: HCPCS

## 2021-03-15 NOTE — PROGRESS NOTES
Timbo Hernandez    Session Notes:     Visit # 26/36         Date: 3/15/2021         Time in: 9922         Time out: 1100         Modality    SpO2 % HR RPE RPD   []   Warm-up      # Mins:         []  Arm Ergometer      Huff:    # Mins:         [x]  NuStep: Intervals  Warm-up:  Intervals:  Cool-Down     Workload  5-7  # Mins:  28 97 82 13 4   []  Recumbent Bike     Level: RPM: # Mins:         []  Bodyweight Exercises:     # Mins:           []  Resistance bands     Color   Reps/Sets # Mins:         []  Free Weights       LBS:     Reps/Sets   #Mins:         One-one-One Activity: Pt was the only one being treated by RT during this time     Start:   Stop: #Mins:    60       Balance Work:  Step-ups:    Reps/Sets     #Mins         Stretching          Breathing Retraining    10/2 10 97 88 12 2   Recovery & Monitoring   [address] [place] [address]

## 2021-03-17 ENCOUNTER — HOSPITAL ENCOUNTER (OUTPATIENT)
Dept: PULMONOLOGY | Age: 76
Discharge: HOME | End: 2021-03-17
Payer: MEDICARE

## 2021-03-17 VITALS — BODY MASS INDEX: 35.44 KG/M2 | WEIGHT: 240 LBS

## 2021-03-17 PROCEDURE — G0424 PULMONARY REHAB W EXER: HCPCS

## 2021-03-17 NOTE — PROGRESS NOTES
Jasbir Santiago    Session Notes:     Visit # 27/36         Date: 03/17/21         Time in: 1000         Time out: 1100         Modality    SpO2 HR RPE RPD   []   Warm-up     # Mins:       []  Arm Ergometer      Huff:  # Mins:       [x]  NuStep: Intervals  Warm-up:  Intervals:  Cool-Down     Workload  5-7  # Mins:  30 97 77 13 3   []  Recumbent Bike     Level: RPM: # Mins:       []  Bodyweight Exercises:     # Mins:       []  Resistance bands     Color Reps/Sets # Mins:       []  Free Weights       LBS:   Reps/Sets   #Mins:       One-one-One Activity: Pt was the only one being treated by RT during this time     Start: Stop: #Mins:       Balance Work:  Step-ups:  Reps/Sets   #Mins       Stretching          Breathing Retraining    10 x 2 10 98 61 13 3   Recovery & Monitoring   [address], RT Sheela Velasquez, RT Sheela Velasquez, RT   Sheela Velasquez, RT Sheela Velasquez, RT

## 2021-03-22 ENCOUNTER — HOSPITAL ENCOUNTER (OUTPATIENT)
Dept: PULMONOLOGY | Age: 76
Discharge: HOME | End: 2021-03-22
Payer: MEDICARE

## 2021-03-22 VITALS — WEIGHT: 240 LBS | BODY MASS INDEX: 35.44 KG/M2

## 2021-03-22 PROCEDURE — G0424 PULMONARY REHAB W EXER: HCPCS

## 2021-03-22 NOTE — PROGRESS NOTES
Yuan Coulter    Session Notes:     Visit # 28/36         Date: 3/22/2021         Time in: 5160         Time out: 1100         Modality    SpO2 % HR RPE RPD   []   Warm-up      # Mins:         []  Arm Ergometer      Huff:    # Mins:         [x]  NuStep: Intervals  Warm-up:  Intervals:  Cool-Down     Workload    # Mins:  30 46 80 21 5   []  Recumbent Bike     Level: RPM: # Mins:         []  Bodyweight Exercises:     # Mins:           []  Resistance bands     Color   Reps/Sets # Mins:         []  Free Weights       LBS:     Reps/Sets   #Mins:         One-one-One Activity: Pt was the only one being treated by RT during this time     Start:   Stop: #Mins:    60       Balance Work:  Step-ups:    Reps/Sets     #Mins         Stretching          Breathing Retraining   1250 10/2 10 97 85 12 1   Recovery & Monitoring   [address] [place] [address] Romel Kam

## 2021-03-23 RX ORDER — FUROSEMIDE 20 MG/1
TABLET ORAL
Qty: 90 TAB | Refills: 3 | Status: SHIPPED | OUTPATIENT
Start: 2021-03-23 | End: 2022-02-11

## 2021-03-24 ENCOUNTER — HOSPITAL ENCOUNTER (OUTPATIENT)
Dept: PULMONOLOGY | Age: 76
Discharge: HOME | End: 2021-03-24
Payer: MEDICARE

## 2021-03-24 VITALS — WEIGHT: 240 LBS | BODY MASS INDEX: 35.44 KG/M2

## 2021-03-24 PROCEDURE — G0424 PULMONARY REHAB W EXER: HCPCS

## 2021-03-24 NOTE — PROGRESS NOTES
Nadia Guerrier    Session Notes:     Visit # 88/43         Date: 3/4/2021         Time in: 1005         Time out: 1100         Modality    SpO2 % HR RPE RPD   []   Warm-up      # Mins:         []  Arm Ergometer      Huff:    # Mins:         [x]  NuStep: Intervals  Warm-up:5  Intervals:5,5,6Cool-Down     Workload  5-7  # Mins:  72 98 27 46 2   []  Recumbent Bike     Level: RPM: # Mins:         []  Bodyweight Exercises:     # Mins:           []  Resistance bands     Color   Reps/Sets # Mins:         []  Free Weights       LBS:     Reps/Sets   #Mins:         One-one-One Activity: Pt was the only one being treated by RT during this time     Start:   Stop: #Mins:    55       Balance Work:  Step-ups:    Reps/Sets     #Mins         Stretching   51 31 16 12 1   Breathing Retraining     10 97 72 12 2   Recovery & Monitoring   [address] [facility] Irma Lyn

## 2021-03-31 ENCOUNTER — HOSPITAL ENCOUNTER (OUTPATIENT)
Dept: PULMONOLOGY | Age: 76
Discharge: HOME | End: 2021-03-31
Payer: MEDICARE

## 2021-03-31 PROCEDURE — G0424 PULMONARY REHAB W EXER: HCPCS

## 2021-03-31 NOTE — PROGRESS NOTES
Deon Host    Session Notes:     Visit # 17/24         Date: 03/31/2021         Time in: 1000         Time out: 1100         Modality    SpO2 HR RPE RPD   []   Warm-up     # Mins:       []  Arm Ergometer      Huff:  # Mins:       [x]  NuStep: Intervals  Warm-up: 4/5mins  Intervals:  Cool-Down     Workload  5-7  # Mins:  32 94 64 14 4   []  Recumbent Bike     Level: RPM: # Mins:       []  Bodyweight Exercises:     # Mins:       []  Resistance bands     Color Reps/Sets # Mins:       []  Free Weights       LBS:   Reps/Sets   #Mins:       One-one-One Activity: Pt was the only one being treated by RT during this time     Start: Stop: #Mins:       Balance Work:  Step-ups:  Reps/Sets   #Mins       Stretching          Breathing Retraining    10 x 2 10 98 92 12 2   Recovery & Monitoring   [address], RT Eriberto Churchill, RT Eriberto Churchill, RT   Eriberto Churchill, RT Eriberto Churchill, RT

## 2021-04-02 ENCOUNTER — HOSPITAL ENCOUNTER (OUTPATIENT)
Dept: PULMONOLOGY | Age: 76
Discharge: HOME | End: 2021-04-02
Payer: MEDICARE

## 2021-04-02 VITALS — WEIGHT: 240 LBS | BODY MASS INDEX: 35.44 KG/M2

## 2021-04-02 PROCEDURE — G0424 PULMONARY REHAB W EXER: HCPCS

## 2021-04-02 NOTE — PROGRESS NOTES
Inocente Yoder    Session Notes:  Pt states he's had a change to his blood pressure medication two days ago     Visit # 31/36         Date: 4/2/2021         Time in: 1400         Time out: 1500         Modality    SpO2 % HR RPE RPD   []   Warm-up      # Mins:         []  Arm Ergometer      Huff:    # Mins:         [x]  NuStep: Intervals  Warm-up:  Intervals:  Cool-Down     Workload  6-8  # Mins:  30 97 103 14 4-5   []  Recumbent Bike     Level: RPM: # Mins:         []  Bodyweight Exercises:     # Mins:           []  Resistance bands     Color   Reps/Sets # Mins:         []  Free Weights       LBS:     Reps/Sets   #Mins:         One-one-One Activity: Pt was the only one being treated by RT during this time     Start:   Stop: #Mins:    60       Balance Work:  Step-ups:    Reps/Sets     #Mins         Stretching   29 11 45 11 1   Breathing Retraining     10 97 72 10 0   Recovery & Monitoring   5 97 72 11 [facility]

## 2021-04-05 ENCOUNTER — HOSPITAL ENCOUNTER (OUTPATIENT)
Dept: PULMONOLOGY | Age: 76
Discharge: HOME | End: 2021-04-05
Payer: MEDICARE

## 2021-04-05 VITALS — BODY MASS INDEX: 35.59 KG/M2 | WEIGHT: 241 LBS

## 2021-04-05 PROCEDURE — G0424 PULMONARY REHAB W EXER: HCPCS

## 2021-04-05 NOTE — PROGRESS NOTES
Summary Report  We discussed pt's planning for pt maintenance after graduation from [employer].  Will send pt home with exercise suggestions and resources. Smoking cessation was suggested however he's not interested at this time     Miguel Fairchild  Pulmonary Tx Plan  Description: Male : 1945   Pulmonary ITP     AR PHASE II from 2021 in [place] [address]   Referral Date 21   Significant Cardiovascular History Chronic atrial fibrillation   Co-morbidities Diabetes   Stages of change  Maintenance   Oxygen Use No   ITP Visit Type Re-Assessment   1st Date of Exercise 20   ITP Next Review Date 21   Visit #/Total Visits    EF % 55 %   Pulmonary ITP Exercise, Psychosocial, Tobacco, Nutrition, Education   Stages of Change Action   Test Six minute walk test   Distance Walked in  ft   Distance Walked (ft) 1 ft   Peak HR 81   Peak /83   Peak RPE 14   Peak Mets 2.45   O2 Saturation 97-98   Stops 00   SPO2 Range 97-98   Mode Track, Treadmill, Bike, Stepper, Ergometer, Elliptical   Frequency per week 2   Duration per session one hour   Intensity  METS       2-4   Progression slow-moderate   O2 Flow Rate (L/min) 0 l/min   Symptoms with Exercise Shortness of breath   Target Heart Rate 112-128   Resistance Training Yes   Assisted Devices None   Resting /62   Peak /61   Is BP WDL?  Yes   Type resistance   Education Self pulse, Exercise safety, Blood pressure medications, RPE scale, Equipment orientation, Understand blood pressure, Warm up/Cool down, Energy conservation, Home exercise plan, O2 therapy, RPE/RPD scale   Target Goal(s) Individual exercise RX, BP < 140/90 or < 130/80, if DM or CKD, Aerobic activity 30 + minutes/day  5 days/week, Home activity   Stages of Change Preparation   Interventions No intervention indicated   Consults PCP   Currently Taking Psychotropic Meds No   Medication Changes No   Education Advanced directives, Coping techniques, Environmental triggers, Impact self care behaviors on health, Relaxation techniques, Signs/Symptoms of depression, Stress management, Support groups, Tobacco dangers, Traveling with lung disease   Target Goal(s) Assess presence or absence of depression using a valid screening tool, Maximizes coping skills, Positive support group   Uses Stress Mgmt Techniques Yes   Patient Stated Psychosocial Goals pt states he has no stressors   Stages of Change Action   Tobacco Use Yes   # Cigarette Smoked/Day 10-12   Smokeless Tobacco Use No   Smoking Cessation Referral Yes   Resource Information Provided Yes   Target Goal Complete cessation of tobacco use   Patient Stated Tobacco Goals pt is willing to take gum or lozenges   Stages of Change Preparation   Diabetes Yes   HbA1C 7.5   HbA1C Date 12/04/20   BG at Home No   Diabetes Med(s) Metformin ER   Diabetes Med(s) Change No   HDL 37   LDL 85   Triglycerides 176   Weight  106.6 kg (235 lb)   Height  5' 9\" (1.753 m)   BMI 34.78   Waist Circumference  48   Alcohol Weekly   Type boubon, Vokha   Amount 1-2 cups   Dietitian Consult No   Nutrition Class No   Diabetes Education Referral No   Lipid Clinic Referral No   Education Healthy eating, High fiber diet, Nutrition and lung disease, Supplemental dietary needs, Special diet, DM & CAD relationship   Learning Barrier Ready to learn   Education Schedule Given Yes   Education Tobacco triggers, Risk factors, Cardiac A&P, Pulmonary Medications, Breaking the Dyspnea Cycle, Pulmonary A&P, lung/gas exchange, Heart/Lung association, Activity of Daily Living, Nebulizer Use   Target Goals Correct demonstration of breathing techniques, Correct demonstration of MDI use and care, Complete cessation of tobacco cessation         MD Signature: _______________________________________________        Date/Time:  _______________________________        Pharmacist Signature:_________________________________________        Date/Time: _______________________________

## 2021-04-05 NOTE — PROGRESS NOTES
Barney Castro   Session Notes: We discussed pt's planning for pt maintenance after graduation from [employer].   Will send pt home with exercise suggestions and tools      Visit # 32/36         Date: 4/5/2021         Time in: 1015         Time out: 1100         Modality    SpO2 % HR RPE RPD   [x]   Hallway Walk      # Mins:  10 97 75 13 3   []  Arm Ergometer      Huff:    # Mins:         [x]  NuStep: Intervals  Warm-up:  Intervals:  Cool-Down     Workload  5-7  # Mins:  20 97 75 13 3   []  Recumbent Bike     Level: RPM: # Mins:         []  Bodyweight Exercises:     # Mins:           []  Resistance bands     Color   Reps/Sets # Mins:         []  Free Weights       LBS:     Reps/Sets   #Mins:         One-one-One Activity: Pt was the only one being treated by RT during this time     Start:   Stop: #Mins:    45       Balance Work:  Step-ups:    Reps/Sets     #Mins         Stretching          Breathing Retraining     10 96 72 12 2   Recovery & Monitoring   [facility]

## 2021-04-09 ENCOUNTER — HOSPITAL ENCOUNTER (OUTPATIENT)
Dept: PULMONOLOGY | Age: 76
Discharge: HOME | End: 2021-04-09
Payer: MEDICARE

## 2021-04-09 VITALS — WEIGHT: 240 LBS | BODY MASS INDEX: 35.44 KG/M2

## 2021-04-09 PROCEDURE — G0424 PULMONARY REHAB W EXER: HCPCS

## 2021-04-09 NOTE — PROGRESS NOTES
Sophia Padilla    Session Notes:     Visit # 27/07         Date: 04/09/21         Time in: 1400         Time out: 1500         Modality    SpO2 HR RPE RPD   []   Warm-up     # Mins:       []  Arm Ergometer      Huff:  # Mins:       [x]  NuStep: Intervals  Warm-up:4/5 mins  Intervals: 4-8  Cool-Down     Workload:  4-7  # Mins:  30 98 86 13 3   []  Recumbent Bike     Level: RPM: # Mins:       []  Bodyweight Exercises:     # Mins:       []  Resistance bands     Color Reps/Sets # Mins:       []  Free Weights       LBS:   Reps/Sets   #Mins:       One-one-One Activity: Pt was the only one being treated by RT during this time     Start:  1400 Stop:  1500 #Mins:  60       Balance Work:  Step-ups:  Reps/Sets   #Mins       Stretching          Breathing Retraining     77 73 14 12 2   Recovery & Monitoring   [address], RT Arlene Apodaca, RT Arlene Apodaca, RT   Arlene Apodaca, RT Arlene Apodaca, RT

## 2021-04-12 ENCOUNTER — HOSPITAL ENCOUNTER (OUTPATIENT)
Dept: PULMONOLOGY | Age: 76
Discharge: HOME | End: 2021-04-12
Payer: MEDICARE

## 2021-04-12 VITALS — WEIGHT: 240 LBS | BODY MASS INDEX: 35.44 KG/M2

## 2021-04-12 PROCEDURE — G0424 PULMONARY REHAB W EXER: HCPCS

## 2021-04-12 NOTE — PROGRESS NOTES
Rebeka Severe    Session Notes:     Visit # 28/55         Date: 4/12/2021         Time in: 5844         Time out: 1100         Modality    SpO2 % HR RPE RPD   []   Warm-up      # Mins:         []  Arm Ergometer      Huff:    # Mins:         [x]  NuStep: Intervals  Warm-up:  Intervals:4-6 2 min ea  Cool-Down     Workload  4-6  # Mins:  30 97 105 12 2   []  Recumbent Bike     Level: RPM: # Mins:         []  Bodyweight Exercises:     # Mins:           []  Resistance bands     Color   Reps/Sets # Mins:         []  Free Weights       LBS:     Reps/Sets   #Mins:         One-one-One Activity: Pt was the only one being treated by RT during this time     Start:   Stop: #Mins:    45       Balance Work:  Step-ups:    Reps/Sets     #Mins         Stretching          Breathing Retraining   1200 10/2 10 98 101 12 2   Recovery & Monitoring   [facility]

## 2021-04-14 ENCOUNTER — HOSPITAL ENCOUNTER (OUTPATIENT)
Dept: PULMONOLOGY | Age: 76
Discharge: HOME | End: 2021-04-14
Payer: MEDICARE

## 2021-04-14 VITALS — BODY MASS INDEX: 35.74 KG/M2 | WEIGHT: 242 LBS

## 2021-04-14 PROCEDURE — G0424 PULMONARY REHAB W EXER: HCPCS

## 2021-04-14 NOTE — PROGRESS NOTES
Deon Host    Session Notes:     Visit # 54/53         Date: 04/14/2021         Time in: 1000         Time out: 1100         Modality    SpO2 HR RPE RPD   []   Warm-up     # Mins:       []  Arm Ergometer      Huff:  # Mins:       [x]  NuStep: Intervals  Warm-up: 4/5mins  Intervals:      Workload:  4-6      # Mins:  20 98 96 13 3   []  Recumbent Bike     Level: RPM: # Mins:       []  Bodyweight Exercises:     # Mins:       []  Resistance bands     Color Reps/Sets # Mins:       []  Free Weights       LBS:   Reps/Sets   #Mins:       One-one-One Activity: Pt was the only one being treated by RT during this time     Start: Stop: #Mins:       Balance Work:  Step-ups:  Reps/Sets   #Mins       Stretching          Breathing Retraining     10 98 86 12 2   Recovery & Monitoring   [address] Vitaliy Smith, RT Eriberto Churchill, RT Eriberto Churchill, RT   Eriberto Churchill, RT Eriberto Churchill, RT

## 2021-04-19 ENCOUNTER — HOSPITAL ENCOUNTER (OUTPATIENT)
Dept: PULMONOLOGY | Age: 76
Discharge: HOME | End: 2021-04-19
Payer: MEDICARE

## 2021-04-19 PROCEDURE — G0424 PULMONARY REHAB W EXER: HCPCS

## 2021-04-19 NOTE — PROGRESS NOTES
Lindsay HANSON  Pulmonary Tx Plan  Description: Male : 1945       Discharge Pulmonary ITP  Pt completed the pulmonary rehabilitation program today      MD PHASE II from 2021 in [place] [address]   Referral Date 21   Significant Cardiovascular History Chronic atrial fibrillation   Co-morbidities Diabetes   Stages of change  Maintenance   Oxygen Use No   ITP Visit Type Discharge, completed program   1st Date of Exercise 20   ITP Next Review Date    Visit #/Total Visits 36/36   Pulmonary ITP Exercise, Psychosocial, Tobacco, Nutrition, Education   Stages of Change Action   Test Six minute walk test   Distance Walked in  ft   Distance Walked (ft) 1091 ft   Peak    Peak /61   Peak RPE 16   Peak Mets 2.6   O2 Saturation 96-97   Stops 1   SPO2 Range 96-97   Mode Track, Treadmill, Bike, Stepper, Ergometer, Elliptical   Frequency per week 2   Duration per session one hour   Intensity  METS       2-4   Progression slow-moderate   O2 Flow Rate (L/min) 0 l/min   Symptoms with Exercise Shortness of breath   Target Heart Rate 112-128   Resistance Training Yes   Assisted Devices None   Resting /68   Peak /61   Is BP WDL?  Yes   Education Self pulse, Exercise safety, Blood pressure medications, RPE scale, Equipment orientation, Understand blood pressure, RPE/RPD scale, O2 therapy, Energy conservation, Home exercise plan   Target Goal(s) Individual exercise RX, BP < 140/90 or < 130/80, if DM or CKD, Aerobic activity 30 + minutes/day  5 days/week, Home activity   Stages of Change Preparation   Interventions No intervention indicated   Consults PCP   Currently Taking Psychotropic Meds No   Medication Changes No   Education Advanced directives, Coping techniques, Environmental triggers, Impact self care behaviors on health, Relaxation techniques, Signs/Symptoms of depression, Stress management, Support groups, Tobacco dangers, Traveling with lung disease   Target Goal(s) Assess presence or absence of depression using a valid screening tool, Maximizes coping skills, Positive support group   Uses Stress Mgmt Techniques Yes   Patient Stated Psychosocial Goals pt states he has no stressors   Stages of Change Action   Tobacco Use Yes   Date Started --  [years ago]   # Cigarette Smoked/Day 10-12   Smokeless Tobacco Use No   Smoking Cessation Referral Yes   Resource Information Provided Yes   Stages of Change Preparation   Diabetes Yes   HbA1C 7.5   HbA1C Date 12/04/20   BG at Home No   Diabetes Med(s) Metformin ER   Diabetes Med(s) Change No   HDL 37   LDL 85   Triglycerides 176   Weight  108.9 kg (240 lb)   Height  5' 9\" (1.753 m)   BMI 35.52   Waist Circumference  48   Alcohol Weekly   Type boubon, Vokha   Amount 1-2 cups   Rate Your Plate Total Score 30   Dietitian Consult No   Nurse/Patient Discussion Yes   Nutrition Class No   Diabetes Education Referral No   Lipid Clinic Referral No   Education Nutrition and lung disease, Healthy eating, Low sodium diet, Low fat & cholesterol diet, Signs/Symptoms Hypo/Hyperglycemia, DM & CAD relationship, Supplemental dietary needs   Learning Barrier Ready to learn   Education Schedule Given Yes   Education Tobacco triggers, Med Compliance, Breaking the Dyspnea Cycle, Pulmonary A&P, lung/gas exchange, Heart/Lung association, Activity of Daily Living, Nebulizer Use   Target Goals Correct demonstration of breathing techniques, Correct demonstration of MDI use and care, Complete cessation of tobacco cessation         MD Signature: _______________________________________________        Date/Time:  _______________________________        Pharmacist Signature:_________________________________________        Date/Time: _______________________________

## 2021-04-19 NOTE — PROGRESS NOTES
Aquiles Le 70y.o. year old male with Chronic obstructive pulmonary disease, unspecified [J44.9]          Social  History & Family Component     Living Situation:       LIVES: Wife, daughter and two  Does patient have family/friends able to provide support  [x]? Yes  []? No      What type of home does patient live in? [x]? one level   []?   one level with basement      []? 2 story    # of stairs to enter home:   3  # of stairs in home:   0  []? other:     Does patient have a yard? []? No   [x]? Yes               If yes, can patient care for yard? [x]? Yes [x]? No       yard work ( pine cones occasional)                 If no, does patient require assistance to care for yard? [x]? Yes   []? No      Transportation Resources:  Can patient drive themselves? [x]? Yes   []? No              If no, who is principle ? Does patient need referral for community resources (i.e., [place]ae [place], etc.)                 []?  No  []? Yes, which option?          Services/[place]/Visual Impaired: []? Yes   [x]?    No   If yes, describe:     Occupation: Retired for seven years, now [facility] [facility] part time [employer] worker  Education/Grade Level:      Occupational exposures: dirt, gas fumes from the dealership garage     longterm/disability date: Retired 2013 from [employer] now works there part time for the same [address]     COPD Assessment Test (CAT) Score: 28  Date of Test: 4/19/2021  Impact Level: high     CAT measures COPD health status and quantifies the impact of COPD on patients life  and how this changes over time  Score and Impact Level: >30 = very high; > 20 = high; 10 - 20 = medium; <10 = low     PHQ-9 Score: 7  Date of Test: 4/19/2021     Scale includes 9 depression questions including 1 suicidal ideation and a 10th question asking about work, home, and socialization  Each item on the questionnaire is scored from 0-3 and this means that a person can score between 0 and 27 for depression. Results5-9 minimal symptoms, 10-14 minor depression, 15-19 major depression, moderately severe, >20 major depression, severe.      Assessment:     Limited participation in family and community activities []? No [x]? Yes       Limited coping strategies [x]? No []? Yes       Inappropriate over dependence on family and friends [x]? No []? Yes       Stressors [x]? No [x]? Yes  If yes, please describe:      Able to relax or perform leisure activities [x]? No []? Yes   Describe leisure activities: none     Able to participate in former interests/hobbies: [x]? No []? Yes   Describe hobbies:    none     Able to do yard/household activities: []? No [x]? Yes   Describe activities: rack the yard   What is most difficult household chore\" pickup pine cones  What is easiest household chore?: washing dishes     Current mood assessed: []? No [x]? Yes   Description of present mood: []? Worried  []? Sad []? []? Depressed []? Impatient  []? Frustrated  []? Anxious  []? Contented  [x]? Cheerful  []? Happy []? Other:              Needs referral to referring MD for follow up: [x]? No []? Yes        Aspects of family & home situation that may impact progress in Pulmonary Rehabilitation:       Barrier(s) to learning:  none      [x]? Family & home situation will enhance Pulmonary Rehabilitation, patient is a good candidate      _____________________________________________________________________  Geri Jha  PLAN:     Discharge          Patient name: Monie Strong : 1945                 Goals Comments   1. Squatting to do garden work for up to 10-15 minutes at a time     []? initial  []? met                             []? not met  []? progressing  [x]? N/A  he states he hasn't attempted   2. Play nine holes of golf    []? initial  []? met                               []? not met  []? progressing  [x]?  N/A  he states he hasn't attempted   3  Walk 20 yards without becoming short of breath       []? initial  [x]? met                           []? not met  []?  progressing He feels he's not as short of breath now but understands current smoking limits his progress                    Sirena Vaughn 4/19/2021 at 10:23 A.m

## 2021-04-19 NOTE — PROGRESS NOTES
COPD Assessment Tool (CAT)     0-5           I never cough/I cough all the time       Score:2      I have no phlegm in my chest/ My chest is completely full of phlegm  Score:5      My chest does not feel tight at all/ My chest feels very tight   Score:3      When I walk up a hill or stairs I am not breathless/   When I walk up a hill or stairs I am very breathless    Score:5      I am not limited doing any activities at home/   I am very limited doing activities at home     Score:5      I am confident leaving my home despite my lung condition/  I am not at all confident leaving my home because of my lung condition Score:1    I sleep soundly/ I don't sleep because of my lung condition   Score:3      I have lots of energy/ I have no energy at all     Score:4                   Total:28

## 2021-04-19 NOTE — PROGRESS NOTES
Pulmonary Rehab Routine Discharge Psychosocial Note     Program Completed: Edel Sherwood 78 y.o. has completed 36/36 sessions of Pulmonary Rehab. He had good attendance and put forth good effort with all activities. He has completed his six minute walk test, walking 73 ft further today than on day of evaluation. He does not require supplemental oxygen with exercise. He is not tobacco free, he admits that smoking and lack of a home exercise regimen places limits of progress. Lourdes Severe is not active in his community. He does have a good support system. He lives with his wife and grandchildren. Lourdes Severe has not enrolled in our maintenance program.  He is encouraged to incorporate some form of home exercise at least three days per week. He reports that he does feel Pulmonary Rehab has helped and the most significant change is decreased shortness of breath while doing yard work after 10-15 minutes.

## 2021-04-21 ENCOUNTER — APPOINTMENT (OUTPATIENT)
Dept: PULMONOLOGY | Age: 76
End: 2021-04-21
Payer: MEDICARE

## 2021-06-01 DIAGNOSIS — I35.0 AORTIC VALVE STENOSIS, ETIOLOGY OF CARDIAC VALVE DISEASE UNSPECIFIED: ICD-10-CM

## 2021-06-08 LAB
ECHO AO ROOT DIAM: 3.86 CM
ECHO AV AREA PEAK VELOCITY: 0.54 CM2
ECHO AV AREA VTI: 0.51 CM2
ECHO AV AREA/BSA PEAK VELOCITY: 0.2 CM2/M2
ECHO AV AREA/BSA VTI: 0.2 CM2/M2
ECHO AV MEAN GRADIENT: 31.18 MMHG
ECHO AV PEAK GRADIENT: 48.69 MMHG
ECHO AV PEAK VELOCITY: 348.33 CM/S
ECHO AV VTI: 74.94 CM
ECHO LA AREA 4C: 40.95 CM2
ECHO LA VOL 4C: 149.64 ML (ref 18–58)
ECHO LA VOLUME INDEX A4C: 66.8 ML/M2 (ref 16–28)
ECHO LV INTERNAL DIMENSION DIASTOLIC: 4.26 CM (ref 4.2–5.9)
ECHO LV INTERNAL DIMENSION SYSTOLIC: 2.42 CM
ECHO LV IVSD: 1.41 CM (ref 0.6–1)
ECHO LV MASS 2D: 235.3 G (ref 88–224)
ECHO LV MASS INDEX 2D: 105.1 G/M2 (ref 49–115)
ECHO LV POSTERIOR WALL DIASTOLIC: 1.44 CM (ref 0.6–1)
ECHO LVOT DIAM: 2.11 CM
ECHO LVOT PEAK GRADIENT: 1.14 MMHG
ECHO LVOT PEAK VELOCITY: 53.37 CM/S
ECHO LVOT SV: 38.1 ML
ECHO LVOT VTI: 10.87 CM
LVOT MG: 0.7 MMHG

## 2021-06-08 NOTE — PROGRESS NOTES
Per your last note\" Moderate aortic valve stenosis. This has remained in the moderate range for the past 2 years. He had a mean valve gradient invasively in 2018 of 25 mmHg. His last echocardiogram was in June 2020 which showed a similar gradient. I have recommended a repeat echocardiogram in June of this year for comparison.

## 2021-06-09 ENCOUNTER — TELEPHONE (OUTPATIENT)
Dept: CARDIOLOGY CLINIC | Age: 76
End: 2021-06-09

## 2021-06-09 NOTE — TELEPHONE ENCOUNTER
----- Message from Femi Hsu MD sent at 6/9/2021  8:38 AM EDT -----  Please let the patient know that his aortic valve stenosis remains in the moderate range. I would continue to reassess this on an annual basis.  ----- Message -----  From: Logan Wan  Sent: 6/8/2021   2:33 PM EDT  To: Femi Hsu MD    Per your last note\" Moderate aortic valve stenosis. This has remained in the moderate range for the past 2 years. He had a mean valve gradient invasively in 2018 of 25 mmHg. His last echocardiogram was in June 2020 which showed a similar gradient. I have recommended a repeat echocardiogram in June of this year for comparison.

## 2021-07-09 ENCOUNTER — OFFICE VISIT (OUTPATIENT)
Dept: CARDIOLOGY CLINIC | Age: 76
End: 2021-07-09
Payer: MEDICARE

## 2021-07-09 VITALS
BODY MASS INDEX: 34.07 KG/M2 | DIASTOLIC BLOOD PRESSURE: 60 MMHG | OXYGEN SATURATION: 97 % | WEIGHT: 230 LBS | SYSTOLIC BLOOD PRESSURE: 92 MMHG | HEIGHT: 69 IN | HEART RATE: 78 BPM

## 2021-07-09 DIAGNOSIS — I35.0 AORTIC VALVE STENOSIS, ETIOLOGY OF CARDIAC VALVE DISEASE UNSPECIFIED: Primary | ICD-10-CM

## 2021-07-09 DIAGNOSIS — I48.19 PERSISTENT ATRIAL FIBRILLATION (HCC): ICD-10-CM

## 2021-07-09 DIAGNOSIS — E78.5 HYPERLIPIDEMIA, UNSPECIFIED HYPERLIPIDEMIA TYPE: ICD-10-CM

## 2021-07-09 DIAGNOSIS — I25.10 CORONARY ARTERY DISEASE INVOLVING NATIVE CORONARY ARTERY OF NATIVE HEART WITHOUT ANGINA PECTORIS: ICD-10-CM

## 2021-07-09 DIAGNOSIS — I10 ESSENTIAL HYPERTENSION: ICD-10-CM

## 2021-07-09 DIAGNOSIS — J44.9 CHRONIC OBSTRUCTIVE PULMONARY DISEASE, UNSPECIFIED COPD TYPE (HCC): ICD-10-CM

## 2021-07-09 PROCEDURE — G8427 DOCREV CUR MEDS BY ELIG CLIN: HCPCS | Performed by: INTERNAL MEDICINE

## 2021-07-09 PROCEDURE — 3017F COLORECTAL CA SCREEN DOC REV: CPT | Performed by: INTERNAL MEDICINE

## 2021-07-09 PROCEDURE — G8510 SCR DEP NEG, NO PLAN REQD: HCPCS | Performed by: INTERNAL MEDICINE

## 2021-07-09 PROCEDURE — G8754 DIAS BP LESS 90: HCPCS | Performed by: INTERNAL MEDICINE

## 2021-07-09 PROCEDURE — 1101F PT FALLS ASSESS-DOCD LE1/YR: CPT | Performed by: INTERNAL MEDICINE

## 2021-07-09 PROCEDURE — G8752 SYS BP LESS 140: HCPCS | Performed by: INTERNAL MEDICINE

## 2021-07-09 PROCEDURE — G8417 CALC BMI ABV UP PARAM F/U: HCPCS | Performed by: INTERNAL MEDICINE

## 2021-07-09 PROCEDURE — G8536 NO DOC ELDER MAL SCRN: HCPCS | Performed by: INTERNAL MEDICINE

## 2021-07-09 PROCEDURE — 93000 ELECTROCARDIOGRAM COMPLETE: CPT | Performed by: INTERNAL MEDICINE

## 2021-07-09 PROCEDURE — 99214 OFFICE O/P EST MOD 30 MIN: CPT | Performed by: INTERNAL MEDICINE

## 2021-07-09 NOTE — PROGRESS NOTES
HISTORY OF PRESENT ILLNESS  Hansa Nguyen is a 78 y.o. male. Follow-up  Associated symptoms include shortness of breath. Pertinent negatives include no chest pain, no abdominal pain and no headaches. Shortness of Breath  Pertinent negatives include no fever, no headaches, no cough, no wheezing, no PND, no orthopnea, no chest pain, no vomiting, no abdominal pain, no rash, no leg swelling and no claudication. Dizziness  Associated symptoms include shortness of breath. Pertinent negatives include no chest pain, no abdominal pain and no headaches. Patient presents for a follow-up office visit. He was previously followed by Dr. Tessie Lane up until his half-way at the end of last year. He has a past medical history significant for persistent atrial fibrillation, moderate nonobstructive coronary disease and moderate aortic valve stenosis. He is also a longtime smoker with a 50-pack-year smoking history still smoking half a pack of cigarettes a day. As result he does have significant COPD. He was last seen in the office 6 months ago. He underwent a repeat echocardiogram in June 2021 which showed preserved LV function, EF 55 to 60% with moderate aortic valve stenosis and a mean valve gradient 31 mmHg. This was a slight increase compared to the year prior where his mean gradient was 25 mmHg. His last cardiac catheterization was in 2018 which demonstrated moderate nonobstructive CAD involving his left main and mid LAD along with his mid left circumflex. His mean gradient was 25 mmHg at that time. We attempted to decrease his metoprolol XL dosage down to 50 mg daily at last visit, however, he did not tolerate this due to likely increased atrial fibrillation rates. He has been complaining of episodic dizziness and diaphoresis but not particular with exertion. He does have chronic exertional dyspnea which has not changed in severity. He denies any exertional chest pain or tightness.   He states his blood pressure is often low when he checks it. Past Medical History:   Diagnosis Date    Atrial fibrillation ([facility] 1998    CAD (coronary artery disease)     Cardiac nuclear imaging test, normal 04/01/2016    Low risk. No ischemia or prior infarction. EF 52%. Neg EKG on pharm stress test.    Diabetes ([facility]     DM (diabetes mellitus) ([facility]     GERD (gastroesophageal reflux disease)     Hematuria 2001    HLD (hyperlipidemia)     HTN (hypertension)     HTN (hypertension)     Hypothyroid     Pancreatitis      Current Outpatient Medications   Medication Sig Dispense Refill    furosemide (LASIX) 20 mg tablet TAKE 1 TABLET BY MOUTH  DAILY 90 Tab 3    isosorbide mononitrate ER (IMDUR) 60 mg CR tablet TAKE 1 TABLET BY MOUTH IN  THE MORNING 90 Tab 3    metoprolol succinate (TOPROL-XL) 100 mg tablet Take 1 Tab by mouth daily. 90 Tab 3    glimepiride (AMARYL) 4 mg tablet       fluticasone-umeclidinium-vilanterol (Trelegy Ellipta) 100-62.5-25 mcg inhaler Take 1 Puff by inhalation daily. 2 Inhaler 0    nitroglycerin (NITROSTAT) 0.4 mg SL tablet 1 Tab by SubLINGual route every five (5) minutes as needed for Chest Pain. 1 Bottle 3    metFORMIN ER (GLUCOPHAGE XR) 500 mg tablet 500 mg. Taking 2 tablets daily      aspirin delayed-release 81 mg tablet 1 tablet      warfarin (COUMADIN) 5 mg tablet Take 1 tablet by mouth once a day      levothyroxine (SYNTHROID) 150 mcg tablet Take 150 mcg by mouth daily.  albuterol (PROVENTIL HFA, VENTOLIN HFA, PROAIR HFA) 90 mcg/actuation inhaler Take 2 Puffs by inhalation every six (6) hours as needed for Wheezing.  MULTIVIT-MINERALS/FOLIC ACID (ONE DAILY GUMMY VITES PO) Take  by mouth daily.  pantoprazole (PROTONIX) 40 mg tablet Take 40 mg by mouth daily.  simvastatin (ZOCOR) 40 mg tablet Take 40 mg by mouth daily.  cholecalciferol, vitamin D3, 2,000 unit tab Take 1 Tab by mouth daily.        No Known Allergies     Social History     Tobacco Use  Smoking status: Current Every Day Smoker     Packs/day: 1.50     Years: 55.00     Pack years: 82.50    Smokeless tobacco: Never Used    Tobacco comment: 10 cigarettes per day lately   Substance Use Topics    Alcohol use: Yes     Comment: every other day    Drug use: No     Family History   Problem Relation Age of Onset    Diabetes Mother     Diabetes Father     Stroke Father          Review of Systems   Constitutional: Negative for chills, fever and weight loss. HENT: Negative for nosebleeds. Eyes: Negative for blurred vision and double vision. Respiratory: Positive for shortness of breath. Negative for cough and wheezing. Cardiovascular: Negative for chest pain, palpitations, orthopnea, claudication, leg swelling and PND. Gastrointestinal: Negative for abdominal pain, heartburn, nausea and vomiting. Genitourinary: Negative for dysuria and hematuria. Musculoskeletal: Negative for falls and myalgias. Skin: Negative for rash. Neurological: Positive for dizziness. Negative for focal weakness and headaches. Endo/Heme/Allergies: Does not bruise/bleed easily. Psychiatric/Behavioral: Negative for substance abuse. Visit Vitals  BP 92/60 (BP 1 Location: Left upper arm, BP Patient Position: Sitting, BP Cuff Size: Adult)   Pulse 78   Ht 5' 9\" (1.753 m)   Wt 104.3 kg (230 lb)   SpO2 97%   BMI 33.97 kg/m²         Physical Exam  Constitutional:       Appearance: He is well-developed. HENT:      Head: Normocephalic and atraumatic. Eyes:      Conjunctiva/sclera: Conjunctivae normal.   Neck:      Vascular: No carotid bruit or JVD. Cardiovascular:      Rate and Rhythm: Normal rate. Rhythm irregularly irregular. Pulses: Normal pulses. Heart sounds: S1 normal and S2 normal. Heart sounds are distant. Murmur heard. Harsh midsystolic murmur is present with a grade of 3/6 at the upper right sternal border radiating to the neck. No gallop. No S3 sounds.     Pulmonary:      Breath sounds: Decreased breath sounds present. No wheezing, rhonchi or rales. Abdominal:      General: Bowel sounds are normal.      Palpations: Abdomen is soft. Tenderness: There is no abdominal tenderness. Musculoskeletal:         General: No swelling, tenderness or deformity. Cervical back: Neck supple. Skin:     General: Skin is warm and dry. Neurological:      General: No focal deficit present. Mental Status: He is alert and oriented to person, place, and time. Psychiatric:         Mood and Affect: Mood normal.         Behavior: Behavior normal.       EKG: Atrial fibrillation, controlled ventricular rate in the 70s, left anterior fascicular block, right bundle branch block, nonspecific T wave abnormality. No change compared to the previous EKG. ASSESSMENT and PLAN  Encounter Diagnoses   Name Primary?  Aortic valve stenosis, etiology of cardiac valve disease unspecified Yes    Coronary artery disease involving native coronary artery of native heart without angina pectoris     Chronic obstructive pulmonary disease, unspecified COPD type ([address]     Persistent atrial fibrillation ([address]     Essential hypertension     Hyperlipidemia, unspecified hyperlipidemia type      Moderate aortic valve stenosis. This has remained in the moderate range for the past few years. He had a mean valve gradient invasively in 2018 of 25 mmHg. His last echocardiogram was in June 2021 demonstrated a slightly increased gradient of 31 mmHg. I would continue to monitor this annually unless his symptoms worsen. Hypotension. I attempted to decrease his metoprolol last visit, however he did not tolerate this likely due to increased rates with his atrial fibrillation. I recommend stopping his long-acting nitrates altogether and see if his blood pressure will improve. Longstanding persistent atrial fibrillation. Patient's heart rate is well controlled on the current dose of metoprolol  mg daily. He remains on warfarin for oral anticoagulation. His goal INR is between 2 and 3. This has been followed by his PCP. Nonobstructive coronary artery disease. This was last assessed by cardiac catheterization in 2018. He had an ostial 35% left main stenosis along with a 55% mid LAD lesion and a 40% circumflex lesion. Medical therapy has been recommended. Dyslipidemia. Patient has been managed with simvastatin 40 mg daily. I prefer his LDL to be less than 70. This is being followed by his PCP. Tobacco use disorder. Patient has a 50+ pack year smoking history. He states his again smoking close to a pack of cigarettes a day. He is encouraged to consider complete smoking cessation. COPD. This appeared mild with chronic bronchitis on a CT scan of his chest in 2020. He does have inhalers which is being prescribed by his PCP. Follow-up in 6 months, sooner if needed.

## 2021-07-09 NOTE — PATIENT INSTRUCTIONS
Follow up with Dr Aida Couch with EKG in 6 months  Stop Isosorbide mononitrate  Continue Metoprolol 100mg daily

## 2021-07-09 NOTE — PROGRESS NOTES
Jennifer Luo presents today for   Chief Complaint   Patient presents with    Follow-up     6 month follow up       Jennifer Luo preferred language for health care discussion is english/other. Is someone accompanying this pt? no    Is the patient using any DME equipment during [address]? no    Depression Screening:  3 most recent PHQ Screens 4/19/2021   Little interest or pleasure in doing things More than half the days   Feeling down, depressed, irritable, or hopeless Not at all   Total Score PHQ 2 2   Trouble falling or staying asleep, or sleeping too much Not at all   Feeling tired or having little energy More than half the days   Poor appetite, weight loss, or overeating Nearly every day   Feeling bad about yourself - or that you are a failure or have let yourself or your family down Not at all   Trouble concentrating on things such as school, work, reading, or watching TV Not at all   Moving or speaking so slowly that other people could have noticed; or the opposite being so fidgety that others notice Not at all   Thoughts of being better off dead, or hurting yourself in some way Not at all   PHQ 9 Score 7   How difficult have these problems made it for you to do your work, take care of your home and get along with others -       Learning Assessment:  Learning Assessment 2/8/2021   PRIMARY LEARNER Patient   HIGHEST LEVEL OF EDUCATION - PRIMARY LEARNER  -   BARRIERS PRIMARY LEARNER -   CO-LEARNER CAREGIVER -   PRIMARY LANGUAGE ENGLISH   LEARNER PREFERENCE PRIMARY DEMONSTRATION     -   ANSWERED BY patient   RELATIONSHIP SELF       Abuse Screening:  Abuse Screening Questionnaire 2/8/2021   Do you ever feel afraid of your partner? N   Are you in a relationship with someone who physically or mentally threatens you? N   Is it safe for you to go home? Y       Fall Risk  Fall Risk Assessment, last 12 mths 2/8/2021   Able to walk? Yes   Fall in past 12 months? 0   Do you feel unsteady?  0   Are you worried about falling 0       Pt currently taking Anticoagulant therapy? ASA 81mg every day and     Coordination of Care:  1. Have you been to the ER, urgent care clinic since your last visit? Hospitalized since your last visit? no    2. Have you seen or consulted any other health care providers outside of the [address] [place] [address] since your last visit? Include any pap smears or colon screening.  no

## 2022-01-07 ENCOUNTER — OFFICE VISIT (OUTPATIENT)
Dept: CARDIOLOGY CLINIC | Age: 77
End: 2022-01-07
Payer: MEDICARE

## 2022-01-07 VITALS
WEIGHT: 238 LBS | SYSTOLIC BLOOD PRESSURE: 110 MMHG | BODY MASS INDEX: 35.25 KG/M2 | HEIGHT: 69 IN | DIASTOLIC BLOOD PRESSURE: 78 MMHG | OXYGEN SATURATION: 99 % | HEART RATE: 81 BPM

## 2022-01-07 DIAGNOSIS — R06.09 DOE (DYSPNEA ON EXERTION): ICD-10-CM

## 2022-01-07 DIAGNOSIS — I10 ESSENTIAL HYPERTENSION: ICD-10-CM

## 2022-01-07 DIAGNOSIS — I35.0 AORTIC VALVE STENOSIS, ETIOLOGY OF CARDIAC VALVE DISEASE UNSPECIFIED: Primary | ICD-10-CM

## 2022-01-07 DIAGNOSIS — J44.9 CHRONIC OBSTRUCTIVE PULMONARY DISEASE, UNSPECIFIED COPD TYPE (HCC): ICD-10-CM

## 2022-01-07 DIAGNOSIS — E78.5 HYPERLIPIDEMIA, UNSPECIFIED HYPERLIPIDEMIA TYPE: ICD-10-CM

## 2022-01-07 DIAGNOSIS — I25.10 CORONARY ARTERY DISEASE INVOLVING NATIVE CORONARY ARTERY OF NATIVE HEART WITHOUT ANGINA PECTORIS: ICD-10-CM

## 2022-01-07 DIAGNOSIS — I48.19 PERSISTENT ATRIAL FIBRILLATION (HCC): ICD-10-CM

## 2022-01-07 PROCEDURE — 93000 ELECTROCARDIOGRAM COMPLETE: CPT | Performed by: INTERNAL MEDICINE

## 2022-01-07 PROCEDURE — 3017F COLORECTAL CA SCREEN DOC REV: CPT | Performed by: INTERNAL MEDICINE

## 2022-01-07 PROCEDURE — G8510 SCR DEP NEG, NO PLAN REQD: HCPCS | Performed by: INTERNAL MEDICINE

## 2022-01-07 PROCEDURE — G8536 NO DOC ELDER MAL SCRN: HCPCS | Performed by: INTERNAL MEDICINE

## 2022-01-07 PROCEDURE — 99214 OFFICE O/P EST MOD 30 MIN: CPT | Performed by: INTERNAL MEDICINE

## 2022-01-07 PROCEDURE — G8417 CALC BMI ABV UP PARAM F/U: HCPCS | Performed by: INTERNAL MEDICINE

## 2022-01-07 PROCEDURE — G8752 SYS BP LESS 140: HCPCS | Performed by: INTERNAL MEDICINE

## 2022-01-07 PROCEDURE — G8427 DOCREV CUR MEDS BY ELIG CLIN: HCPCS | Performed by: INTERNAL MEDICINE

## 2022-01-07 PROCEDURE — 1101F PT FALLS ASSESS-DOCD LE1/YR: CPT | Performed by: INTERNAL MEDICINE

## 2022-01-07 PROCEDURE — G8754 DIAS BP LESS 90: HCPCS | Performed by: INTERNAL MEDICINE

## 2022-01-07 RX ORDER — LEVOTHYROXINE SODIUM 100 UG/1
100 TABLET ORAL
COMMUNITY
End: 2022-10-18

## 2022-01-07 RX ORDER — ACETAMINOPHEN 500 MG
1000 TABLET ORAL
COMMUNITY

## 2022-01-07 RX ORDER — ROSUVASTATIN CALCIUM 20 MG/1
20 TABLET, COATED ORAL
COMMUNITY

## 2022-01-07 NOTE — PROGRESS NOTES
HISTORY OF PRESENT ILLNESS  Wendi Willis is a 78 y.o. male. Follow-up  Associated symptoms include shortness of breath. Pertinent negatives include no chest pain, no abdominal pain and no headaches. Shortness of Breath  Pertinent negatives include no fever, no headaches, no cough, no wheezing, no PND, no orthopnea, no chest pain, no vomiting, no abdominal pain, no rash, no leg swelling and no claudication. Patient presents for a follow-up office visit. He was previously followed by Dr. Ghassan Castro up until his intermediate at the end of last year. He has a past medical history significant for persistent atrial fibrillation, moderate nonobstructive coronary disease and moderate aortic valve stenosis. He is also a longtime smoker with a 50-pack-year smoking history still smoking half a pack of cigarettes a day. As result he does have significant COPD. He was last seen in the office 6 months ago. He underwent a repeat echocardiogram in June 2021 which showed preserved LV function, EF 55 to 60% with moderate aortic valve stenosis and a mean valve gradient 31 mmHg. This was a slight increase compared to the year prior where his mean gradient was 25 mmHg. His last cardiac catheterization was in 2018 which demonstrated moderate nonobstructive CAD involving his left main and mid LAD along with his mid left circumflex. His mean gradient was 25 mmHg at that time. Patient was last seen in our office 6 months ago. Since last visit, he feels his exertional dyspnea has worsened over the past few months. He does feel better after he takes his inhalers. He still complains of shortness of breath with any exertional or normal day-to-day activity. He has cut back in his smoking to about half a pack of cigarettes a day. He denies any leg swelling, orthopnea or PND. No significant exertional chest tightness, dizziness, nor syncope.     Past Medical History:   Diagnosis Date    Atrial fibrillation ([address] 0    CAD (coronary artery disease)     Cardiac nuclear imaging test, normal 04/01/2016    Low risk. No ischemia or prior infarction. EF 52%. Neg EKG on pharm stress test.    [facility]     DM (diabetes mellitus) [facility]     GERD (gastroesophageal reflux disease)     Hematuria 2001    HLD (hyperlipidemia)     HTN (hypertension)     HTN (hypertension)     Hypothyroid     Pancreatitis      Current Outpatient Medications   Medication Sig Dispense Refill    levothyroxine (SYNTHROID) 100 mcg tablet Take 100 mcg by mouth Daily (before breakfast).  rosuvastatin (CRESTOR) 20 mg tablet Take 20 mg by mouth nightly.  vit A/vit C/vit E/zinc/copper (PRESERVISION AREDS PO) Take 1 Tablet by mouth two (2) times a day.  APPLE CIDER VINEGAR PO Take 1 Tablet by mouth two (2) times a day.  acetaminophen (Tylenol Extra Strength) 500 mg tablet Take 1,000 mg by mouth every six (6) hours as needed for Pain.  furosemide (LASIX) 20 mg tablet TAKE 1 TABLET BY MOUTH  DAILY 90 Tab 3    metoprolol succinate (TOPROL-XL) 100 mg tablet Take 1 Tab by mouth daily. 90 Tab 3    glimepiride (AMARYL) 4 mg tablet Take 4 mg by mouth two (2) times a day.  fluticasone-umeclidinium-vilanterol (Trelegy Ellipta) 100-62.5-25 mcg inhaler Take 1 Puff by inhalation daily. 2 Inhaler 0    nitroglycerin (NITROSTAT) 0.4 mg SL tablet 1 Tab by SubLINGual route every five (5) minutes as needed for Chest Pain. 1 Bottle 3    metFORMIN ER (GLUCOPHAGE XR) 500 mg tablet Take 500 mg by mouth two (2) times a day.  aspirin delayed-release 81 mg tablet Take 81 mg by mouth daily.  warfarin (COUMADIN) 5 mg tablet Take 1 tablet by mouth once a day      albuterol (PROVENTIL HFA, VENTOLIN HFA, PROAIR HFA) 90 mcg/actuation inhaler Take 2 Puffs by inhalation every six (6) hours as needed for Wheezing.  MULTIVIT-MINERALS/FOLIC ACID (ONE DAILY GUMMY VITES PO) Take  by mouth daily.       pantoprazole (PROTONIX) 40 mg tablet Take 40 mg by mouth two (2) times a day.  cholecalciferol, vitamin D3, 2,000 unit tab Take 1 Tab by mouth daily. No Known Allergies     Social History     Tobacco Use    Smoking status: Current Every Day Smoker     Packs/day: 1.50     Years: 55.00     Pack years: 82.50    Smokeless tobacco: Never Used    Tobacco comment: 10 cigarettes per day lately   Substance Use Topics    Alcohol use: Yes     Comment: every other day    Drug use: No     Family History   Problem Relation Age of Onset    Diabetes Mother     Diabetes Father     Stroke Father          Review of Systems   Constitutional: Negative for chills, fever and weight loss. HENT: Negative for nosebleeds. Eyes: Negative for blurred vision and double vision. Respiratory: Positive for shortness of breath. Negative for cough and wheezing. Cardiovascular: Negative for chest pain, palpitations, orthopnea, claudication, leg swelling and PND. Gastrointestinal: Negative for abdominal pain, heartburn, nausea and vomiting. Genitourinary: Negative for dysuria and hematuria. Musculoskeletal: Negative for falls and myalgias. Skin: Negative for rash. Neurological: Negative for dizziness, focal weakness and headaches. Endo/Heme/Allergies: Does not bruise/bleed easily. Psychiatric/Behavioral: Negative for substance abuse. Visit Vitals  /78 (BP 1 Location: Left upper arm, BP Patient Position: Sitting, BP Cuff Size: Small adult)   Pulse 81   Ht 5' 9\" (1.753 m)   Wt 108 kg (238 lb)   SpO2 99%   BMI 35.15 kg/m²         Physical Exam  Constitutional:       Appearance: He is well-developed. HENT:      Head: Normocephalic and atraumatic. Eyes:      Conjunctiva/sclera: Conjunctivae normal.   Neck:      Vascular: No carotid bruit or JVD. Cardiovascular:      Rate and Rhythm: Normal rate. Rhythm irregularly irregular. Pulses: Normal pulses. Heart sounds: S1 normal and S2 normal. Heart sounds are distant.  Murmur heard.    Harsh midsystolic murmur is present with a grade of 3/6 at the upper right sternal border radiating to the neck. No gallop. No S3 sounds. Pulmonary:      Breath sounds: Decreased breath sounds present. No wheezing, rhonchi or rales. Abdominal:      General: Bowel sounds are normal.      Palpations: Abdomen is soft. Tenderness: There is no abdominal tenderness. Musculoskeletal:         General: No swelling, tenderness or deformity. Cervical back: Neck supple. Skin:     General: Skin is warm and dry. Neurological:      General: No focal deficit present. Mental Status: He is alert and oriented to person, place, and time. Psychiatric:         Mood and Affect: Mood normal.         Behavior: Behavior normal.       EKG: Atrial fibrillation, controlled ventricular rate around 80, left anterior fascicular block, right bundle branch block, nonspecific T wave abnormality. No change compared to the previous EKG. ASSESSMENT and PLAN  Encounter Diagnoses   Name Primary?  Aortic valve stenosis, etiology of cardiac valve disease unspecified Yes    Coronary artery disease involving native coronary artery of native heart without angina pectoris     Chronic obstructive pulmonary disease, unspecified COPD type ([address]     Persistent atrial fibrillation ([address]     Essential hypertension     Hyperlipidemia, unspecified hyperlipidemia type     RUFFIN (dyspnea on exertion)      Moderate aortic valve stenosis. This has remained in the moderate range for the past few years. He had a mean valve gradient invasively in 2018 of 25 mmHg. His last echocardiogram was in June 2021 demonstrated a slightly increased gradient of 31 mmHg. I have recommended a repeat echocardiogram in 5+ months prior to his next office visit to reevaluate his severity of stenosis. Dyspnea on exertion. I suspect this is multifactorial.  His symptoms do improve with his inhalers, so this may be more pulmonary in nature. His cardiac conditions will be followed closely as described above. Longstanding persistent atrial fibrillation. Patient's heart rate is well controlled on the current dose of metoprolol  mg daily. He remains on warfarin for oral anticoagulation. His goal INR is between 2 and 3. This has been followed by his PCP. Nonobstructive coronary artery disease. This was last assessed by cardiac catheterization in 2018. He had an ostial 35% left main stenosis along with a 55% mid LAD lesion and a 40% circumflex lesion. Medical therapy has been recommended. A pharmacologic nuclear stress test will be arranged prior to his next office visit. Dyslipidemia. Patient h is now taking rosuvastatin 20 mg daily. I prefer his LDL to be less than 70. This is being followed by his PCP. Tobacco use disorder. Patient has a 50+ pack year smoking history. Patient has cut back in smoking now half a pack of cigarettes a day. He is congratulated encouraged to consider complete smoking cessation. COPD. Patient now follows up with a pulmonologist.  He has been started on multiple inhalers. Follow-up in 6 months, sooner if needed.

## 2022-02-11 RX ORDER — FUROSEMIDE 20 MG/1
TABLET ORAL
Qty: 90 TABLET | Refills: 3 | Status: SHIPPED | OUTPATIENT
Start: 2022-02-11

## 2022-02-28 NOTE — PROGRESS NOTES
Review of Systems   Constitutional: Negative for chills, fever, malaise/fatigue and weight loss. Respiratory: Negative for cough, hemoptysis, shortness of breath and wheezing. Cardiovascular: Negative for chest pain, palpitations, orthopnea and leg swelling. Gastrointestinal: Negative. Musculoskeletal: Negative for falls, joint pain and myalgias. Neurological: Negative for dizziness. [Follow-up Visit ___] : a follow-up visit  for [unfilled]

## 2022-03-18 PROBLEM — E11.21 TYPE 2 DIABETES WITH NEPHROPATHY (HCC): Status: ACTIVE | Noted: 2018-04-25

## 2022-03-19 PROBLEM — Z90.49 H/O PAROTIDECTOMY: Status: ACTIVE | Noted: 2018-01-15

## 2022-03-19 PROBLEM — R59.0 LYMPHADENOPATHY OF HEAD AND NECK REGION: Status: ACTIVE | Noted: 2017-11-15

## 2022-03-19 PROBLEM — E66.01 SEVERE OBESITY (BMI 35.0-39.9) WITH COMORBIDITY (HCC): Status: ACTIVE | Noted: 2018-04-25

## 2022-04-05 RX ORDER — METOPROLOL SUCCINATE 100 MG/1
TABLET, EXTENDED RELEASE ORAL
Qty: 90 TABLET | Refills: 3 | Status: SHIPPED | OUTPATIENT
Start: 2022-04-05 | End: 2022-10-18

## 2022-06-07 ENCOUNTER — TELEPHONE (OUTPATIENT)
Dept: CARDIOLOGY CLINIC | Age: 77
End: 2022-06-07

## 2022-06-28 ENCOUNTER — TELEPHONE (OUTPATIENT)
Dept: CARDIOLOGY CLINIC | Age: 77
End: 2022-06-28

## 2022-06-28 NOTE — TELEPHONE ENCOUNTER
----- Message from Sebastian Phoenix MD sent at 6/10/2022  5:34 PM EDT -----  Please let the patient know that his nuclear stress test was normal and low risk.  ----- Message -----  From: Dona Bustamante LPN  Sent: 7/2/0494   2:27 PM EDT  To: Sebastian Phoenix MD    Per your note - Nonobstructive coronary artery disease. This was last assessed by cardiac catheterization in 2018. He had an ostial 35% left main stenosis along with a 55% mid LAD lesion and a 40% circumflex lesion. Medical therapy has been recommended. A pharmacologic nuclear stress test will be arranged prior to his next office visit.

## 2022-06-28 NOTE — TELEPHONE ENCOUNTER
Patient made aware of stress test results and Dr. Maye Melton remarks. No questions or concerns at present.

## 2022-07-08 ENCOUNTER — OFFICE VISIT (OUTPATIENT)
Dept: CARDIOLOGY CLINIC | Age: 77
End: 2022-07-08
Payer: MEDICARE

## 2022-07-08 VITALS
OXYGEN SATURATION: 97 % | BODY MASS INDEX: 33.62 KG/M2 | HEIGHT: 69 IN | DIASTOLIC BLOOD PRESSURE: 88 MMHG | HEART RATE: 94 BPM | SYSTOLIC BLOOD PRESSURE: 124 MMHG | WEIGHT: 227 LBS

## 2022-07-08 DIAGNOSIS — I25.10 CORONARY ARTERY DISEASE INVOLVING NATIVE CORONARY ARTERY OF NATIVE HEART WITHOUT ANGINA PECTORIS: ICD-10-CM

## 2022-07-08 DIAGNOSIS — I48.19 PERSISTENT ATRIAL FIBRILLATION (HCC): ICD-10-CM

## 2022-07-08 DIAGNOSIS — I10 ESSENTIAL HYPERTENSION: ICD-10-CM

## 2022-07-08 DIAGNOSIS — E78.5 HYPERLIPIDEMIA, UNSPECIFIED HYPERLIPIDEMIA TYPE: ICD-10-CM

## 2022-07-08 DIAGNOSIS — J44.9 CHRONIC OBSTRUCTIVE PULMONARY DISEASE, UNSPECIFIED COPD TYPE (HCC): ICD-10-CM

## 2022-07-08 DIAGNOSIS — I35.0 SEVERE AORTIC VALVE STENOSIS: Primary | ICD-10-CM

## 2022-07-08 PROCEDURE — G8536 NO DOC ELDER MAL SCRN: HCPCS | Performed by: INTERNAL MEDICINE

## 2022-07-08 PROCEDURE — G8754 DIAS BP LESS 90: HCPCS | Performed by: INTERNAL MEDICINE

## 2022-07-08 PROCEDURE — 3017F COLORECTAL CA SCREEN DOC REV: CPT | Performed by: INTERNAL MEDICINE

## 2022-07-08 PROCEDURE — G8510 SCR DEP NEG, NO PLAN REQD: HCPCS | Performed by: INTERNAL MEDICINE

## 2022-07-08 PROCEDURE — 93000 ELECTROCARDIOGRAM COMPLETE: CPT | Performed by: INTERNAL MEDICINE

## 2022-07-08 PROCEDURE — G8417 CALC BMI ABV UP PARAM F/U: HCPCS | Performed by: INTERNAL MEDICINE

## 2022-07-08 PROCEDURE — 1101F PT FALLS ASSESS-DOCD LE1/YR: CPT | Performed by: INTERNAL MEDICINE

## 2022-07-08 PROCEDURE — G8752 SYS BP LESS 140: HCPCS | Performed by: INTERNAL MEDICINE

## 2022-07-08 PROCEDURE — G8427 DOCREV CUR MEDS BY ELIG CLIN: HCPCS | Performed by: INTERNAL MEDICINE

## 2022-07-08 PROCEDURE — 1123F ACP DISCUSS/DSCN MKR DOCD: CPT | Performed by: INTERNAL MEDICINE

## 2022-07-08 PROCEDURE — 99215 OFFICE O/P EST HI 40 MIN: CPT | Performed by: INTERNAL MEDICINE

## 2022-07-08 NOTE — PROGRESS NOTES
HISTORY OF PRESENT ILLNESS  Susan Merino is a 78 y.o. male. Follow-up  Associated symptoms include shortness of breath. Pertinent negatives include no chest pain, no abdominal pain and no headaches. Shortness of Breath  Pertinent negatives include no fever, no headaches, no cough, no wheezing, no PND, no orthopnea, no chest pain, no vomiting, no abdominal pain, no rash, no leg swelling and no claudication. Patient presents for a follow-up office visit. He was previously followed by Dr. James Hanson [facility]. He has a past medical history significant for persistent atrial fibrillation, moderate nonobstructive coronary disease and moderate aortic valve stenosis. He is also a longtime smoker with a 50-pack-year smoking history still smoking half a pack of cigarettes a day. As result he does have significant COPD. He was last seen in the office 6 months ago. He underwent a repeat echocardiogram in June 2021 which showed preserved LV function, EF 55 to 60% with moderate aortic valve stenosis and a mean valve gradient 31 mmHg. This was a slight increase compared to the year prior where his mean gradient was 25 mmHg. His last cardiac catheterization was in 2018 which demonstrated moderate nonobstructive CAD involving his left main and mid LAD along with his mid left circumflex. His mean gradient was 25 mmHg at that time. Patient recently underwent a follow-up echocardiogram and pharmacologic nuclear stress test in June 2022. His nuclear stress test was a normal low risk study, no evidence of ischemia or infarct, EF 52%. His echocardiogram showed significant increase in his aortic valve gradient, now in the severe range: Mean gradient 43 mmHg, calculated TEDDY 0.7 cm² with preserved LV function, EF 55 to 60%. He was last seen in our office 6 months ago. He states that his shortness of breath has become much more pronounced over the past 3 to 6 months.   He states any physical activity just showering will leave him quite winded. He denies any chest pain or pressure. He does have occasional dizzy spells but no lexx syncope or near syncope. Past Medical History:   Diagnosis Date    Atrial fibrillation ([facility] 1998    CAD (coronary artery disease)     Cardiac nuclear imaging test, normal 04/01/2016    Low risk. No ischemia or prior infarction. EF 52%. Neg EKG on pharm stress test.    Diabetes ([facility]     DM (diabetes mellitus) ([facility]     GERD (gastroesophageal reflux disease)     Hematuria 2001    HLD (hyperlipidemia)     HTN (hypertension)     HTN (hypertension)     Hypothyroid     Pancreatitis      Current Outpatient Medications   Medication Sig Dispense Refill    metoprolol succinate (TOPROL-XL) 100 mg tablet TAKE 1 TABLET BY MOUTH  DAILY 90 Tablet 3    furosemide (LASIX) 20 mg tablet TAKE 1 TABLET BY MOUTH  DAILY 90 Tablet 3    levothyroxine (SYNTHROID) 100 mcg tablet Take 100 mcg by mouth Daily (before breakfast).  rosuvastatin (CRESTOR) 20 mg tablet Take 20 mg by mouth nightly.  vit A/vit C/vit E/zinc/copper (PRESERVISION AREDS PO) Take 1 Tablet by mouth two (2) times a day.  APPLE CIDER VINEGAR PO Take 1 Tablet by mouth two (2) times a day.  acetaminophen (Tylenol Extra Strength) 500 mg tablet Take 1,000 mg by mouth every six (6) hours as needed for Pain.  glimepiride (AMARYL) 4 mg tablet Take 4 mg by mouth two (2) times a day.  fluticasone-umeclidinium-vilanterol (Trelegy Ellipta) 100-62.5-25 mcg inhaler Take 1 Puff by inhalation daily. 2 Inhaler 0    nitroglycerin (NITROSTAT) 0.4 mg SL tablet 1 Tab by SubLINGual route every five (5) minutes as needed for Chest Pain. 1 Bottle 3    metFORMIN ER (GLUCOPHAGE XR) 500 mg tablet Take 500 mg by mouth two (2) times a day.  aspirin delayed-release 81 mg tablet Take 81 mg by mouth daily.       warfarin (COUMADIN) 5 mg tablet Take 1 tablet by mouth once a day      albuterol (PROVENTIL HFA, VENTOLIN HFA, PROAIR HFA) 90 mcg/actuation inhaler Take 2 Puffs by inhalation every six (6) hours as needed for Wheezing.  MULTIVIT-MINERALS/FOLIC ACID (ONE DAILY GUMMY VITES PO) Take  by mouth daily.  pantoprazole (PROTONIX) 40 mg tablet Take 40 mg by mouth two (2) times a day.  cholecalciferol, vitamin D3, 2,000 unit tab Take 1 Tab by mouth daily. No Known Allergies     Social History     Tobacco Use    Smoking status: Current Every Day Smoker     Packs/day: 1.50     Years: 55.00     Pack years: 82.50    Smokeless tobacco: Never Used    Tobacco comment: 10 cigarettes per day lately   Vaping Use    Vaping Use: Never used   Substance Use Topics    Alcohol use: Yes     Comment: every other day    Drug use: No     Family History   Problem Relation Age of Onset    Diabetes Mother     Diabetes Father     Stroke Father          Review of Systems   Constitutional: Negative for chills, fever and weight loss. HENT: Negative for nosebleeds. Eyes: Negative for blurred vision and double vision. Respiratory: Positive for shortness of breath. Negative for cough and wheezing. Cardiovascular: Negative for chest pain, palpitations, orthopnea, claudication, leg swelling and PND. Gastrointestinal: Negative for abdominal pain, heartburn, nausea and vomiting. Genitourinary: Negative for dysuria and hematuria. Musculoskeletal: Negative for falls and myalgias. Skin: Negative for rash. Neurological: Negative for dizziness, focal weakness and headaches. Endo/Heme/Allergies: Does not bruise/bleed easily. Psychiatric/Behavioral: Negative for substance abuse. Visit Vitals  /88 (BP 1 Location: Left upper arm, BP Patient Position: Sitting, BP Cuff Size: Large adult)   Pulse 94   Ht 5' 9\" (1.753 m)   Wt 103 kg (227 lb)   SpO2 97%   BMI 33.52 kg/m²         Physical Exam  Constitutional:       Appearance: He is well-developed.    HENT:      Head: Normocephalic and atraumatic. Eyes:      Conjunctiva/sclera: Conjunctivae normal.   Neck:      Vascular: No carotid bruit or JVD. Cardiovascular:      Rate and Rhythm: Normal rate. Rhythm irregularly irregular. Pulses: Normal pulses. Heart sounds: S1 normal and S2 normal. Heart sounds are distant. Murmur heard. Harsh midsystolic murmur is present with a grade of 3/6 at the upper right sternal border radiating to the neck. No gallop. No S3 sounds. Pulmonary:      Breath sounds: Decreased breath sounds present. No wheezing, rhonchi or rales. Abdominal:      General: Bowel sounds are normal.      Palpations: Abdomen is soft. Tenderness: There is no abdominal tenderness. Musculoskeletal:         General: No swelling, tenderness or deformity. Cervical back: Neck supple. Skin:     General: Skin is warm and dry. Neurological:      General: No focal deficit present. Mental Status: He is alert and oriented to person, place, and time. Psychiatric:         Mood and Affect: Mood normal.         Behavior: Behavior normal.       EKG: Atrial fibrillation, controlled ventricular rate around 90, left anterior fascicular block, nonspecific IVCD with poor R wave progression, nonspecific T wave abnormality. No change compared to the previous EKG. ASSESSMENT and PLAN  Encounter Diagnoses   Name Primary?  Severe aortic valve stenosis Yes    Coronary artery disease involving native coronary artery of native heart without angina pectoris     Chronic obstructive pulmonary disease, unspecified COPD type ([address]     Persistent atrial fibrillation ([address]     Essential hypertension     Hyperlipidemia, unspecified hyperlipidemia type      Severe symptomatic aortic valve stenosis. This is now in the severe range on his repeat echocardiogram in June 2022. Mean valve gradient 43 mmHg, calculated TEDDY 0.7 cm2. His symptoms have rapidly progressed over the past 6 months as well.   I recommended referral to structural cardiology at [facility] for TAVR evaluation. Dyspnea on exertion. There is certainly a component of his chronic COPD, but I suspect his severe aortic valve stenosis is also contributing. Longstanding persistent atrial fibrillation. Patient's heart rate is well controlled on the current dose of metoprolol  mg daily. He remains on warfarin for oral anticoagulation. His goal INR is between 2 and 3. This has been followed by his PCP. Historically this has been well controlled. Nonobstructive coronary artery disease. This was last assessed by cardiac catheterization in 2018. He had an ostial 35% left main stenosis along with a 55% mid LAD lesion and a 40% circumflex lesion. He recently completed a pharmacologic nuclear stress test in June 2022 which did not demonstrate any ischemia or infarct, EF 52%. Overall low risk. Dyslipidemia. Patient remains on rosuvastatin 20 mg daily. I prefer his LDL to be less than 70. This is being followed by his PCP. Tobacco use disorder. Patient has a 50+ pack year smoking history. Patient has cut back in smoking now half a pack of cigarettes a day. He is congratulated encouraged to consider complete smoking cessation. COPD. Patient now follows up with a pulmonologist.  He has been started on multiple inhalers. He reports that he has not noted significant improvement since starting the inhalers. Follow-up in 3 months, sooner if needed.

## 2022-07-08 NOTE — PROGRESS NOTES
Claudia Steve presents today for   Chief Complaint   Patient presents with    Follow-up     6 month    Shortness of Breath     exertion       Claudia Steve preferred language for health care discussion is english/other. Is someone accompanying this pt? no    Is the patient using any DME equipment during [address]? no    Depression Screening:  3 most recent PHQ Screens 7/8/2022   Little interest or pleasure in doing things Not at all   Feeling down, depressed, irritable, or hopeless Not at all   Total Score PHQ 2 0   Trouble falling or staying asleep, or sleeping too much -   Feeling tired or having little energy -   Poor appetite, weight loss, or overeating -   Feeling bad about yourself - or that you are a failure or have let yourself or your family down -   Trouble concentrating on things such as school, work, reading, or watching TV -   Moving or speaking so slowly that other people could have noticed; or the opposite being so fidgety that others notice -   Thoughts of being better off dead, or hurting yourself in some way -   PHQ 9 Score -   How difficult have these problems made it for you to do your work, take care of your home and get along with others -       Learning Assessment:  Learning Assessment 7/8/2022   PRIMARY LEARNER Patient   HIGHEST LEVEL OF EDUCATION - PRIMARY LEARNER  -   BARRIERS PRIMARY LEARNER -   CO-LEARNER CAREGIVER -   PRIMARY LANGUAGE ENGLISH   LEARNER PREFERENCE PRIMARY DEMONSTRATION     -   ANSWERED BY patient   RELATIONSHIP SELF       Abuse Screening:  Abuse Screening Questionnaire 7/8/2022   Do you ever feel afraid of your partner? N   Are you in a relationship with someone who physically or mentally threatens you? N   Is it safe for you to go home? Y       Fall Risk  Fall Risk Assessment, last 12 mths 7/8/2022   Able to walk? Yes   Fall in past 12 months? 0   Do you feel unsteady? 0   Are you worried about falling 0           Pt currently taking Anticoagulant therapy? Warfarin     Pt currently taking Antiplatelet therapy ? Aspirin 81 mg daily      Coordination of Care:  1. Have you been to the ER, urgent care clinic since your last visit? Hospitalized since your last visit? no    2. Have you seen or consulted any other health care providers outside of the [address] [place] [address] since your last visit? Include any pap smears or colon screening.  no

## 2022-10-18 ENCOUNTER — OFFICE VISIT (OUTPATIENT)
Dept: CARDIOLOGY CLINIC | Age: 77
End: 2022-10-18
Payer: MEDICARE

## 2022-10-18 VITALS
WEIGHT: 228 LBS | OXYGEN SATURATION: 96 % | BODY MASS INDEX: 33.77 KG/M2 | DIASTOLIC BLOOD PRESSURE: 80 MMHG | HEART RATE: 86 BPM | SYSTOLIC BLOOD PRESSURE: 138 MMHG | HEIGHT: 69 IN

## 2022-10-18 DIAGNOSIS — Z95.2 HISTORY OF TRANSCATHETER AORTIC VALVE REPLACEMENT (TAVR): ICD-10-CM

## 2022-10-18 DIAGNOSIS — I35.0 SEVERE AORTIC VALVE STENOSIS: Primary | ICD-10-CM

## 2022-10-18 DIAGNOSIS — J44.9 CHRONIC OBSTRUCTIVE PULMONARY DISEASE, UNSPECIFIED COPD TYPE (HCC): ICD-10-CM

## 2022-10-18 DIAGNOSIS — E11.21 TYPE 2 DIABETES WITH NEPHROPATHY (HCC): ICD-10-CM

## 2022-10-18 DIAGNOSIS — I48.19 PERSISTENT ATRIAL FIBRILLATION (HCC): ICD-10-CM

## 2022-10-18 DIAGNOSIS — I25.10 CORONARY ARTERY DISEASE INVOLVING NATIVE CORONARY ARTERY OF NATIVE HEART WITHOUT ANGINA PECTORIS: ICD-10-CM

## 2022-10-18 DIAGNOSIS — E78.5 HYPERLIPIDEMIA, UNSPECIFIED HYPERLIPIDEMIA TYPE: ICD-10-CM

## 2022-10-18 PROCEDURE — G8427 DOCREV CUR MEDS BY ELIG CLIN: HCPCS | Performed by: INTERNAL MEDICINE

## 2022-10-18 PROCEDURE — G8417 CALC BMI ABV UP PARAM F/U: HCPCS | Performed by: INTERNAL MEDICINE

## 2022-10-18 PROCEDURE — G8536 NO DOC ELDER MAL SCRN: HCPCS | Performed by: INTERNAL MEDICINE

## 2022-10-18 PROCEDURE — 99215 OFFICE O/P EST HI 40 MIN: CPT | Performed by: INTERNAL MEDICINE

## 2022-10-18 PROCEDURE — G8510 SCR DEP NEG, NO PLAN REQD: HCPCS | Performed by: INTERNAL MEDICINE

## 2022-10-18 PROCEDURE — 2022F DILAT RTA XM EVC RTNOPTHY: CPT | Performed by: INTERNAL MEDICINE

## 2022-10-18 PROCEDURE — G8752 SYS BP LESS 140: HCPCS | Performed by: INTERNAL MEDICINE

## 2022-10-18 PROCEDURE — 3017F COLORECTAL CA SCREEN DOC REV: CPT | Performed by: INTERNAL MEDICINE

## 2022-10-18 PROCEDURE — 3046F HEMOGLOBIN A1C LEVEL >9.0%: CPT | Performed by: INTERNAL MEDICINE

## 2022-10-18 PROCEDURE — 1101F PT FALLS ASSESS-DOCD LE1/YR: CPT | Performed by: INTERNAL MEDICINE

## 2022-10-18 PROCEDURE — 1123F ACP DISCUSS/DSCN MKR DOCD: CPT | Performed by: INTERNAL MEDICINE

## 2022-10-18 PROCEDURE — G8754 DIAS BP LESS 90: HCPCS | Performed by: INTERNAL MEDICINE

## 2022-10-18 RX ORDER — POLYETHYLENE GLYCOL 3350 17 G/17G
17 POWDER, FOR SOLUTION ORAL DAILY PRN
COMMUNITY
Start: 2022-09-01

## 2022-10-18 RX ORDER — DILTIAZEM HYDROCHLORIDE 180 MG/1
180 CAPSULE, EXTENDED RELEASE ORAL DAILY
COMMUNITY
Start: 2022-09-23

## 2022-10-18 RX ORDER — LEVOTHYROXINE SODIUM 125 UG/1
125 TABLET ORAL
COMMUNITY

## 2022-10-18 RX ORDER — CHOLECALCIFEROL TAB 125 MCG (5000 UNIT) 125 MCG
5000 TAB ORAL DAILY
COMMUNITY

## 2022-10-18 RX ORDER — CLOPIDOGREL BISULFATE 75 MG/1
75 TABLET ORAL DAILY
Qty: 30 TABLET | Refills: 4 | Status: SHIPPED | OUTPATIENT
Start: 2022-10-18

## 2022-10-18 RX ORDER — METOPROLOL TARTRATE 100 MG/1
100 TABLET ORAL 2 TIMES DAILY
COMMUNITY
Start: 2022-09-22

## 2022-10-18 NOTE — PROGRESS NOTES
HISTORY OF PRESENT ILLNESS  Ming Guzman is a 78 y.o. male. Follow-up  Associated symptoms include shortness of breath. Pertinent negatives include no chest pain, no abdominal pain and no headaches. Shortness of Breath  Pertinent negatives include no fever, no headaches, no cough, no wheezing, no PND, no orthopnea, no chest pain, no vomiting, no abdominal pain, no rash, no leg swelling and no claudication. Hospital Follow Up  Associated symptoms include shortness of breath. Pertinent negatives include no chest pain, no abdominal pain and no headaches. Patient presents for a post hospital follow-up office visit. He was previously followed by Dr. Mariam Mcmahon up until his [facility]. He has a past medical history significant for persistent atrial fibrillation, moderate nonobstructive coronary disease and moderate aortic valve stenosis. He is also a longtime smoker with a 50-pack-year smoking history still smoking half a pack of cigarettes a day. As result he does have significant COPD. He underwent a repeat echocardiogram in June 2021 which showed preserved LV function, EF 55 to 60% with moderate aortic valve stenosis and a mean valve gradient 31 mmHg. This was a slight increase compared to the year prior where his mean gradient was 25 mmHg. His last cardiac catheterization was in 2018 which demonstrated moderate nonobstructive CAD involving his left main and mid LAD along with his mid left circumflex. His mean gradient was 25 mmHg at that time. He then underwent a follow-up echocardiogram and pharmacologic nuclear stress test in June 2022. His nuclear stress test was a normal low risk study, no evidence of ischemia or infarct, EF 52%. His echocardiogram showed significant increase in his aortic valve gradient, now in the severe range: Mean gradient 43 mmHg, calculated TEDDY 0.7 cm² with preserved LV function, EF 55 to 60%.     He was complaining of worsening shortness of breath with any physical activity concerning for symptomatic aortic valve stenosis, so he was referred to the structural cardiologist at [facility] for TAVR evaluation. Patient ultimately underwent this procedure at the end of August 2022 using a 25 mm Portico stent valve. He was hospitalized 3 weeks later complaining of chest pain, worsening shortness of breath and was found to be in atrial fibrillation with rapid rates. He states he was not taking his metoprolol as prescribed. His medications were adjusted. He underwent a repeat echocardiogram which showed a normal functioning bioprosthetic TAVR in the aortic position with a mild perivalvular leak. He wore a 30-day event monitor which he completed at the end of September 2022 which showed atrial fibrillation 94% of the time with an average heart rate of 79 bpm and a range from 49 to 174 bpm.  There were no prolonged pauses greater than 3 seconds. He reports he was just seen in follow-up with the structural heart team a week and a half ago. Patient states his activity tolerance is much better since his TAVR. His shortness of breath has significantly improved with exertion. He denies any chest pain, heart palpitations, dizzy spells, nor syncope. No significant leg swelling. Past Medical History:   Diagnosis Date    Atrial fibrillation ([facility] 1998    CAD (coronary artery disease)     Cardiac nuclear imaging test, normal 04/01/2016    Low risk. No ischemia or prior infarction. EF 52%. Neg EKG on pharm stress test.    Diabetes ([address]     DM (diabetes mellitus) ([facility]     GERD (gastroesophageal reflux disease)     Hematuria 2001    HLD (hyperlipidemia)     HTN (hypertension)     HTN (hypertension)     Hypothyroid     Pancreatitis      Current Outpatient Medications   Medication Sig Dispense Refill    dilTIAZem ER (TIAZAC) 180 mg capsule Take 180 mg by mouth daily. metoprolol tartrate (LOPRESSOR) 100 mg IR tablet Take 100 mg by mouth two (2) times a day. polyethylene glycol (MIRALAX) 17 gram/dose powder Take 17 g by mouth daily as needed. tiotropium bromide (SPIRIVA RESPIMAT) 2.5 mcg/actuation inhaler Take 2 Puffs by inhalation daily. cholecalciferol (Vitamin D3) (5000 Units/125 mcg) tab tablet Take 5,000 Units by mouth daily. levothyroxine (SYNTHROID) 125 mcg tablet Take 125 mcg by mouth Daily (before breakfast). clopidogreL (Plavix) 75 mg tab Take 1 Tablet by mouth daily. 30 Tablet 4    furosemide (LASIX) 20 mg tablet TAKE 1 TABLET BY MOUTH  DAILY 90 Tablet 3    rosuvastatin (CRESTOR) 20 mg tablet Take 20 mg by mouth nightly. vit A/vit C/vit E/zinc/copper (PRESERVISION AREDS PO) Take 1 Tablet by mouth two (2) times a day. acetaminophen (TYLENOL) 500 mg tablet Take 1,000 mg by mouth every six (6) hours as needed for Pain.      glimepiride (AMARYL) 4 mg tablet Take 4 mg by mouth two (2) times a day. nitroglycerin (NITROSTAT) 0.4 mg SL tablet 1 Tab by SubLINGual route every five (5) minutes as needed for Chest Pain. 1 Bottle 3    metFORMIN ER (GLUCOPHAGE XR) 500 mg tablet Take 500 mg by mouth two (2) times a day. warfarin (COUMADIN) 5 mg tablet Take 1 tablet by mouth once a day      albuterol (PROVENTIL HFA, VENTOLIN HFA, PROAIR HFA) 90 mcg/actuation inhaler Take 2 Puffs by inhalation every six (6) hours as needed for Wheezing. pantoprazole (PROTONIX) 40 mg tablet Take 40 mg by mouth two (2) times a day.        No Known Allergies     Social History     Tobacco Use    Smoking status: Every Day     Packs/day: 1.50     Years: 55.00     Pack years: 82.50     Types: Cigarettes    Smokeless tobacco: Never    Tobacco comments:     10 cigarettes per day lately   Vaping Use    Vaping Use: Never used   Substance Use Topics    Alcohol use: Yes     Comment: every other day    Drug use: No     Family History   Problem Relation Age of Onset    Diabetes Mother     Diabetes Father     Stroke Father          Review of Systems Constitutional:  Negative for chills, fever and weight loss. HENT:  Negative for nosebleeds. Eyes:  Negative for blurred vision and double vision. Respiratory:  Positive for shortness of breath. Negative for cough and wheezing. Cardiovascular:  Negative for chest pain, palpitations, orthopnea, claudication, leg swelling and PND. Gastrointestinal:  Negative for abdominal pain, heartburn, nausea and vomiting. Genitourinary:  Negative for dysuria and hematuria. Musculoskeletal:  Negative for falls and myalgias. Skin:  Negative for rash. Neurological:  Negative for dizziness, focal weakness and headaches. Endo/Heme/Allergies:  Does not bruise/bleed easily. Psychiatric/Behavioral:  Negative for substance abuse. Visit Vitals  /80 (BP 1 Location: Left upper arm, BP Patient Position: Sitting, BP Cuff Size: Adult)   Pulse 86   Ht 5' 9\" (1.753 m)   Wt 103.4 kg (228 lb)   SpO2 96%   BMI 33.67 kg/m²         Physical Exam  Constitutional:       Appearance: He is well-developed. HENT:      Head: Normocephalic and atraumatic. Eyes:      Conjunctiva/sclera: Conjunctivae normal.   Neck:      Vascular: No carotid bruit or JVD. Cardiovascular:      Rate and Rhythm: Normal rate. Rhythm irregularly irregular. Pulses: Normal pulses. Heart sounds: S1 normal and S2 normal. Heart sounds not distant. No murmur heard. No gallop. No S3 sounds. Pulmonary:      Breath sounds: Decreased breath sounds present. No wheezing, rhonchi or rales. Abdominal:      General: Bowel sounds are normal.      Palpations: Abdomen is soft. Tenderness: There is no abdominal tenderness. Musculoskeletal:         General: No swelling, tenderness or deformity. Cervical back: Neck supple. Skin:     General: Skin is warm and dry. Neurological:      General: No focal deficit present. Mental Status: He is alert and oriented to person, place, and time.    Psychiatric:         Mood and Affect: Mood normal.         Behavior: Behavior normal.     October 7, 2022 EKG: Atrial fibrillation, slow ventricular rate around 50, left anterior fascicular block, nonspecific IVCD with poor R wave progression, nonspecific T wave abnormality. Compared to the previous EKG, heart rate has decreased. ASSESSMENT and PLAN  Encounter Diagnoses   Name Primary? Severe aortic valve stenosis Yes    History of transcatheter aortic valve replacement (TAVR)     Persistent atrial fibrillation (HCC)     Type 2 diabetes with nephropathy (HCC)     Coronary artery disease involving native coronary artery of native heart without angina pectoris     Chronic obstructive pulmonary disease, unspecified COPD type (HCC)     Hyperlipidemia, unspecified hyperlipidemia type      Severe symptomatic aortic valve stenosis. Status post TAVR using a 25 mm Portico stent valve at the end of August 2022. Normal valve function on repeat echocardiogram last month. He was supposed to be started on clopidogrel 75 mg daily to take for a total of 6 months from his TAVR. I have sent a prescription to his pharmacy with 4 refills so that he can take this for the next 5 months. He continues to follow-up closely with structural cardiology. Longstanding persistent atrial fibrillation. Patient was readmitted to [facility] heart hospital 3 weeks following his TAVR for rapid atrial fibrillation and his rate slowing agents were adjusted. He is currently taking metoprolol tartrate onto milligrams twice daily and diltiazem  mg daily. His heart rate is well controlled on exam today. He wore a 30-day event monitor last month which showed average heart rate of 79 bpm.  He remains on warfarin for oral anticoagulation with a goal INR between 2 and 3. Nonobstructive coronary artery disease. This was last assessed by cardiac catheterization in 2018. He had an ostial 35% left main stenosis along with a 55% mid LAD lesion and a 40% circumflex lesion.   He recently completed a pharmacologic nuclear stress test in June 2022 which did not demonstrate any ischemia or infarct, EF 52%. Overall low risk. Dyslipidemia. Patient remains on rosuvastatin 20 mg daily. I prefer his LDL to be less than 70. This is being followed by his PCP. Hypertension. Patient blood pressure appears to be reasonably well controlled on his current medical regimen, all of which I would continue. Tobacco use disorder. Patient has a 50+ pack year smoking history. She states he quit smoking at the end of August 2022. He was congratulated and encouraged to remain tobacco free. COPD. Patient now follows up with a pulmonologist.  He has been started on multiple inhalers. He reports that he has not noted significant improvement since starting the inhalers. Follow-up in 6 months, sooner if needed.

## 2022-10-18 NOTE — PATIENT INSTRUCTIONS
Follow up with Dr. Rebecca Harvey in 6 months  Start Plavix (Clopidogrel) 75mg - take 1 tablet daily for 5 months

## 2022-10-26 ENCOUNTER — HOSPITAL ENCOUNTER (OUTPATIENT)
Dept: CARDIAC REHAB | Age: 77
Discharge: HOME | End: 2022-10-26
Payer: MEDICARE

## 2022-10-26 VITALS — BODY MASS INDEX: 33.82 KG/M2 | WEIGHT: 229 LBS

## 2022-10-26 PROCEDURE — 93798 PHYS/QHP OP CAR RHAB W/ECG: CPT

## 2022-10-26 NOTE — PROGRESS NOTES
CARDIAC REHAB INITIAL ITP FOR REVIEW AND SIGNATURE    Jordan Martin 78 y.o. presented to cardiac rehab for an intake and a six minute walk test today with a primary diagnosis of Aortic Valve stenosis. Patient's EF is 50%. Jordan Martin has a history of Diabetes Mellitus. Cardiac risk factors include diabetes mellitus, valvular heart disease, HTN, AFIB, Hyperlipidemia, CAD and these were reviewed with patient. Jordan Martin is  and lives with lives with their spouse. PHQ-9, depression score, is 1 and this is considered to be low. The result was discussed with patient who confirms score to be accurate and if above a 5, a copy of the results were sent to patient's PCP. Patient denied chest pain or SOB during 6 minute walk and the cardiac rhythm was in Atrial Fibrillation w/MVR. Jordan Martin will attend exercise and educational sessions 2-3 days a week in cardiac rehab for 36 sessions. Exceptions noted during intake include non compliant with BS checks. Goals for Rehab:    Patient name: Jordan Martin : 1945         Goals Comments   1. Pt will begin to monitor BS daily or more often by end of cardiac rehab. [x] initial  [] met                  [] not met  [] progressing  Will not take BS at home or here. Afraid of needles, consider alternative monitoring device   2.  Increase stamina and strength and start playing golf again by next cert   [x] initial  [] met                  [] not met  [] progressing            Amanda Quintero RN 10/26/2022 11:53 AM     Cardiac ITP    Flowsheet Row CR INTAKE from 10/26/2022 in [facility] 243   Treatment Diagnosis    Treatment Diagnosis 1 Valve  [aortic valve stenosis]   Referral Date 10/11/22   Significant Cardiovascular History --  [AFIB,HTN,CAD,HLD,STROKE]   Co-morbidities --   Individual Treatment Plan    ITP Visit Type Initial Assessment   1st Date of Exercise  10/26/22   ITP Next Review Date 22   Visit #/Total Visits 1/36   EF % 55 %   ITP Exercise, Psychosocial, Tobacco, Nutrition, Education   Exercise     Stages of Change Preparation   DASI Total Score 15.2   Assisted Devices None   Total Score 0   Test Six minute walk test   Exercise Prescription    Mode Treadmill, Bike, Stepper, Ergometer, Rower, Other (comment)   Frequency per week 2-3   Duration per session 31-55   Intensity  METS       --   RPE 11-13   Progression --   Symptoms with Exercise --   Target Heart Rate --   Resistance Training Yes   Exercise Blood Pressures    Resting BP --   Peak BP --   Is BP WDL? --   Exercise Activity at Home    Type none   Exercise Education    Education Physically active, Warm up/Cool down, Equipment orientation, RPE scale, Signs/Symptoms to report, Exercise safety, Self pulse   Exercise Target Goal    Target Goal(s) Aerobic activity 30 + minutes/day 5 days/week, BP < 140/90 or < 130/80, if DM or CKD, Individual exercise RX   Psychosocial    Stages of Change --   [facility] Total Score 19   PHQ 9 Score 1   Psychosocial Intervention    Interventions --   Consults --   Currently Taking Psychotropic Meds --   Medication Changes --   Psychosocial Education    Education Signs/Symptoms of depression, Relaxation techniques, Impact self care behaviors on health, Coping techniques, Environmental triggers, Benefits of CPR completion, Advanced directives, Cardiac meds   Psychosocial Target Goals    Target Goal(s) --   Uses Stress Mgmt Techniques --   Patient Stated Psychosocial Goals --   Nutrition    Stages of Change --   Diabetes Yes   HbA1C 7.5   HbA1C Date --   BG at Home --   Diabetes Med(s) --   Diabetes Med(s) Change --   Lipids    HDL --   LDL --   Triglycerides --   Weight Management    Weight  --   Height  --   BMI --   Waist Circumference  --   Alcohol Special   Type whiskey   Amount 1-2   Rate Your Plate Total Score 32   Nutrition Intervention    Dietitian Consult --   Nurse/Patient Discussion --   Nutrition Class --   Diabetes Education Referral --   Lipid Clinic Referral --   Nutrition Education    Education Other (comment), Special diet, Healthy eating   Nutrition Target Goals    Target Goals Weight loss of 5-10%, Waist size less than 40 inches males and less than 35 inches for females, BMI less than 25, HbA1C less than 7 percent   Education    Learning Barrier Ready to learn   Education Intervention    Education Schedule Given Yes   Patient Education     Education Signs/Symptoms of Angina, Cardiac A&P, Med Compliance, Risk factors, CAD   Education Target Goals    Target Goals Medication compliance, Risk factors, Understand target guidelines for B/P, Understand target guidelines for lipids   Physician Response      Exercise    Flowsheet Row CR INTAKE from 10/26/2022 in [address] Common Questions    Any problems changes since your last visit Other (comment)  [new intake]   Any symptoms while exercising Denies   Psychosocial/Stress Level 0/10   Resting EKG rhythm Afib   Tobacco Use Quit (add date in comments)   Enter O2 Saturation and Liter Flow 98/RA   ITP Next Review Date 11/22/22   Visit Number/Total Visits 1/36   What is plan for next session exercise for 35-55   On Call Medical Director Immediately Available [facility]   Exercise Treatment Log    Target Heart Rate(Range)    Resting HR 71   Resting /76   Recovery HR 71   Recovery /61   Weight 103.9 kg (229 lb)   Exercise EKG Rhythm Afib with MVR   Exercise Duration 6 min   Peak HR 94   Peak /76   Peak RPE 12   Peak Mets 1.8   SOB Denies   Angina Denies   Claudulation Denies   Asymptomatic Yes   O2 Saturation 97   Total Minutes 16

## 2022-10-26 NOTE — PROGRESS NOTES
CARDIAC REHAB INITIAL ITP FOR REVIEW AND SIGNATURE    Ming Guzman 78 y.o. presented to cardiac rehab for an intake and a six minute walk test today with a primary diagnosis of Aortic Valve stenosis. Patient's EF is 50%. Ming Guzman has a history of Diabetes Mellitus. Cardiac risk factors include diabetes mellitus, valvular heart disease, HTN, AFIB, Hyperlipidemia, CAD and these were reviewed with patient. Ming Guzman is  and lives with lives with their spouse. PHQ-9, depression score, is 1 and this is considered to be low. The result was discussed with patient who confirms score to be accurate and if above a 5, a copy of the results were sent to patient's PCP. Patient denied chest pain or SOB during 6 minute walk and the cardiac rhythm was in Atrial Fibrillation w/MVR. Ming Guzman will attend exercise and educational sessions 2-3 days a week in cardiac rehab for 36 sessions. Exceptions noted during intake include non compliant with BS checks. Goals for Rehab:    Patient name: Ming Guzman : 1945         Goals Comments   1. Pt will begin to monitor BS daily or more often by end of cardiac rehab. [x] initial  [] met                  [] not met  [] progressing  Will not take BS at home or here. Afraid of needles, consider alternative monitoring device   2.  Increase stamina and strength and start playing golf again by next cert   [x] initial  [] met                  [] not met  [] progressing            Lynne Gandhi RN 10/26/2022 11:53 AM     Cardiac ITP    Flowsheet Row CR INTAKE from 10/26/2022 in [facility] 163   Treatment Diagnosis    Treatment Diagnosis 1 Valve  [aortic valve stenosis]   Referral Date 10/11/22   Significant Cardiovascular History --  [AFIB,HTN,CAD,HLD,STROKE]   Co-morbidities --   Individual Treatment Plan    ITP Visit Type Initial Assessment   1st Date of Exercise  10/26/22   ITP Next Review Date 22   Visit #/Total Visits 1/36   EF % 55 %   ITP Exercise, Psychosocial, Tobacco, Nutrition, Education   Exercise     Stages of Change Preparation   DASI Total Score 15.2   Assisted Devices None   Total Score 0   Test Six minute walk test   Exercise Prescription    Mode Treadmill, Bike, Stepper, Ergometer, Rower, Other (comment)   Frequency per week 2-3   Duration per session 31-55   Intensity  METS       --   RPE 11-13   Progression --   Symptoms with Exercise --   Target Heart Rate --   Resistance Training Yes   Exercise Blood Pressures    Resting BP --   Peak BP --   Is BP WDL? --   Exercise Activity at Home    Type none   Exercise Education    Education Physically active, Warm up/Cool down, Equipment orientation, RPE scale, Signs/Symptoms to report, Exercise safety, Self pulse   Exercise Target Goal    Target Goal(s) Aerobic activity 30 + minutes/day 5 days/week, BP < 140/90 or < 130/80, if DM or CKD, Individual exercise RX   Psychosocial    Stages of Change --   [facility] Total Score 19   PHQ 9 Score 1   Psychosocial Intervention    Interventions --   Consults --   Currently Taking Psychotropic Meds --   Medication Changes --   Psychosocial Education    Education Signs/Symptoms of depression, Relaxation techniques, Impact self care behaviors on health, Coping techniques, Environmental triggers, Benefits of CPR completion, Advanced directives, Cardiac meds   Psychosocial Target Goals    Target Goal(s) --   Uses Stress Mgmt Techniques --   Patient Stated Psychosocial Goals --   Nutrition    Stages of Change --   Diabetes Yes   HbA1C 7.5   HbA1C Date --   BG at Home --   Diabetes Med(s) --   Diabetes Med(s) Change --   Lipids    HDL --   LDL --   Triglycerides --   Weight Management    Weight  --   Height  --   BMI --   Waist Circumference  --   Alcohol Special   Type whiskey   Amount 1-2   Rate Your Plate Total Score 32   Nutrition Intervention    Dietitian Consult --   Nurse/Patient Discussion --   Nutrition Class --   Diabetes Education Referral --   Lipid Clinic Referral --   Nutrition Education    Education Other (comment), Special diet, Healthy eating   Nutrition Target Goals    Target Goals Weight loss of 5-10%, Waist size less than 40 inches males and less than 35 inches for females, BMI less than 25, HbA1C less than 7 percent   Education    Learning Barrier Ready to learn   Education Intervention    Education Schedule Given Yes   Patient Education     Education Signs/Symptoms of Angina, Cardiac A&P, Med Compliance, Risk factors, CAD   Education Target Goals    Target Goals Medication compliance, Risk factors, Understand target guidelines for B/P, Understand target guidelines for lipids   Physician Response      Exercise    Flowsheet Row CR INTAKE from 10/26/2022 in [address] Common Questions    Any problems changes since your last visit Other (comment)  [new intake]   Any symptoms while exercising Denies   Psychosocial/Stress Level 0/10   Resting EKG rhythm Afib   Tobacco Use Quit (add date in comments)   Enter O2 Saturation and Liter Flow 98/RA   ITP Next Review Date 11/22/22   Visit Number/Total Visits 1/36   What is plan for next session exercise for 35-55   On Call Medical Director Immediately Available [facility]   Exercise Treatment Log    Target Heart Rate(Range)    Resting HR 71   Resting /76   Recovery HR 71   Recovery /61   Weight 103.9 kg (229 lb)   Exercise EKG Rhythm Afib with MVR   Exercise Duration 6 min   Peak HR 94   Peak /76   Peak RPE 12   Peak Mets 1.8   SOB Denies   Angina Denies   Claudulation Denies   Asymptomatic Yes   O2 Saturation 97   Total Minutes 16

## 2022-11-01 ENCOUNTER — HOSPITAL ENCOUNTER (OUTPATIENT)
Dept: CARDIAC REHAB | Age: 77
Discharge: HOME | End: 2022-11-01
Payer: MEDICARE

## 2022-11-01 VITALS — BODY MASS INDEX: 33.82 KG/M2 | WEIGHT: 229 LBS

## 2022-11-01 PROCEDURE — 93798 PHYS/QHP OP CAR RHAB W/ECG: CPT

## 2022-11-03 ENCOUNTER — HOSPITAL ENCOUNTER (OUTPATIENT)
Dept: CARDIAC REHAB | Age: 77
Discharge: HOME | End: 2022-11-03
Payer: MEDICARE

## 2022-11-03 VITALS — BODY MASS INDEX: 33.82 KG/M2 | WEIGHT: 229 LBS

## 2022-11-03 PROCEDURE — 93798 PHYS/QHP OP CAR RHAB W/ECG: CPT

## 2022-11-08 ENCOUNTER — HOSPITAL ENCOUNTER (OUTPATIENT)
Dept: CARDIAC REHAB | Age: 77
Discharge: HOME | End: 2022-11-08
Payer: MEDICARE

## 2022-11-08 VITALS — BODY MASS INDEX: 34.26 KG/M2 | WEIGHT: 232 LBS

## 2022-11-08 PROCEDURE — 93798 PHYS/QHP OP CAR RHAB W/ECG: CPT

## 2022-11-10 ENCOUNTER — HOSPITAL ENCOUNTER (OUTPATIENT)
Dept: CARDIAC REHAB | Age: 77
Discharge: HOME | End: 2022-11-10
Payer: MEDICARE

## 2022-11-10 VITALS — BODY MASS INDEX: 34.11 KG/M2 | WEIGHT: 231 LBS

## 2022-11-10 PROCEDURE — 93798 PHYS/QHP OP CAR RHAB W/ECG: CPT

## 2022-11-15 ENCOUNTER — HOSPITAL ENCOUNTER (OUTPATIENT)
Dept: CARDIAC REHAB | Age: 77
Discharge: HOME | End: 2022-11-15
Payer: MEDICARE

## 2022-11-15 VITALS — WEIGHT: 233 LBS | BODY MASS INDEX: 34.41 KG/M2

## 2022-11-15 PROCEDURE — 93798 PHYS/QHP OP CAR RHAB W/ECG: CPT

## 2022-11-17 ENCOUNTER — HOSPITAL ENCOUNTER (OUTPATIENT)
Dept: CARDIAC REHAB | Age: 77
Discharge: HOME | End: 2022-11-17
Payer: MEDICARE

## 2022-11-17 VITALS — BODY MASS INDEX: 33.67 KG/M2 | WEIGHT: 228 LBS

## 2022-11-17 PROCEDURE — 93798 PHYS/QHP OP CAR RHAB W/ECG: CPT

## 2022-11-22 ENCOUNTER — APPOINTMENT (OUTPATIENT)
Dept: CARDIAC REHAB | Age: 77
End: 2022-11-22
Payer: MEDICARE

## 2022-11-22 ENCOUNTER — TELEPHONE (OUTPATIENT)
Dept: CARDIOLOGY CLINIC | Age: 77
End: 2022-11-22

## 2022-11-22 NOTE — TELEPHONE ENCOUNTER
Received message from patient to call him. No reason or complaint given. Attempted to contact patient multiple times and left message to call our office with his concerns.

## 2022-11-23 NOTE — PROGRESS NOTES
CARDIAC REHAB ITP REASSESSMENT FOR REVIEW AND SIGNATURE  Patient name: Saulo Srivastava : 1945     Visits from Start of Care:       Reporting Period: 10/26/22-22            Goals Comments   1. Pt will begin to monitor BS daily or more often by end of cardiac rehab. [x] initial  [] met                      [x] not met  [] progressing  Will not take BS at home or here. Afraid of needles, consider alternative monitoring device   2. Increase stamina and strength and start playing golf again by next cert    [] initial  [] met                      [] not met  [x] progressing Meeting THR and BP goals            3. Quit smoking by end of program    [x] initial  [] met                      [] not met  [] progressing Provide resources, support and education to help quit smoking     Key functional changes:     Problems/ barriers to goal attainment: not willing to take bs afraid of needles. Continues to smoke cigarettes    Assessment / Recommendations:Continue with rehab.  See above goals     Updated Goals to be progressing by next ITP and competed by end of program     Frequency / Duration: Patient to be seen 2-3 times per week for total of 36 weeks:    Maite Beasley RN 2022 12:08 PM     Cardiac ITP    Flowsheet Row [facility]   Treatment Diagnosis    Treatment Diagnosis 1 Valve   Referral Date 10/11/22   Significant Cardiovascular History --  [AFIB,HTN,CAD,HLD,STROKE]   Individual Treatment Plan    ITP Visit Type Initial Assessment   1st Date of Exercise  10/26/22   ITP Next Review Date 22   Visit #/Total Visits    EF % 55 %   ITP Exercise, Psychosocial, Tobacco, Nutrition, Education   Exercise     Stages of Change Preparation   Assisted Devices None   Test Six minute walk test   Exercise Prescription    Mode Treadmill, Bike, Stepper, Ergometer, Rower, Other (comment)   Frequency per week 2-3   Duration per session 31-55   RPE 11-13   Resistance Training Yes   Exercise Blood Pressures    Exercise Activity at Home    Type none   Exercise Education    Education Physically active, Warm up/Cool down, Equipment orientation, RPE scale, Signs/Symptoms to report, Exercise safety, Self pulse   Exercise Target Goal    Target Goal(s) Aerobic activity 30 + minutes/day 5 days/week, BP < 140/90 or < 130/80, if DM or CKD, Individual exercise RX   Psychosocial    Psychosocial Intervention    Psychosocial Education    Education Signs/Symptoms of depression, Relaxation techniques, Impact self care behaviors on health, Coping techniques, Environmental triggers, Benefits of CPR completion, Advanced directives, Cardiac meds   Psychosocial Target Goals    Nutrition    Diabetes Yes   HbA1C 7.5   Lipids    Weight Management    Alcohol Special   Type whiskey   Amount 1-2   Nutrition Intervention    Nutrition Education    Nutrition Target Goals    Target Goals Weight loss of 5-10%, Waist size less than 40 inches males and less than 35 inches for females, BMI less than 25, HbA1C less than 7 percent   Education    Learning Barrier Ready to learn   Education Intervention    Education Schedule Given Yes   Patient Education     Education Signs/Symptoms of Angina, Cardiac A&P, Med Compliance, Risk factors, CAD   Education Target Goals    Target Goals Medication compliance, Risk factors, Understand target guidelines for B/P, Understand target guidelines for lipids   Physician Response      Exercise    Flowsheet Row CR PHASE II from 11/17/2022 in [address] Common Questions    Any problems changes since your last visit Denies   Any symptoms while exercising denies   Psychosocial/Stress Level 0   Resting EKG rhythm Controlled AFib   Tobacco Use Current   Tobacco Cessation Interventions Verbal counseling   ITP Next Review Date 12/22/22   Visit Number/Total Visits 7/36   On Call Medical Director Immediately Available [facility]   Exercise Treatment Log    Target Heart Rate(Range)    Resting HR 80   Resting /77   Recovery HR 82   Recovery /74   Weight 103.4 kg (228 lb)   Exercise EKG Rhythm Controlled AFib   Exercise Duration 35   Peak HR 99   Peak RPE 13   Peak Mets 1.8   SOB 0   Angina 0   Claudulation 0   Asymptomatic yes   Total Minutes 45

## 2022-12-01 ENCOUNTER — HOSPITAL ENCOUNTER (OUTPATIENT)
Dept: CARDIAC REHAB | Age: 77
Discharge: HOME | End: 2022-12-01
Payer: MEDICARE

## 2022-12-01 VITALS — WEIGHT: 226 LBS | BODY MASS INDEX: 33.37 KG/M2

## 2022-12-01 PROCEDURE — 93798 PHYS/QHP OP CAR RHAB W/ECG: CPT

## 2022-12-06 ENCOUNTER — HOSPITAL ENCOUNTER (OUTPATIENT)
Dept: CARDIAC REHAB | Age: 77
Discharge: HOME | End: 2022-12-06
Payer: MEDICARE

## 2022-12-06 VITALS — WEIGHT: 227 LBS | BODY MASS INDEX: 33.52 KG/M2

## 2022-12-06 PROCEDURE — 93798 PHYS/QHP OP CAR RHAB W/ECG: CPT

## 2022-12-08 ENCOUNTER — HOSPITAL ENCOUNTER (OUTPATIENT)
Dept: CARDIAC REHAB | Age: 77
Discharge: HOME | End: 2022-12-08
Payer: MEDICARE

## 2022-12-08 VITALS — BODY MASS INDEX: 33.67 KG/M2 | WEIGHT: 228 LBS

## 2022-12-08 PROCEDURE — 93798 PHYS/QHP OP CAR RHAB W/ECG: CPT

## 2022-12-13 ENCOUNTER — HOSPITAL ENCOUNTER (OUTPATIENT)
Dept: CARDIAC REHAB | Age: 77
Discharge: HOME | End: 2022-12-13
Payer: MEDICARE

## 2022-12-13 VITALS — WEIGHT: 227 LBS | BODY MASS INDEX: 33.52 KG/M2

## 2022-12-13 PROCEDURE — 93798 PHYS/QHP OP CAR RHAB W/ECG: CPT

## 2022-12-15 ENCOUNTER — HOSPITAL ENCOUNTER (OUTPATIENT)
Dept: CARDIAC REHAB | Age: 77
Discharge: HOME | End: 2022-12-15
Payer: MEDICARE

## 2022-12-15 VITALS — BODY MASS INDEX: 33.37 KG/M2 | WEIGHT: 226 LBS

## 2022-12-15 PROCEDURE — 93798 PHYS/QHP OP CAR RHAB W/ECG: CPT

## 2022-12-20 ENCOUNTER — APPOINTMENT (OUTPATIENT)
Dept: CARDIAC REHAB | Age: 77
End: 2022-12-20
Payer: MEDICARE

## 2022-12-22 NOTE — PROGRESS NOTES
CARDIAC REHAB ITP REASSESSMENT FOR REVIEW AND SIGNATURE  Patient name: Abdelrahman Galicia : 1945     Visits from Start of Care:       Reporting Period: 2022 to 2022    Subjective Reports: Pt has been progressing, is vocalizing less shortness of breath prior to exercise. Goals Comments   1. Increase endurance and stamina by end of program     [] met                  [] not met  [x] progressing Pt will be able to complete 45 minutes of exercise without c/o shortness of breath or fatigue. 2. Minimize episodes of shortness of breath (SOB) or fatigue by end of program    [] met                  [] not met  [x] progressing Pt will verbalize being able to complete their usual activities of daily living without feeling SOB or fatigued. 3. Incorporate Heart Healthy Lifestyle by end of program     [] met                  [] not met  [x] progressing Pt will verbalize making healthy lifestyle changes that include but not limited to eating a heart healthy diet which includes whole foods such as fresh fruits and vegetables, incorporating cardiovascular exercise at home and stress management. 4. Maintain target heart rate (THR) during exercise by end of program    [] met                  [] not met  [x] progressing Pt will be able to exercise and maintain THR between  during each session to maximize cardiac benefits. Key functional changes: Pt has been able to complete 45 minutes of exercise, and had been consistent with rehab. Problems/ barriers to goal attainment: Pt is encouraged to have smoking cessation to help with goals of decreased shortness of breath. Assessment / Recommendations: Continue w/ rehab twice weekly to improve cardiac function.        Cardiac ITP  22 1537   Treatment Diagnosis   Treatment Diagnosis 1 Valve   Referral Date 10/11/22   Significant Cardiovascular History   (AFIB,HTN,CAD,HLD,STROKE)   OXYGEN INTERVENTION   Oxygen Use No Individual Treatment Plan   ITP Visit Type Re-Assessment   1st Date of Exercise  10/26/22   ITP Next Review Date 01/20/23   Visit #/Total Visits 12/36   EF % 55 %   ITP Exercise;Psychosocial;Tobacco;Nutrition;Education   Exercise    Stages of Change Action   Assisted Devices None   Test Six minute walk test   Data Measured during Walk   Peak RPD 6   Data Measured Immediately After Walk   Distance Walked in  ft   Exercise Prescription   Mode Treadmill;Bike;Stepper;Ergometer; Rower; Other (comment)   Frequency per week 2-3   Duration per session 31-55   RPE 11-13   Progression Progressing   Target Heart Rate    Resistance Training Yes   Exercise Blood Pressures   Resting /71   Peak BP 96/59   Is BP WDL? Yes   Exercise Activity at Home   Type none   Exercise Education   Education Physically active; Warm up/Cool down;Equipment orientation;RPE scale;Signs/Symptoms to report; Exercise safety;Self pulse   Exercise Target Goal   Target Goal(s) Aerobic activity 30 + minutes/day  5 days/week;BP < 140/90 or < 130/80, if DM or CKD; Individual exercise RX   Psychosocial Intervention   Interventions No intervention indicated   Psychosocial Education   Education Signs/Symptoms of depression;Relaxation techniques; Impact self care behaviors on health;Coping techniques; Environmental triggers; Benefits of CPR completion; Advanced directives;Cardiac meds   Tobacco   Stages of Change Action   Tobacco Use Yes   # Cigarette Smoked/Day 10   Smokeless Tobacco Use No   Nutrition   Diabetes Yes   HbA1C 7.5   Stages of Change Action   Lipids   Lipid Med(s) Crestor   Lipid Med Change(s) No   Weight Management   Alcohol Special   Type whiskey   Amount 1-2   Nutrition Assessment Tool Rate Your Plate   Weight  804.3 kg (226 lb)   Height  5' 9\" (1.753 m)   BMI 33.44   Nutrition Education   Education Low sodium diet; Healthy eating;Special diet   Nutrition Target Goals   Target Goals Weight loss of 5-10%; Waist size less than 40 inches males and less than 35 inches for females;BMI less than 25;HbA1C less than 7 percent   Education   Learning Barrier Ready to learn   Education Intervention   Education Schedule Given Yes   Patient Education    Education Signs/Symptoms of Angina;Cardiac A&P; Med Compliance; Risk factors;CAD   Hypertension No   Is BP WDL? Yes   Med(s) Change No   BP Meds Diltiazem; Metoprolol   ACE/ARB Prescribed No   ASA Prescribed No   BB Prescribed Yes   Statins Prescribed Yes   BB Adherent Yes   Statin Intensity High   Statins Adherent Yes   Education Target Goals   Target Goals Medication compliance; Risk factors; Understand target guidelines for B/P;Understand target guidelines for lipids   Treatment Goals   Goals Exercise   Exercise Goal Decrease shortness of breath and increase stamina   Exercise Goal Status Progressing         Exercise Log  12/15/22 1112   Rehab Common Questions   Any problems changes since your last visit Denies   Any symptoms while exercising Denies   Psychosocial/Stress Level 0   Resting EKG rhythm Controlled AFib   Tobacco Use Current   Tobacco Cessation Interventions Verbal counseling   ITP Next Review Date 11/22/22   Visit Number/Total Visits 12/36   On Call Medical Director Immediately Available [place]   Exercise Treatment Log   Target Heart Rate(Range)    Resting HR 85   Resting /71   Recovery HR 79   Recovery BP 96/59   Weight 102.5 kg (226 lb)   Exercise EKG Rhythm Controlled AFib   Exercise Duration 41   Peak HR 99   Peak RPE 12   Peak Mets 1.8   SOB 0   Angina 0   Claudulation 0   Asymptomatic yes   Total Minutes 2601 Suze Bojorquez RN 12/22/2022 3:30 PM

## 2022-12-27 ENCOUNTER — APPOINTMENT (OUTPATIENT)
Dept: CARDIAC REHAB | Age: 77
End: 2022-12-27
Payer: MEDICARE

## 2023-01-10 ENCOUNTER — HOSPITAL ENCOUNTER (OUTPATIENT)
Dept: CARDIAC REHAB | Age: 78
End: 2023-01-10

## 2023-01-12 ENCOUNTER — HOSPITAL ENCOUNTER (OUTPATIENT)
Dept: CARDIAC REHAB | Age: 78
End: 2023-01-12

## 2023-01-17 ENCOUNTER — APPOINTMENT (OUTPATIENT)
Dept: CARDIAC REHAB | Age: 78
End: 2023-01-17

## 2023-01-19 ENCOUNTER — APPOINTMENT (OUTPATIENT)
Dept: CARDIAC REHAB | Age: 78
End: 2023-01-19

## 2023-01-22 RX ORDER — FUROSEMIDE 20 MG/1
TABLET ORAL
Qty: 90 TABLET | Refills: 3 | Status: SHIPPED | OUTPATIENT
Start: 2023-01-22

## 2023-01-24 ENCOUNTER — APPOINTMENT (OUTPATIENT)
Dept: CARDIAC REHAB | Age: 78
End: 2023-01-24

## 2023-01-26 ENCOUNTER — APPOINTMENT (OUTPATIENT)
Dept: CARDIAC REHAB | Age: 78
End: 2023-01-26

## 2023-01-31 ENCOUNTER — APPOINTMENT (OUTPATIENT)
Dept: CARDIAC REHAB | Age: 78
End: 2023-01-31

## 2023-02-02 ENCOUNTER — APPOINTMENT (OUTPATIENT)
Dept: CARDIAC REHAB | Age: 78
End: 2023-02-02

## 2023-04-25 ENCOUNTER — OFFICE VISIT (OUTPATIENT)
Age: 78
End: 2023-04-25
Payer: MEDICARE

## 2023-04-25 VITALS
HEIGHT: 69 IN | DIASTOLIC BLOOD PRESSURE: 70 MMHG | BODY MASS INDEX: 33.03 KG/M2 | WEIGHT: 223 LBS | HEART RATE: 79 BPM | SYSTOLIC BLOOD PRESSURE: 128 MMHG | OXYGEN SATURATION: 97 %

## 2023-04-25 DIAGNOSIS — J44.9 CHRONIC OBSTRUCTIVE PULMONARY DISEASE, UNSPECIFIED COPD TYPE (HCC): ICD-10-CM

## 2023-04-25 DIAGNOSIS — E78.5 HYPERLIPIDEMIA, UNSPECIFIED HYPERLIPIDEMIA TYPE: ICD-10-CM

## 2023-04-25 DIAGNOSIS — I25.10 CORONARY ARTERY DISEASE INVOLVING NATIVE CORONARY ARTERY OF NATIVE HEART WITHOUT ANGINA PECTORIS: ICD-10-CM

## 2023-04-25 DIAGNOSIS — I35.0 SEVERE AORTIC VALVE STENOSIS: ICD-10-CM

## 2023-04-25 DIAGNOSIS — E11.21 TYPE 2 DIABETES WITH NEPHROPATHY (HCC): ICD-10-CM

## 2023-04-25 DIAGNOSIS — Z95.2 HISTORY OF TRANSCATHETER AORTIC VALVE REPLACEMENT (TAVR): ICD-10-CM

## 2023-04-25 DIAGNOSIS — I48.19 ATRIAL FIBRILLATION, PERSISTENT (HCC): ICD-10-CM

## 2023-04-25 DIAGNOSIS — Z95.2 HISTORY OF TRANSCATHETER AORTIC VALVE REPLACEMENT (TAVR): Primary | ICD-10-CM

## 2023-04-25 PROCEDURE — 3078F DIAST BP <80 MM HG: CPT | Performed by: INTERNAL MEDICINE

## 2023-04-25 PROCEDURE — G8427 DOCREV CUR MEDS BY ELIG CLIN: HCPCS | Performed by: INTERNAL MEDICINE

## 2023-04-25 PROCEDURE — 3023F SPIROM DOC REV: CPT | Performed by: INTERNAL MEDICINE

## 2023-04-25 PROCEDURE — G8417 CALC BMI ABV UP PARAM F/U: HCPCS | Performed by: INTERNAL MEDICINE

## 2023-04-25 PROCEDURE — 93000 ELECTROCARDIOGRAM COMPLETE: CPT | Performed by: INTERNAL MEDICINE

## 2023-04-25 PROCEDURE — 99214 OFFICE O/P EST MOD 30 MIN: CPT | Performed by: INTERNAL MEDICINE

## 2023-04-25 PROCEDURE — 3074F SYST BP LT 130 MM HG: CPT | Performed by: INTERNAL MEDICINE

## 2023-04-25 PROCEDURE — 1123F ACP DISCUSS/DSCN MKR DOCD: CPT | Performed by: INTERNAL MEDICINE

## 2023-04-25 PROCEDURE — 4004F PT TOBACCO SCREEN RCVD TLK: CPT | Performed by: INTERNAL MEDICINE

## 2023-04-25 RX ORDER — METOPROLOL SUCCINATE 100 MG/1
100 TABLET, EXTENDED RELEASE ORAL DAILY
COMMUNITY

## 2023-04-25 RX ORDER — ORAL SEMAGLUTIDE 7 MG/1
7 TABLET ORAL DAILY
COMMUNITY
Start: 2023-02-21

## 2023-04-25 RX ORDER — FLUTICASONE FUROATE, UMECLIDINIUM BROMIDE AND VILANTEROL TRIFENATATE 200; 62.5; 25 UG/1; UG/1; UG/1
1 POWDER RESPIRATORY (INHALATION) DAILY
COMMUNITY
Start: 2023-01-31

## 2023-04-25 ASSESSMENT — ENCOUNTER SYMPTOMS
NAUSEA: 0
VOMITING: 0
ABDOMINAL DISTENTION: 0
COUGH: 0
SORE THROAT: 0
SHORTNESS OF BREATH: 1
ABDOMINAL PAIN: 0

## 2023-04-25 ASSESSMENT — ANXIETY QUESTIONNAIRES
5. BEING SO RESTLESS THAT IT IS HARD TO SIT STILL: 0
4. TROUBLE RELAXING: 0
GAD7 TOTAL SCORE: 0
6. BECOMING EASILY ANNOYED OR IRRITABLE: 0
1. FEELING NERVOUS, ANXIOUS, OR ON EDGE: 0
3. WORRYING TOO MUCH ABOUT DIFFERENT THINGS: 0
7. FEELING AFRAID AS IF SOMETHING AWFUL MIGHT HAPPEN: 0
2. NOT BEING ABLE TO STOP OR CONTROL WORRYING: 0

## 2023-04-25 ASSESSMENT — PATIENT HEALTH QUESTIONNAIRE - PHQ9
SUM OF ALL RESPONSES TO PHQ QUESTIONS 1-9: 0
SUM OF ALL RESPONSES TO PHQ9 QUESTIONS 1 & 2: 0
SUM OF ALL RESPONSES TO PHQ QUESTIONS 1-9: 0
1. LITTLE INTEREST OR PLEASURE IN DOING THINGS: 0
SUM OF ALL RESPONSES TO PHQ QUESTIONS 1-9: 0
SUM OF ALL RESPONSES TO PHQ QUESTIONS 1-9: 0
2. FEELING DOWN, DEPRESSED OR HOPELESS: 0

## 2023-04-25 NOTE — PROGRESS NOTES
04/25/23     Malini Khan  is a 70 y.o. male     Chief Complaint   Patient presents with    Follow-up     6 month       HPI    Patient presents for a follow-up office visit. He was previously followed by Dr. Jerod Pretty up until his FDC. He has a past medical history significant for persistent atrial fibrillation, moderate nonobstructive coronary disease and moderate aortic valve stenosis. He is also a longtime smoker with a 50-pack-year smoking history still smoking half a pack of cigarettes a day. As result he does have significant COPD. He underwent a repeat echocardiogram in June 2021 which showed preserved LV function, EF 55 to 60% with moderate aortic valve stenosis and a mean valve gradient 31 mmHg. This was a slight increase compared to the year prior where his mean gradient was 25 mmHg. His last cardiac catheterization was in 2018 which demonstrated moderate nonobstructive CAD involving his left main and mid LAD along with his mid left circumflex. His mean gradient was 25 mmHg at that time. He then underwent a follow-up echocardiogram and pharmacologic nuclear stress test in June 2022. His nuclear stress test was a normal low risk study, no evidence of ischemia or infarct, EF 52%. His echocardiogram showed significant increase in his aortic valve gradient, now in the severe range: Mean gradient 43 mmHg, calculated MOISES 0.7 cm² with preserved LV function, EF 55 to 60%. He was referred to the structural cardiologist at [facility] for TAVR evaluation. Patient ultimately underwent this procedure at the end of August 2022 using a 25 mm Portico stent valve. He was hospitalized 3 weeks later complaining of chest pain, worsening shortness of breath and was found to be in atrial fibrillation with rapid rates. He states he was not taking his metoprolol as prescribed. His medications were adjusted.   He underwent a repeat echocardiogram which showed a normal functioning

## 2023-04-25 NOTE — PROGRESS NOTES
Michelle Laguerre presents today for   Chief Complaint   Patient presents with    Follow-up     6 month       Michelle Laguerre preferred language for health care discussion is english/other. Is someone accompanying this pt? no    Is the patient using any DME equipment during OV? no    Depression Screening:  Depression: Not at risk    PHQ-2 Score: 0        Learning Assessment:  Who is the primary learner? Patient    What is the preferred language for health care of the primary learner? ENGLISH    How does the primary learner prefer to learn new concepts? DEMONSTRATION    Answered By patient    Relationship to Learner SELF           Pt currently taking Anticoagulant therapy? no    Pt currently taking Antiplatelet therapy ? no      Coordination of Care:  1. Have you been to the ER, urgent care clinic since your last visit? Hospitalized since your last visit? no    2. Have you seen or consulted any other health care providers outside of the [address] [place] [address] since your last visit? Include any pap smears or colon screening.  no

## 2023-08-29 NOTE — Clinical Note
TRANSFER - IN REPORT:  
 
Verbal report received from: FRANCISCA Pelayo. Report consisted of patient's Situation, Background, Assessment and  
Recommendations(SBAR). Opportunity for questions and clarification was provided. Assessment completed upon patient's arrival to unit and care assumed. Patient transported with a Cardiac Cath Tech / Patient Care Tech. Biopsy Type: H and E

## 2024-07-26 RX ORDER — FUROSEMIDE 20 MG
TABLET ORAL
Qty: 90 TABLET | Refills: 3 | OUTPATIENT
Start: 2024-07-26

## 2024-09-20 NOTE — PROGRESS NOTES
INPATIENT NEPHROLOGY CONSULT   [facility] NEPHROLOGY    Tabby Howard  09/20/2024    Reason for consultation:    esrd    Chief Complaint:   Chief Complaint   Patient presents with    Flank Pain     Left side flank pain, N&V starting this morning          History of Present Illness:    Per H and P  Tabby Howard is a 37 year old female with a previous medical history of ESRD on dialysis Tue/Thur/Sat, chronic abdominal pain, HTN, Type II Diabetes, who presented to the ED  for left-sided flank pain. HPI is limited to chart review and ED note due to patient not being cooperative.  Per chart review, patient has multiple similar episodes in the past and has had multiple previous CT scans performed.  Also has had multiple hospitalizations for DKA in the past. Patient reports last dialysis was Monday 9/9/24 due to them scheduling her that day. She was unable to attend her normal Thursday session due to severe abdominal pain and vomiting. Of note she has had 5 ER visits over the past five days and most recent admission was on 9/10/24. She was admitted for DKA and insulin drip discontinued the next day. She received dialysis along with an EGD that showed mild gastritis. Initial ED evaluation showed elevated anion gap and betahydroxybutyrate but low normal PH.          Plan of Care:       Assessment:    esrd  --continue dialysis per routine  --fluid restrict  --renal dose medication per routine  --continue outpt medication  --continue binders with meals  --650 cc ultrafiltration last pm  --pt with 17 hospital admissions in last 12 months.   Perhaps a psychiatric evaluation is in order    Anemia  --erythropoiesis stimulating agent with renal replacement therapy    Hyperphosphatemia  --renal diet  --continue binders    Hypertension  --uf with hd  --fluid restrict  --low salt diet  --continue home medication    DKA  --as per primary           Thank you for allowing us to participate in this patient's care. We will continue to  Per your last office note, \"I have requested that he begin to weigh himself daily using tomorrow morning's weight as his baseline weight and if he gains or loses 3 pounds above or below that weight he should give our office a call and we will adjust his diuretics moving forward. He tells me he had some blood work completed by Dr. Manuel Frias recently and I am going to review of that but I am also going to do a BMP, BNP, and magnesium on him in 2 or 3 weeks to be sure that we are not over diuresing him and that his electrolytes are remaining within the normal range. \" follow.    Vital Signs:  Temp Readings from Last 3 Encounters:   24 98.4 °F (36.9 °C)   24 98.4 °F (36.9 °C) (Oral)   24 98 °F (36.7 °C)       Pulse Readings from Last 3 Encounters:   24 101   24 94   24 92       BP Readings from Last 3 Encounters:   24 (!) 169/90   24 122/75   24 (!) 180/99       Weight:  Wt Readings from Last 3 Encounters:   24 47.5 kg (104 lb 11.5 oz)   24 52.6 kg (116 lb)   24 52.6 kg (116 lb)       Past Medical & Surgical History:  Past Medical History:   Diagnosis Date    ESRD on hemodialysis 2022    Gastritis 2022    EGD was 22    Gastroparesis 2022    has not had Emptying study    Heart failure with preserved ejection fraction 2022    EF 55% on 3/22    History of supraventricular tachycardia     Hyperkalemia 2022    Hypertensive emergency 2022    Sickle cell trait 2022    Type 2 diabetes mellitus        Past Surgical History:   Procedure Laterality Date     SECTION      x 3    COLONOSCOPY      COLONOSCOPY N/A 2022    Procedure: COLONOSCOPY;  Surgeon: Jagdeep Cedeno MD;  Location: [facility];  Service: Endoscopy;  Laterality: N/A;    DESTRUCTION, CILIARY BODY, USING LASER Left 2024    Procedure: Cyclophotocoagulation G-probe, retrobulbar chlorpromazine left eye;  Surgeon: Fidel Wise MD;  Location: [address];  Service: Ophthalmology;  Laterality: Left;    ENDOSCOPIC ULTRASOUND OF UPPER GASTROINTESTINAL TRACT N/A 2023    Procedure: ULTRASOUND, UPPER GI TRACT, ENDOSCOPIC;  Surgeon: Micky Paredes III, MD;  Location: [facility];  Service: Endoscopy;  Laterality: N/A;    ESOPHAGOGASTRODUODENOSCOPY N/A 10/18/2019    Procedure: ESOPHAGOGASTRODUODENOSCOPY (EGD);  Surgeon: Gianluca Mendez MD;  Location: [facility];  Service: Endoscopy;  Laterality: N/A;    ESOPHAGOGASTRODUODENOSCOPY N/A 2022    Procedure: EGD (ESOPHAGOGASTRODUODENOSCOPY);   Surgeon: Micky Paredes III, MD;  Location: [facility] ENDO;  Service: Endoscopy;  Laterality: N/A;    ESOPHAGOGASTRODUODENOSCOPY N/A 12/05/2022    Procedure: EGD (ESOPHAGOGASTRODUODENOSCOPY);  Surgeon: Marcelo Zhong MD;  Location: [facility] ENDO;  Service: Endoscopy;  Laterality: N/A;    ESOPHAGOGASTRODUODENOSCOPY N/A 9/13/2024    Procedure: EGD (ESOPHAGOGASTRODUODENOSCOPY);  Surgeon: Marcelo Zhong MD;  Location: [facility] ENDO;  Service: Endoscopy;  Laterality: N/A;    EYE SURGERY      INSERTION, CATHETER, TUNNELED N/A 06/17/2023    Procedure: Insertion,catheter,tunneled;  Surgeon: Carlos Thurman Jr., MD;  Location: [facility];  Service: General;  Laterality: N/A;    LAPAROSCOPIC CHOLECYSTECTOMY N/A 07/30/2022    Procedure: CHOLECYSTECTOMY, LAPAROSCOPIC;  Surgeon: Grey Perez MD;  Location: [facility] OR;  Service: General;  Laterality: N/A;    PLACEMENT OF DUAL-LUMEN VASCULAR CATHETER Left 07/12/2022    Procedure: INSERTION-CATHETER-JOSEPH;  Surgeon: Dionte Gan MD;  Location: [facility] OR;  Service: General;  Laterality: Left;    PLACEMENT OF DUAL-LUMEN VASCULAR CATHETER Right 07/26/2022    Procedure: INSERTION-CATHETER-Hemosplit;  Surgeon: Dionte Gan MD;  Location: [facility] OR;  Service: General;  Laterality: Right;       Past Social History:  Social History     Socioeconomic History    Marital status:    Tobacco Use    Smoking status: Never    Smokeless tobacco: Never   Substance and Sexual Activity    Alcohol use: Not Currently    Drug use: No    Sexual activity: Not Currently     Partners: Male     Birth control/protection: I.U.D.     Social Determinants of Health     Financial Resource Strain: Low Risk  (8/29/2024)    Overall Financial Resource Strain (CARDIA)     Difficulty of Paying Living Expenses: Not hard at all   Recent Concern: Financial Resource Strain - Medium Risk (8/25/2024)    Overall Financial Resource Strain (CARDIA)     Difficulty of Paying Living Expenses: Somewhat hard   Food  Insecurity: No Food Insecurity (8/29/2024)    Hunger Vital Sign     Worried About Running Out of Food in the Last Year: Never true     Ran Out of Food in the Last Year: Never true   Recent Concern: Food Insecurity - Food Insecurity Present (8/25/2024)    Hunger Vital Sign     Worried About Running Out of Food in the Last Year: Often true     Ran Out of Food in the Last Year: Often true   Transportation Needs: No Transportation Needs (8/29/2024)    TRANSPORTATION NEEDS     Transportation : No   Physical Activity: Sufficiently Active (8/29/2024)    Exercise Vital Sign     Days of Exercise per Week: 5 days     Minutes of Exercise per Session: 30 min   Recent Concern: Physical Activity - Inactive (8/25/2024)    Exercise Vital Sign     Days of Exercise per Week: 0 days     Minutes of Exercise per Session: 0 min   Stress: No Stress Concern Present (8/29/2024)    [employer] [place] [employer] - Occupational Stress Questionnaire     Feeling of Stress : Not at all   Recent Concern: Stress - Stress Concern Present (8/25/2024)    [employer] [place] [employer] - Occupational Stress Questionnaire     Feeling of Stress : Rather much   Housing Stability: Low Risk  (8/29/2024)    Housing Stability Vital Sign     Unable to Pay for Housing in the Last Year: No     Homeless in the Last Year: No   Recent Concern: Housing Stability - High Risk (8/25/2024)    Housing Stability Vital Sign     Unable to Pay for Housing in the Last Year: Yes     Homeless in the Last Year: No       Medications:  No current facility-administered medications on file prior to encounter.     Current Outpatient Medications on File Prior to Encounter   Medication Sig Dispense Refill    albuterol (VENTOLIN HFA) 90 mcg/actuation inhaler Inhale 2 puffs every 4 hours by inhalation route. 8 g 2    apixaban (ELIQUIS) 5 mg Tab Take 1 tablet (5 mg total) by mouth 2 (two) times daily. Patient w/ thrombocytopenia <50, so held during admission,  follow-up with hematologist about restarting 180 tablet 1    brinzolamide (AZOPT) 1 % ophthalmic suspension Place1 drop in left eye 3 times a day for 30 days 15 mL 6    cetirizine (ZYRTEC) 10 MG tablet Take 1 tablet every day by oral route. 30 tablet 0    FLUoxetine 40 MG capsule Take 1 capsule every day by oral route. 30 capsule 2    fluticasone propionate (FLONASE ALLERGY RELIEF) 50 mcg/actuation nasal spray [place] 1 spray every day by intranasal route. 16 g 2    gabapentin (NEURONTIN) 300 MG capsule Take 2 capsules twice a day by oral route for 90 days. 360 capsule 1    hydrALAZINE (APRESOLINE) 50 MG tablet Take 1 tablet (50 mg total) by mouth every 8 (eight) hours. 90 tablet 0    insulin aspart U-100 (NOVOLOG) 100 unit/mL (3 mL) InPn pen Inject 8 Units into the skin 3 (three) times daily with meals. 15 mL 0    insulin glargine U-100, Lantus, (LANTUS SOLOSTAR U-100 INSULIN) 100 unit/mL (3 mL) InPn pen Inject 22 units every day by subcutaneous route in the evening for 30 days, for diabetes. 15 mL 2    levETIRAcetam (KEPPRA) 500 MG Tab Take 1 tablet (500 mg total) by mouth 2 (two) times daily. 60 tablet 11    ondansetron (ZOFRAN-ODT) 4 MG TbDL Place 1 tablet (4 mg) on or under the tongue every 8 hours for 10 days. 24 tablet 2    pantoprazole (PROTONIX) 40 MG tablet Take 1 tablet (40 mg total) by mouth once daily. 30 tablet 0    prednisoLONE acetate (PRED FORTE) 1 % DrpS Place 1 drop into the left eye 2 (two) times daily. 5 mL 3    promethazine (PHENERGAN) 25 MG tablet Take 1 tablet (25 mg total) by mouth every 6 (six) hours as needed. 15 tablet 0    sevelamer carbonate (RENVELA) 800 mg Tab Take 1 tablet (800 mg total) by mouth 3 (three) times daily with meals. 180 tablet 11    sucralfate (CARAFATE) 1 gram tablet Take 1 tablet (1 g total) by mouth 4 (four) times daily. 100 tablet 1    atropine 1% (ISOPTO ATROPINE) 1 % Drop Place 1 drop into the left eye 2 (two) times a day. 15 mL 3    blood sugar diagnostic (TRUE  "METRIX GLUCOSE TEST STRIP) Strp Use 1 strip to check blood glucose three times daily 100 each 11    blood-glucose meter (TRUE METRIX GLUCOSE METER) Misc Use to check blood glucose 1 each 0    blood-glucose meter,continuous Misc USE WITH SENSORS 1 each 0    blood-glucose sensor (DEXCOM G7 SENSOR) Heather Use as directed for CGM. Change sensor every 10 days 3 each 0    blood-glucose sensor Heather CHANGE EVERY 10 DAYS 3 each 0    brimonidine 0.15 % OPTH DROP (ALPHAGAN) 0.15 % ophthalmic solution Place 1 drop into both eyes 3 (three) times daily. 15 mL 3    busPIRone (BUSPAR) 10 MG tablet Take 1 tablet (10 mg total) by mouth 3 (three) times daily as needed (anxiety). 60 tablet 5    dorzolamide-timolol 2-0.5% (COSOPT) 22.3-6.8 mg/mL ophthalmic solution place 1 drop in left eye twice a day  for 30 days 10 mL 0    lancets (TRUEPLUS LANCETS) 33 gauge Misc Use 1 to check blood glucose three times daily 100 each 11    oxyCODONE-acetaminophen (PERCOCET)  mg per tablet Take 1 tablet by mouth every 4 (four) hours as needed for Pain. 42 tablet 0    pen needle, diabetic 31 gauge x 3/16" Ndle Use as directed with insulin once daily 100 each 0    timolol maleate 0.5% (TIMOPTIC) 0.5 % Drop Place 1 drop in left eye 2 times a day for 30 days 5 mL 6    [DISCONTINUED] atenoloL (TENORMIN) 50 MG tablet Take 1 tablet (50 mg total) by mouth every other day. 45 tablet 3    [DISCONTINUED] insulin detemir U-100, Levemir, (LEVEMIR FLEXTOUCH U100 INSULIN) 100 unit/mL (3 mL) InPn pen Inject 22 units every day by subcutaneous route in the evening for 90 days. 18 mL 1    [DISCONTINUED] omeprazole (PRILOSEC) 20 MG capsule Take 2 capsules (40 mg total) by mouth once daily. for 10 days 20 capsule 0     Scheduled Meds:   apixaban  5 mg Oral BID    FLUoxetine  40 mg Oral Daily    insulin glargine U-100 (Lantus)  20 Units Subcutaneous QHS    levETIRAcetam  500 mg Oral BID    pantoprazole  40 mg Oral Daily    pantoprazole  40 mg Oral Daily    prednisoLONE " "acetate  1 drop Left Eye BID    sevelamer carbonate  800 mg Oral TID WM    sucralfate  1 g Oral QID     Continuous Infusions:  PRN Meds:.  Current Facility-Administered Medications:     acetaminophen, 650 mg, Oral, Q4H PRN    albuterol-ipratropium, 3 mL, Nebulization, Q6H PRN    aluminum-magnesium hydroxide-simethicone, 30 mL, Oral, QID PRN    dextrose 10%, 12.5 g, Intravenous, PRN    dextrose 10%, 25 g, Intravenous, PRN    glucagon (human recombinant), 1 mg, Intramuscular, PRN    glucose, 16 g, Oral, PRN    glucose, 24 g, Oral, PRN    insulin aspart U-100, 0-5 Units, Subcutaneous, QID (AC + HS) PRN    lorazepam, 1 mg, Intravenous, Q6H PRN    naloxone, 0.02 mg, Intravenous, PRN    promethazine (PHENERGAN) 12.5 mg in 0.9% NaCl 50 mL IVPB, 12.5 mg, Intravenous, Q6H PRN    simethicone, 1 tablet, Oral, QID PRN    sodium chloride 0.9%, 10 mL, Intravenous, Q12H PRN    Allergies:  Droperidol, Haldol [haloperidol lactate], Penicillins, and Droperidol (bulk)    Past Family History:  Reviewed; refer to Hospitalist Admission Note    Review of Systems:  Review of Systems - All 14 systems reviewed and negative, except as noted in HPI    Physical Exam:    BP (!) 169/90   Pulse 101   Temp 98.4 °F (36.9 °C)   Resp 18   Ht 5' 2" (1.575 m)   Wt 47.5 kg (104 lb 11.5 oz)   LMP  (LMP Unknown) Comment: pt states last mentral cycle was 3yrs ago  SpO2 99%   Breastfeeding No   BMI 19.15 kg/m²     General Appearance:    Alert, cooperative, no distress, appears stated age   Head:    Normocephalic, without obvious abnormality, atraumatic   Eyes:    PER, conjunctiva/corneas clear, EOM's intact in both eyes        Throat:   Lips, mucosa, and tongue normal; teeth and gums normal   Back:     Symmetric, no curvature, ROM normal, no CVA tenderness   Lungs:     Clear to auscultation bilaterally, respirations unlabored   Chest wall:    No tenderness or deformity   Heart:    Regular rate and rhythm, S1 and S2 normal, no murmur, rub   or gallop "   Abdomen:     Soft, non-tender, bowel sounds active all four quadrants,     no masses, no organomegaly   Extremities:   Extremities normal, atraumatic, no cyanosis or edema   Pulses:   2+ and symmetric all extremities   MSK:   No joint or muscle swelling, tenderness or deformity   Skin:   Skin color, texture, turgor normal, no rashes or lesions   Neurologic:   CNII-XII intact, normal strength and sensation       Throughout.  No flap     Results:  Lab Results   Component Value Date     09/20/2024    K 3.8 09/20/2024    CL 99 09/20/2024    CO2 22 (L) 09/20/2024    BUN 10 09/20/2024    CREATININE 3.9 (H) 09/20/2024    CALCIUM 9.7 09/20/2024    ANIONGAP 19 (H) 09/20/2024    ESTGFRAFRICA 19 (L) 09/03/2022    EGFRNONAA 18 (A) 07/31/2022       Lab Results   Component Value Date    CALCIUM 9.7 09/20/2024    PHOS 3.8 09/20/2024       Recent Labs   Lab 09/20/24  0415   WBC 7.09   RBC 3.27*   HGB 9.5*   HCT 28.8*   *   MCV 88   MCH 29.1   MCHC 33.0          I have personally reviewed pertinent radiological imaging and reports.    Hugh Figueroa MD  [facility] [place]  [phone]

## 2025-06-11 ENCOUNTER — HOSPITAL ENCOUNTER (OUTPATIENT)
Age: 80
Discharge: HOME | End: 2025-06-14
Payer: MEDICARE

## 2025-06-11 DIAGNOSIS — L29.9 PRURITUS: ICD-10-CM

## 2025-06-11 PROCEDURE — 71046 X-RAY EXAM CHEST 2 VIEWS: CPT

## 2025-06-13 ENCOUNTER — HOSPITAL ENCOUNTER (OUTPATIENT)
Age: 80
Discharge: HOME | End: 2025-06-13
Payer: MEDICARE

## 2025-06-13 ENCOUNTER — TRANSCRIBE ORDERS (OUTPATIENT)
Age: 80
End: 2025-06-13

## 2025-06-13 DIAGNOSIS — L29.9 PRURITUS: Primary | ICD-10-CM

## 2025-06-13 LAB
ALBUMIN SERPL-MCNC: 3.3 G/DL (ref 3.4–5)
ALBUMIN/GLOB SERPL: 1.1 (ref 0.8–1.7)
ALP SERPL-CCNC: 59 U/L (ref 45–117)
ALT SERPL-CCNC: 23 U/L (ref 10–50)
ANION GAP SERPL CALC-SCNC: 11 MMOL/L (ref 3–18)
AST SERPL-CCNC: 22 U/L (ref 10–38)
BILIRUB SERPL-MCNC: 0.4 MG/DL (ref 0.2–1)
BUN SERPL-MCNC: 24 MG/DL (ref 6–23)
BUN/CREAT SERPL: 20 (ref 12–20)
CALCIUM SERPL-MCNC: 9.5 MG/DL (ref 8.5–10.1)
CHLORIDE SERPL-SCNC: 104 MMOL/L (ref 98–107)
CO2 SERPL-SCNC: 24 MMOL/L (ref 21–32)
CREAT SERPL-MCNC: 1.2 MG/DL (ref 0.6–1.3)
GLOBULIN SER CALC-MCNC: 2.9 G/DL (ref 2–4)
GLUCOSE SERPL-MCNC: 256 MG/DL (ref 74–108)
POTASSIUM SERPL-SCNC: 5.3 MMOL/L (ref 3.5–5.5)
PROT SERPL-MCNC: 6.2 G/DL (ref 6.4–8.2)
SODIUM SERPL-SCNC: 139 MMOL/L (ref 136–145)

## 2025-06-13 PROCEDURE — 80053 COMPREHEN METABOLIC PANEL: CPT

## 2025-06-13 PROCEDURE — 36415 COLL VENOUS BLD VENIPUNCTURE: CPT

## 2025-08-12 ENCOUNTER — OFFICE VISIT (OUTPATIENT)
Age: 80
End: 2025-08-12
Payer: MEDICARE

## 2025-08-12 VITALS
DIASTOLIC BLOOD PRESSURE: 78 MMHG | HEIGHT: 69 IN | SYSTOLIC BLOOD PRESSURE: 124 MMHG | BODY MASS INDEX: 29.33 KG/M2 | WEIGHT: 198 LBS | OXYGEN SATURATION: 95 % | HEART RATE: 95 BPM

## 2025-08-12 DIAGNOSIS — I35.0 SEVERE AORTIC VALVE STENOSIS: ICD-10-CM

## 2025-08-12 DIAGNOSIS — Z95.2 HISTORY OF TRANSCATHETER AORTIC VALVE REPLACEMENT (TAVR): Primary | ICD-10-CM

## 2025-08-12 DIAGNOSIS — I25.10 CORONARY ARTERY DISEASE, UNSPECIFIED VESSEL OR LESION TYPE, UNSPECIFIED WHETHER ANGINA PRESENT, UNSPECIFIED WHETHER NATIVE OR TRANSPLANTED HEART: ICD-10-CM

## 2025-08-12 DIAGNOSIS — E11.21 TYPE 2 DIABETES WITH NEPHROPATHY (HCC): ICD-10-CM

## 2025-08-12 DIAGNOSIS — R21 RASH: ICD-10-CM

## 2025-08-12 DIAGNOSIS — J44.9 CHRONIC OBSTRUCTIVE PULMONARY DISEASE, UNSPECIFIED COPD TYPE (HCC): ICD-10-CM

## 2025-08-12 DIAGNOSIS — I48.19 ATRIAL FIBRILLATION, PERSISTENT (HCC): ICD-10-CM

## 2025-08-12 PROCEDURE — 1159F MED LIST DOCD IN RCRD: CPT | Performed by: INTERNAL MEDICINE

## 2025-08-12 PROCEDURE — 4004F PT TOBACCO SCREEN RCVD TLK: CPT | Performed by: INTERNAL MEDICINE

## 2025-08-12 PROCEDURE — G8419 CALC BMI OUT NRM PARAM NOF/U: HCPCS | Performed by: INTERNAL MEDICINE

## 2025-08-12 PROCEDURE — 1126F AMNT PAIN NOTED NONE PRSNT: CPT | Performed by: INTERNAL MEDICINE

## 2025-08-12 PROCEDURE — 1160F RVW MEDS BY RX/DR IN RCRD: CPT | Performed by: INTERNAL MEDICINE

## 2025-08-12 PROCEDURE — G8427 DOCREV CUR MEDS BY ELIG CLIN: HCPCS | Performed by: INTERNAL MEDICINE

## 2025-08-12 PROCEDURE — 3023F SPIROM DOC REV: CPT | Performed by: INTERNAL MEDICINE

## 2025-08-12 PROCEDURE — 93000 ELECTROCARDIOGRAM COMPLETE: CPT | Performed by: INTERNAL MEDICINE

## 2025-08-12 PROCEDURE — 1123F ACP DISCUSS/DSCN MKR DOCD: CPT | Performed by: INTERNAL MEDICINE

## 2025-08-12 PROCEDURE — 3078F DIAST BP <80 MM HG: CPT | Performed by: INTERNAL MEDICINE

## 2025-08-12 PROCEDURE — 3074F SYST BP LT 130 MM HG: CPT | Performed by: INTERNAL MEDICINE

## 2025-08-12 PROCEDURE — 99214 OFFICE O/P EST MOD 30 MIN: CPT | Performed by: INTERNAL MEDICINE

## 2025-08-12 RX ORDER — FLUDROCORTISONE ACETATE 0.1 MG/1
0.1 TABLET ORAL DAILY
COMMUNITY
Start: 2025-06-05 | End: 2025-08-12 | Stop reason: ALTCHOICE

## 2025-08-12 ASSESSMENT — PATIENT HEALTH QUESTIONNAIRE - PHQ9
SUM OF ALL RESPONSES TO PHQ QUESTIONS 1-9: 0
2. FEELING DOWN, DEPRESSED OR HOPELESS: NOT AT ALL
SUM OF ALL RESPONSES TO PHQ QUESTIONS 1-9: 0
SUM OF ALL RESPONSES TO PHQ QUESTIONS 1-9: 0
1. LITTLE INTEREST OR PLEASURE IN DOING THINGS: NOT AT ALL
SUM OF ALL RESPONSES TO PHQ QUESTIONS 1-9: 0

## 2025-08-12 ASSESSMENT — ENCOUNTER SYMPTOMS
SORE THROAT: 0
ABDOMINAL DISTENTION: 0
ABDOMINAL PAIN: 0
NAUSEA: 0
VOMITING: 0
SHORTNESS OF BREATH: 0
COUGH: 0

## 2025-08-27 ENCOUNTER — APPOINTMENT (OUTPATIENT)
Facility: HOSPITAL | Age: 80
End: 2025-08-27
Payer: MEDICARE

## 2025-08-27 ENCOUNTER — HOSPITAL ENCOUNTER (INPATIENT)
Facility: HOSPITAL | Age: 80
LOS: 10 days | Discharge: NURSING, REHAB OR LONG-TERM CARE | End: 2025-09-06
Attending: EMERGENCY MEDICINE | Admitting: INTERNAL MEDICINE
Payer: MEDICARE

## 2025-08-27 DIAGNOSIS — R65.21 SEPSIS WITH ACUTE ORGAN DYSFUNCTION AND SEPTIC SHOCK, DUE TO UNSPECIFIED ORGANISM, UNSPECIFIED ORGAN DYSFUNCTION TYPE (HCC): ICD-10-CM

## 2025-08-27 DIAGNOSIS — I48.21 PERMANENT ATRIAL FIBRILLATION WITH RVR (HCC): ICD-10-CM

## 2025-08-27 DIAGNOSIS — R65.21 SEPTIC SHOCK (HCC): Primary | ICD-10-CM

## 2025-08-27 DIAGNOSIS — E11.21 TYPE 2 DIABETES WITH NEPHROPATHY (HCC): ICD-10-CM

## 2025-08-27 DIAGNOSIS — A41.9 SEPSIS WITH ACUTE ORGAN DYSFUNCTION AND SEPTIC SHOCK, DUE TO UNSPECIFIED ORGANISM, UNSPECIFIED ORGAN DYSFUNCTION TYPE (HCC): ICD-10-CM

## 2025-08-27 DIAGNOSIS — I35.0 SEVERE AORTIC VALVE STENOSIS: ICD-10-CM

## 2025-08-27 DIAGNOSIS — E83.42 HYPOMAGNESEMIA: ICD-10-CM

## 2025-08-27 DIAGNOSIS — A41.9 SEPTIC SHOCK (HCC): Primary | ICD-10-CM

## 2025-08-27 DIAGNOSIS — D68.9 COAGULOPATHY: ICD-10-CM

## 2025-08-27 LAB
ABO + RH BLD: NORMAL
ACB COMPLEX DNA BLD POS QL NAA+NON-PROBE: NOT DETECTED
ACCESSION NUMBER, LLC1M: ABNORMAL
ALBUMIN SERPL-MCNC: 2.5 G/DL (ref 3.4–5)
ALBUMIN/GLOB SERPL: 1 (ref 0.8–1.7)
ALP SERPL-CCNC: 70 U/L (ref 45–117)
ALT SERPL-CCNC: 30 U/L (ref 10–50)
ANION GAP SERPL CALC-SCNC: 14 MMOL/L (ref 3–18)
APPEARANCE UR: CLEAR
APTT PPP: 67.5 SEC (ref 21.7–34.2)
AST SERPL-CCNC: 24 U/L (ref 10–38)
B FRAGILIS DNA BLD POS QL NAA+NON-PROBE: NOT DETECTED
B PERT DNA SPEC QL NAA+PROBE: NOT DETECTED
BACTERIA URNS QL MICRO: ABNORMAL /HPF
BASOPHILS # BLD: 0.02 K/UL (ref 0–0.1)
BASOPHILS NFR BLD: 0.3 % (ref 0–2)
BILIRUB SERPL-MCNC: 0.5 MG/DL (ref 0.2–1)
BILIRUB UR QL: NEGATIVE
BIOFIRE TEST COMMENT: ABNORMAL
BLOOD GROUP ANTIBODIES SERPL: NORMAL
BORDETELLA PARAPERTUSSIS BY PCR: NOT DETECTED
BUN SERPL-MCNC: 29 MG/DL (ref 6–23)
BUN/CREAT SERPL: 20 (ref 12–20)
C ALBICANS DNA BLD POS QL NAA+NON-PROBE: NOT DETECTED
C AURIS DNA BLD POS QL NAA+NON-PROBE: NOT DETECTED
C GATTII+NEOFOR DNA BLD POS QL NAA+N-PRB: NOT DETECTED
C GLABRATA DNA BLD POS QL NAA+NON-PROBE: NOT DETECTED
C KRUSEI DNA BLD POS QL NAA+NON-PROBE: NOT DETECTED
C PARAP DNA BLD POS QL NAA+NON-PROBE: NOT DETECTED
C PNEUM DNA SPEC QL NAA+PROBE: NOT DETECTED
C TROPICLS DNA BLD POS QL NAA+NON-PROBE: NOT DETECTED
CALCIUM SERPL-MCNC: 8.9 MG/DL (ref 8.5–10.1)
CHLORIDE SERPL-SCNC: 105 MMOL/L (ref 98–107)
CO2 SERPL-SCNC: 22 MMOL/L (ref 21–32)
COLOR UR: YELLOW
CREAT SERPL-MCNC: 1.48 MG/DL (ref 0.6–1.3)
DIFFERENTIAL METHOD BLD: ABNORMAL
E CLOAC COMP DNA BLD POS NAA+NON-PROBE: NOT DETECTED
E COLI DNA BLD POS QL NAA+NON-PROBE: NOT DETECTED
E FAECALIS DNA BLD POS QL NAA+NON-PROBE: NOT DETECTED
E FAECIUM DNA BLD POS QL NAA+NON-PROBE: NOT DETECTED
EKG ATRIAL RATE: 113 BPM
EKG DIAGNOSIS: NORMAL
EKG DIAGNOSIS: NORMAL
EKG Q-T INTERVAL: 340 MS
EKG Q-T INTERVAL: 382 MS
EKG QRS DURATION: 110 MS
EKG QRS DURATION: 112 MS
EKG QTC CALCULATION (BAZETT): 490 MS
EKG QTC CALCULATION (BAZETT): 503 MS
EKG R AXIS: -48 DEGREES
EKG R AXIS: -55 DEGREES
EKG T AXIS: 105 DEGREES
EKG T AXIS: 78 DEGREES
EKG VENTRICULAR RATE: 132 BPM
EKG VENTRICULAR RATE: 99 BPM
ENTEROBACTERALES DNA BLD POS NAA+N-PRB: NOT DETECTED
EOSINOPHIL # BLD: 0.01 K/UL (ref 0–0.4)
EOSINOPHIL NFR BLD: 0.1 % (ref 0–5)
EPITH CASTS URNS QL MICRO: ABNORMAL /LPF (ref 0–5)
ERYTHROCYTE [DISTWIDTH] IN BLOOD BY AUTOMATED COUNT: 16.3 % (ref 11.6–14.5)
FLUAV SUBTYP SPEC NAA+PROBE: NOT DETECTED
FLUBV RNA SPEC QL NAA+PROBE: NOT DETECTED
GLOBULIN SER CALC-MCNC: 2.6 G/DL (ref 2–4)
GLUCOSE BLD STRIP.AUTO-MCNC: 239 MG/DL (ref 70–110)
GLUCOSE BLD STRIP.AUTO-MCNC: 284 MG/DL (ref 70–110)
GLUCOSE SERPL-MCNC: 242 MG/DL (ref 74–108)
GLUCOSE UR STRIP.AUTO-MCNC: 500 MG/DL
GP B STREP DNA BLD POS QL NAA+NON-PROBE: NOT DETECTED
HADV DNA SPEC QL NAA+PROBE: NOT DETECTED
HAEM INFLU DNA BLD POS QL NAA+NON-PROBE: NOT DETECTED
HCOV 229E RNA SPEC QL NAA+PROBE: NOT DETECTED
HCOV HKU1 RNA SPEC QL NAA+PROBE: NOT DETECTED
HCOV NL63 RNA SPEC QL NAA+PROBE: NOT DETECTED
HCOV OC43 RNA SPEC QL NAA+PROBE: NOT DETECTED
HCT VFR BLD AUTO: 37.6 % (ref 36–48)
HGB BLD-MCNC: 12.1 G/DL (ref 13–16)
HGB UR QL STRIP: NEGATIVE
HMPV RNA SPEC QL NAA+PROBE: NOT DETECTED
HPIV1 RNA SPEC QL NAA+PROBE: NOT DETECTED
HPIV2 RNA SPEC QL NAA+PROBE: NOT DETECTED
HPIV3 RNA SPEC QL NAA+PROBE: NOT DETECTED
HPIV4 RNA SPEC QL NAA+PROBE: NOT DETECTED
IMM GRANULOCYTES # BLD AUTO: 0.07 K/UL (ref 0–0.04)
IMM GRANULOCYTES NFR BLD AUTO: 1 % (ref 0–0.5)
INR PPP: 4.8 (ref 0.9–1.2)
INR PPP: >9.2 (ref 0.9–1.2)
K OXYTOCA DNA BLD POS QL NAA+NON-PROBE: NOT DETECTED
KETONES UR QL STRIP.AUTO: ABNORMAL MG/DL
KLEBSIELLA SP DNA BLD POS QL NAA+NON-PRB: NOT DETECTED
KLEBSIELLA SP DNA BLD POS QL NAA+NON-PRB: NOT DETECTED
L MONOCYTOG DNA BLD POS QL NAA+NON-PROBE: NOT DETECTED
LACTATE BLD-SCNC: 2.59 MMOL/L (ref 0.4–2)
LACTATE BLD-SCNC: 3.76 MMOL/L (ref 0.4–2)
LACTATE SERPL-SCNC: 2.4 MMOL/L (ref 0.4–2)
LACTATE SERPL-SCNC: 3.2 MMOL/L (ref 0.4–2)
LEUKOCYTE ESTERASE UR QL STRIP.AUTO: ABNORMAL
LYMPHOCYTES # BLD: 0.28 K/UL (ref 0.9–3.6)
LYMPHOCYTES NFR BLD: 3.8 % (ref 21–52)
M PNEUMO DNA SPEC QL NAA+PROBE: NOT DETECTED
MAGNESIUM SERPL-MCNC: 0.7 MG/DL (ref 1.6–2.6)
MAGNESIUM SERPL-MCNC: 2 MG/DL (ref 1.6–2.6)
MCH RBC QN AUTO: 28.1 PG (ref 24–34)
MCHC RBC AUTO-ENTMCNC: 32.2 G/DL (ref 31–37)
MCV RBC AUTO: 87.2 FL (ref 78–100)
MECA+MECC+MREJ ISLT/SPM QL: NOT DETECTED
MONOCYTES # BLD: 0.16 K/UL (ref 0.05–1.2)
MONOCYTES NFR BLD: 2.2 % (ref 3–10)
MUCOUS THREADS URNS QL MICRO: ABNORMAL /LPF
N MEN DNA BLD POS QL NAA+NON-PROBE: NOT DETECTED
NEUTS SEG # BLD: 6.75 K/UL (ref 1.8–8)
NEUTS SEG NFR BLD: 92.6 % (ref 40–73)
NITRITE UR QL STRIP.AUTO: NEGATIVE
NRBC # BLD: 0 K/UL (ref 0–0.01)
NRBC BLD-RTO: 0 PER 100 WBC
NT PRO BNP: 3619 PG/ML (ref 36–1800)
P AERUGINOSA DNA BLD POS NAA+NON-PROBE: NOT DETECTED
PH UR STRIP: 5 (ref 5–8)
PHOSPHATE SERPL-MCNC: 1.7 MG/DL (ref 2.5–4.9)
PLATELET # BLD AUTO: 142 K/UL (ref 135–420)
PMV BLD AUTO: 10.4 FL (ref 9.2–11.8)
POTASSIUM SERPL-SCNC: 4.5 MMOL/L (ref 3.5–5.5)
PROCALCITONIN SERPL-MCNC: 1.59 NG/ML
PROT SERPL-MCNC: 5.1 G/DL (ref 6.4–8.2)
PROT UR STRIP-MCNC: 100 MG/DL
PROTEUS SP DNA BLD POS QL NAA+NON-PROBE: NOT DETECTED
PROTHROMBIN TIME: 45.2 SEC (ref 12–15.1)
PROTHROMBIN TIME: >75 SEC (ref 12–15.1)
RBC # BLD AUTO: 4.31 M/UL (ref 4.35–5.65)
RBC #/AREA URNS HPF: ABNORMAL /HPF (ref 0–5)
RESISTANT GENE TARGETS: ABNORMAL
RSV RNA SPEC QL NAA+PROBE: NOT DETECTED
RV+EV RNA SPEC QL NAA+PROBE: NOT DETECTED
S AUREUS DNA BLD POS QL NAA+NON-PROBE: DETECTED
S AUREUS+CONS DNA BLD POS NAA+NON-PROBE: DETECTED
S EPIDERMIDIS DNA BLD POS QL NAA+NON-PRB: NOT DETECTED
S LUGDUNENSIS DNA BLD POS QL NAA+NON-PRB: NOT DETECTED
S MALTOPHILIA DNA BLD POS QL NAA+NON-PRB: NOT DETECTED
S MARCESCENS DNA BLD POS NAA+NON-PROBE: NOT DETECTED
S PNEUM DNA BLD POS QL NAA+NON-PROBE: NOT DETECTED
S PYO DNA BLD POS QL NAA+NON-PROBE: NOT DETECTED
SALMONELLA DNA BLD POS QL NAA+NON-PROBE: NOT DETECTED
SARS-COV-2 RNA RESP QL NAA+PROBE: NOT DETECTED
SODIUM SERPL-SCNC: 141 MMOL/L (ref 136–145)
SP GR UR REFRACTOMETRY: 1.03 (ref 1–1.03)
SPECIMEN EXP DATE BLD: NORMAL
STREPTOCOCCUS DNA BLD POS NAA+NON-PROBE: NOT DETECTED
TROPONIN T SERPL HS-MCNC: 38.1 NG/L (ref 0–22)
TSH, 3RD GENERATION: 6.8 UIU/ML (ref 0.27–4.2)
UROBILINOGEN UR QL STRIP.AUTO: 1 EU/DL (ref 0.2–1)
WBC # BLD AUTO: 7.3 K/UL (ref 4.6–13.2)
WBC URNS QL MICRO: ABNORMAL /HPF (ref 0–5)

## 2025-08-27 PROCEDURE — 84484 ASSAY OF TROPONIN QUANT: CPT

## 2025-08-27 PROCEDURE — 84145 PROCALCITONIN (PCT): CPT

## 2025-08-27 PROCEDURE — 6370000000 HC RX 637 (ALT 250 FOR IP): Performed by: EMERGENCY MEDICINE

## 2025-08-27 PROCEDURE — 83880 ASSAY OF NATRIURETIC PEPTIDE: CPT

## 2025-08-27 PROCEDURE — 86901 BLOOD TYPING SEROLOGIC RH(D): CPT

## 2025-08-27 PROCEDURE — 96365 THER/PROPH/DIAG IV INF INIT: CPT

## 2025-08-27 PROCEDURE — 2500000003 HC RX 250 WO HCPCS

## 2025-08-27 PROCEDURE — 80053 COMPREHEN METABOLIC PANEL: CPT

## 2025-08-27 PROCEDURE — 2580000003 HC RX 258

## 2025-08-27 PROCEDURE — 6360000002 HC RX W HCPCS

## 2025-08-27 PROCEDURE — 94761 N-INVAS EAR/PLS OXIMETRY MLT: CPT

## 2025-08-27 PROCEDURE — 84443 ASSAY THYROID STIM HORMONE: CPT

## 2025-08-27 PROCEDURE — 71275 CT ANGIOGRAPHY CHEST: CPT

## 2025-08-27 PROCEDURE — 2580000003 HC RX 258: Performed by: PHYSICIAN ASSISTANT

## 2025-08-27 PROCEDURE — 93010 ELECTROCARDIOGRAM REPORT: CPT | Performed by: INTERNAL MEDICINE

## 2025-08-27 PROCEDURE — 51702 INSERT TEMP BLADDER CATH: CPT

## 2025-08-27 PROCEDURE — 6370000000 HC RX 637 (ALT 250 FOR IP)

## 2025-08-27 PROCEDURE — 99285 EMERGENCY DEPT VISIT HI MDM: CPT

## 2025-08-27 PROCEDURE — 85730 THROMBOPLASTIN TIME PARTIAL: CPT

## 2025-08-27 PROCEDURE — 82962 GLUCOSE BLOOD TEST: CPT

## 2025-08-27 PROCEDURE — 6370000000 HC RX 637 (ALT 250 FOR IP): Performed by: PHYSICIAN ASSISTANT

## 2025-08-27 PROCEDURE — 2500000003 HC RX 250 WO HCPCS: Performed by: PHYSICIAN ASSISTANT

## 2025-08-27 PROCEDURE — 6360000002 HC RX W HCPCS: Performed by: EMERGENCY MEDICINE

## 2025-08-27 PROCEDURE — 2500000003 HC RX 250 WO HCPCS: Performed by: EMERGENCY MEDICINE

## 2025-08-27 PROCEDURE — P9059 PLASMA, FRZ BETWEEN 8-24HOUR: HCPCS

## 2025-08-27 PROCEDURE — 96368 THER/DIAG CONCURRENT INF: CPT

## 2025-08-27 PROCEDURE — 84100 ASSAY OF PHOSPHORUS: CPT

## 2025-08-27 PROCEDURE — 83605 ASSAY OF LACTIC ACID: CPT

## 2025-08-27 PROCEDURE — 93005 ELECTROCARDIOGRAM TRACING: CPT | Performed by: EMERGENCY MEDICINE

## 2025-08-27 PROCEDURE — 83735 ASSAY OF MAGNESIUM: CPT

## 2025-08-27 PROCEDURE — 96367 TX/PROPH/DG ADDL SEQ IV INF: CPT

## 2025-08-27 PROCEDURE — 2580000003 HC RX 258: Performed by: EMERGENCY MEDICINE

## 2025-08-27 PROCEDURE — 96366 THER/PROPH/DIAG IV INF ADDON: CPT

## 2025-08-27 PROCEDURE — 71045 X-RAY EXAM CHEST 1 VIEW: CPT

## 2025-08-27 PROCEDURE — 2000000000 HC ICU R&B

## 2025-08-27 PROCEDURE — 87186 SC STD MICRODIL/AGAR DIL: CPT

## 2025-08-27 PROCEDURE — 86850 RBC ANTIBODY SCREEN: CPT

## 2025-08-27 PROCEDURE — 36556 INSERT NON-TUNNEL CV CATH: CPT

## 2025-08-27 PROCEDURE — 85610 PROTHROMBIN TIME: CPT

## 2025-08-27 PROCEDURE — 0202U NFCT DS 22 TRGT SARS-COV-2: CPT

## 2025-08-27 PROCEDURE — 74177 CT ABD & PELVIS W/CONTRAST: CPT

## 2025-08-27 PROCEDURE — 6360000004 HC RX CONTRAST MEDICATION: Performed by: EMERGENCY MEDICINE

## 2025-08-27 PROCEDURE — 86900 BLOOD TYPING SEROLOGIC ABO: CPT

## 2025-08-27 PROCEDURE — 81001 URINALYSIS AUTO W/SCOPE: CPT

## 2025-08-27 PROCEDURE — 87154 CUL TYP ID BLD PTHGN 6+ TRGT: CPT

## 2025-08-27 PROCEDURE — 99222 1ST HOSP IP/OBS MODERATE 55: CPT | Performed by: INTERNAL MEDICINE

## 2025-08-27 PROCEDURE — 87040 BLOOD CULTURE FOR BACTERIA: CPT

## 2025-08-27 PROCEDURE — 6360000002 HC RX W HCPCS: Performed by: PHYSICIAN ASSISTANT

## 2025-08-27 PROCEDURE — 85025 COMPLETE CBC W/AUTO DIFF WBC: CPT

## 2025-08-27 PROCEDURE — 87077 CULTURE AEROBIC IDENTIFY: CPT

## 2025-08-27 PROCEDURE — 96375 TX/PRO/DX INJ NEW DRUG ADDON: CPT

## 2025-08-27 PROCEDURE — 87086 URINE CULTURE/COLONY COUNT: CPT

## 2025-08-27 RX ORDER — DEXTROSE MONOHYDRATE 100 MG/ML
INJECTION, SOLUTION INTRAVENOUS CONTINUOUS PRN
Status: DISCONTINUED | OUTPATIENT
Start: 2025-08-27 | End: 2025-08-30 | Stop reason: SDUPTHER

## 2025-08-27 RX ORDER — IBUPROFEN 600 MG/1
600 TABLET, FILM COATED ORAL
Status: COMPLETED | OUTPATIENT
Start: 2025-08-27 | End: 2025-08-27

## 2025-08-27 RX ORDER — HYDROXYZINE HYDROCHLORIDE 50 MG/1
50 TABLET, FILM COATED ORAL 2 TIMES DAILY
COMMUNITY

## 2025-08-27 RX ORDER — MAGNESIUM SULFATE IN WATER 40 MG/ML
2000 INJECTION, SOLUTION INTRAVENOUS
Status: COMPLETED | OUTPATIENT
Start: 2025-08-27 | End: 2025-08-27

## 2025-08-27 RX ORDER — MAGNESIUM SULFATE IN WATER 40 MG/ML
2000 INJECTION, SOLUTION INTRAVENOUS ONCE
Status: COMPLETED | OUTPATIENT
Start: 2025-08-27 | End: 2025-08-27

## 2025-08-27 RX ORDER — METOPROLOL TARTRATE 50 MG
100 TABLET ORAL 2 TIMES DAILY
Status: DISCONTINUED | OUTPATIENT
Start: 2025-08-27 | End: 2025-08-29

## 2025-08-27 RX ORDER — ACETAMINOPHEN 500 MG
1000 TABLET ORAL
Status: COMPLETED | OUTPATIENT
Start: 2025-08-27 | End: 2025-08-27

## 2025-08-27 RX ORDER — VANCOMYCIN 1.75 G/350ML
1250 INJECTION, SOLUTION INTRAVENOUS EVERY 24 HOURS
Status: DISCONTINUED | OUTPATIENT
Start: 2025-08-28 | End: 2025-08-29

## 2025-08-27 RX ORDER — DILTIAZEM HYDROCHLORIDE 5 MG/ML
10 INJECTION INTRAVENOUS
Status: COMPLETED | OUTPATIENT
Start: 2025-08-27 | End: 2025-08-27

## 2025-08-27 RX ORDER — LEVOTHYROXINE SODIUM 125 UG/1
125 TABLET ORAL
Status: DISCONTINUED | OUTPATIENT
Start: 2025-08-28 | End: 2025-09-06 | Stop reason: HOSPADM

## 2025-08-27 RX ORDER — HEPARIN SODIUM 5000 [USP'U]/ML
5000 INJECTION, SOLUTION INTRAVENOUS; SUBCUTANEOUS EVERY 8 HOURS SCHEDULED
Status: DISCONTINUED | OUTPATIENT
Start: 2025-08-27 | End: 2025-08-30

## 2025-08-27 RX ORDER — SODIUM CHLORIDE 9 MG/ML
INJECTION, SOLUTION INTRAVENOUS PRN
Status: DISCONTINUED | OUTPATIENT
Start: 2025-08-27 | End: 2025-08-29

## 2025-08-27 RX ORDER — VANCOMYCIN 2 G/400ML
2000 INJECTION, SOLUTION INTRAVENOUS ONCE
Status: COMPLETED | OUTPATIENT
Start: 2025-08-27 | End: 2025-08-27

## 2025-08-27 RX ORDER — IOPAMIDOL 755 MG/ML
100 INJECTION, SOLUTION INTRAVASCULAR
Status: COMPLETED | OUTPATIENT
Start: 2025-08-27 | End: 2025-08-27

## 2025-08-27 RX ORDER — FUROSEMIDE 20 MG/1
20 TABLET ORAL DAILY
Status: DISCONTINUED | OUTPATIENT
Start: 2025-08-28 | End: 2025-09-06 | Stop reason: HOSPADM

## 2025-08-27 RX ORDER — IOPAMIDOL 612 MG/ML
100 INJECTION, SOLUTION INTRAVASCULAR
Status: DISCONTINUED | OUTPATIENT
Start: 2025-08-27 | End: 2025-08-27

## 2025-08-27 RX ORDER — INSULIN LISPRO 100 [IU]/ML
0-4 INJECTION, SOLUTION INTRAVENOUS; SUBCUTANEOUS EVERY 6 HOURS
Status: DISCONTINUED | OUTPATIENT
Start: 2025-08-27 | End: 2025-08-29

## 2025-08-27 RX ORDER — 0.9 % SODIUM CHLORIDE 0.9 %
30 INTRAVENOUS SOLUTION INTRAVENOUS ONCE
Status: COMPLETED | OUTPATIENT
Start: 2025-08-27 | End: 2025-08-27

## 2025-08-27 RX ADMIN — MAGNESIUM SULFATE HEPTAHYDRATE 2000 MG: 40 INJECTION, SOLUTION INTRAVENOUS at 20:12

## 2025-08-27 RX ADMIN — POTASSIUM PHOSPHATE, MONOBASIC POTASSIUM PHOSPHATE, DIBASIC 15 MMOL: 224; 236 INJECTION, SOLUTION, CONCENTRATE INTRAVENOUS at 18:20

## 2025-08-27 RX ADMIN — NOREPINEPHRINE BITARTRATE 15 MCG/MIN: 32 SOLUTION INTRAVENOUS at 07:57

## 2025-08-27 RX ADMIN — INSULIN LISPRO 1 UNITS: 100 INJECTION, SOLUTION INTRAVENOUS; SUBCUTANEOUS at 20:24

## 2025-08-27 RX ADMIN — SODIUM CHLORIDE 2694 ML: 0.9 INJECTION, SOLUTION INTRAVENOUS at 06:43

## 2025-08-27 RX ADMIN — NOREPINEPHRINE BITARTRATE 5 MCG/MIN: 32 SOLUTION INTRAVENOUS at 07:27

## 2025-08-27 RX ADMIN — NOREPINEPHRINE BITARTRATE 9 MCG/MIN: 32 SOLUTION INTRAVENOUS at 07:38

## 2025-08-27 RX ADMIN — SODIUM CHLORIDE, PRESERVATIVE FREE 40 MG: 5 INJECTION INTRAVENOUS at 17:14

## 2025-08-27 RX ADMIN — NOREPINEPHRINE BITARTRATE 7 MCG/MIN: 32 SOLUTION INTRAVENOUS at 07:33

## 2025-08-27 RX ADMIN — IBUPROFEN 600 MG: 600 TABLET ORAL at 06:48

## 2025-08-27 RX ADMIN — MAGNESIUM SULFATE HEPTAHYDRATE 2000 MG: 40 INJECTION, SOLUTION INTRAVENOUS at 08:02

## 2025-08-27 RX ADMIN — IOPAMIDOL 80 ML: 755 INJECTION, SOLUTION INTRAVENOUS at 10:38

## 2025-08-27 RX ADMIN — PIPERACILLIN AND TAZOBACTAM 3375 MG: 3; .375 INJECTION, POWDER, LYOPHILIZED, FOR SOLUTION INTRAVENOUS at 17:21

## 2025-08-27 RX ADMIN — PIPERACILLIN AND TAZOBACTAM 4500 MG: 4; .5 INJECTION, POWDER, FOR SOLUTION INTRAVENOUS at 06:54

## 2025-08-27 RX ADMIN — METOPROLOL TARTRATE 100 MG: 50 TABLET, FILM COATED ORAL at 23:23

## 2025-08-27 RX ADMIN — ACETAMINOPHEN 1000 MG: 500 TABLET ORAL at 06:48

## 2025-08-27 RX ADMIN — DILTIAZEM HYDROCHLORIDE 10 MG: 5 INJECTION, SOLUTION INTRAVENOUS at 08:47

## 2025-08-27 RX ADMIN — VANCOMYCIN 2000 MG: 2 INJECTION, SOLUTION INTRAVENOUS at 08:03

## 2025-08-27 RX ADMIN — INSULIN LISPRO 2 UNITS: 100 INJECTION, SOLUTION INTRAVENOUS; SUBCUTANEOUS at 15:18

## 2025-08-27 RX ADMIN — NOREPINEPHRINE BITARTRATE 14 MCG/MIN: 32 SOLUTION INTRAVENOUS at 15:10

## 2025-08-27 ASSESSMENT — PAIN SCALES - GENERAL
PAINLEVEL_OUTOF10: 0
PAINLEVEL_OUTOF10: 0

## 2025-08-28 ENCOUNTER — APPOINTMENT (OUTPATIENT)
Facility: HOSPITAL | Age: 80
End: 2025-08-28
Attending: INTERNAL MEDICINE
Payer: MEDICARE

## 2025-08-28 PROBLEM — A41.9 SEPSIS WITH ACUTE ORGAN DYSFUNCTION AND SEPTIC SHOCK (HCC): Status: ACTIVE | Noted: 2025-08-28

## 2025-08-28 PROBLEM — R57.9 SHOCK (HCC): Status: ACTIVE | Noted: 2025-08-28

## 2025-08-28 PROBLEM — R65.21 SEPSIS WITH ACUTE ORGAN DYSFUNCTION AND SEPTIC SHOCK (HCC): Status: ACTIVE | Noted: 2025-08-28

## 2025-08-28 PROBLEM — D68.9 COAGULOPATHY: Status: ACTIVE | Noted: 2025-08-28

## 2025-08-28 LAB
ANION GAP SERPL CALC-SCNC: 8 MMOL/L (ref 3–18)
BACTERIA SPEC CULT: NORMAL
BASOPHILS # BLD: 0.03 K/UL (ref 0–0.1)
BASOPHILS NFR BLD: 0.4 % (ref 0–2)
BLD PROD TYP BPU: NORMAL
BLOOD BANK BLOOD PRODUCT EXPIRATION DATE: NORMAL
BLOOD BANK DISPENSE STATUS: NORMAL
BLOOD BANK ISBT PRODUCT BLOOD TYPE: 2800
BLOOD BANK PRODUCT CODE: NORMAL
BLOOD BANK UNIT TYPE AND RH: NORMAL
BPU ID: NORMAL
BUN SERPL-MCNC: 28 MG/DL (ref 6–23)
BUN/CREAT SERPL: 23 (ref 12–20)
CALCIUM SERPL-MCNC: 8.1 MG/DL (ref 8.5–10.1)
CALLED TO: NORMAL
CHLORIDE SERPL-SCNC: 109 MMOL/L (ref 98–107)
CO2 SERPL-SCNC: 22 MMOL/L (ref 21–32)
CREAT SERPL-MCNC: 1.19 MG/DL (ref 0.6–1.3)
DIFFERENTIAL METHOD BLD: ABNORMAL
ECHO AO ASC DIAM: 3.5 CM
ECHO AO ASCENDING AORTA INDEX: 1.7 CM/M2
ECHO AO ROOT DIAM: 3.3 CM
ECHO AO ROOT INDEX: 1.6 CM/M2
ECHO AV MEAN GRADIENT: 4 MMHG
ECHO AV MEAN VELOCITY: 1 M/S
ECHO AV PEAK GRADIENT: 10 MMHG
ECHO AV PEAK VELOCITY: 1.5 M/S
ECHO AV VELOCITY RATIO: 0.93
ECHO AV VTI: 23.5 CM
ECHO BSA: 2.09 M2
ECHO EST RA PRESSURE: 8 MMHG
ECHO IVC INSP: 1 CM
ECHO IVC PROX: 2.3 CM
ECHO LA DIAMETER INDEX: 2.82 CM/M2
ECHO LA DIAMETER: 5.8 CM
ECHO LA TO AORTIC ROOT RATIO: 1.76
ECHO LA VOL A-L A2C: 202 ML (ref 18–58)
ECHO LA VOL A-L A4C: 174 ML (ref 18–58)
ECHO LA VOL BP: 183 ML (ref 18–58)
ECHO LA VOL MOD A2C: 192 ML (ref 18–58)
ECHO LA VOL MOD A4C: 155 ML (ref 18–58)
ECHO LA VOL/BSA BIPLANE: 89 ML/M2 (ref 16–34)
ECHO LA VOLUME AREA LENGTH: 201 ML
ECHO LA VOLUME INDEX A-L A2C: 98 ML/M2 (ref 16–34)
ECHO LA VOLUME INDEX A-L A4C: 84 ML/M2 (ref 16–34)
ECHO LA VOLUME INDEX AREA LENGTH: 98 ML/M2 (ref 16–34)
ECHO LA VOLUME INDEX MOD A2C: 93 ML/M2 (ref 16–34)
ECHO LA VOLUME INDEX MOD A4C: 75 ML/M2 (ref 16–34)
ECHO LV EDV A2C: 78 ML
ECHO LV EDV A4C: 67 ML
ECHO LV EDV BP: 78 ML (ref 67–155)
ECHO LV EDV INDEX A4C: 33 ML/M2
ECHO LV EDV INDEX BP: 38 ML/M2
ECHO LV EDV NDEX A2C: 38 ML/M2
ECHO LV EF PHYSICIAN: 45 %
ECHO LV EJECTION FRACTION A2C: 51 %
ECHO LV EJECTION FRACTION A4C: 43 %
ECHO LV EJECTION FRACTION BIPLANE: 51 % (ref 55–100)
ECHO LV ESV A2C: 38 ML
ECHO LV ESV A4C: 39 ML
ECHO LV ESV BP: 38 ML (ref 22–58)
ECHO LV ESV INDEX A2C: 18 ML/M2
ECHO LV ESV INDEX A4C: 19 ML/M2
ECHO LV ESV INDEX BP: 18 ML/M2
ECHO LV FRACTIONAL SHORTENING: 21 % (ref 28–44)
ECHO LV INTERNAL DIMENSION DIASTOLE INDEX: 2.52 CM/M2
ECHO LV INTERNAL DIMENSION DIASTOLIC: 5.2 CM (ref 4.2–5.9)
ECHO LV INTERNAL DIMENSION SYSTOLIC INDEX: 1.99 CM/M2
ECHO LV INTERNAL DIMENSION SYSTOLIC: 4.1 CM
ECHO LV IVSD: 1.2 CM (ref 0.6–1)
ECHO LV MASS 2D: 263.4 G (ref 88–224)
ECHO LV MASS INDEX 2D: 127.9 G/M2 (ref 49–115)
ECHO LV POSTERIOR WALL DIASTOLIC: 1.3 CM (ref 0.6–1)
ECHO LV RELATIVE WALL THICKNESS RATIO: 0.5
ECHO LVOT AV VTI INDEX: 1.1
ECHO LVOT MEAN GRADIENT: 4 MMHG
ECHO LVOT PEAK GRADIENT: 8 MMHG
ECHO LVOT PEAK VELOCITY: 1.4 M/S
ECHO LVOT VTI: 25.8 CM
ECHO MV MEAN GRADIENT: 2 MMHG
ECHO PV MAX VELOCITY: 0.7 M/S
ECHO PV PEAK GRADIENT: 2 MMHG
ECHO RA VOLUME BIPLANE METHOD OF DISKS: 127 ML
ECHO RA VOLUME INDEX BP: 62 ML/M2
ECHO RIGHT VENTRICULAR SYSTOLIC PRESSURE (RVSP): 38 MMHG
ECHO RV BASAL DIMENSION: 4.4 CM
ECHO RV FREE WALL PEAK S': 9.4 CM/S
ECHO RV TAPSE: 1.6 CM (ref 1.7–?)
ECHO TV REGURGITANT MAX VELOCITY: 2.72 M/S
ECHO TV REGURGITANT PEAK GRADIENT: 30 MMHG
EOSINOPHIL # BLD: 0.02 K/UL (ref 0–0.4)
EOSINOPHIL NFR BLD: 0.3 % (ref 0–5)
ERYTHROCYTE [DISTWIDTH] IN BLOOD BY AUTOMATED COUNT: 16.9 % (ref 11.6–14.5)
EST. AVERAGE GLUCOSE BLD GHB EST-MCNC: 263 MG/DL
GLUCOSE BLD STRIP.AUTO-MCNC: 165 MG/DL (ref 70–110)
GLUCOSE BLD STRIP.AUTO-MCNC: 174 MG/DL (ref 70–110)
GLUCOSE BLD STRIP.AUTO-MCNC: 242 MG/DL (ref 70–110)
GLUCOSE BLD STRIP.AUTO-MCNC: 257 MG/DL (ref 70–110)
GLUCOSE SERPL-MCNC: 157 MG/DL (ref 74–108)
HBA1C MFR BLD: 10.8 % (ref 4.2–5.6)
HCT VFR BLD AUTO: 30.1 % (ref 36–48)
HGB BLD-MCNC: 9.8 G/DL (ref 13–16)
IMM GRANULOCYTES # BLD AUTO: 0.05 K/UL (ref 0–0.04)
IMM GRANULOCYTES NFR BLD AUTO: 0.7 % (ref 0–0.5)
INR PPP: 7 (ref 0.9–1.2)
LACTATE SERPL-SCNC: 1.6 MMOL/L (ref 0.4–2)
LYMPHOCYTES # BLD: 0.37 K/UL (ref 0.9–3.6)
LYMPHOCYTES NFR BLD: 5.5 % (ref 21–52)
MAGNESIUM SERPL-MCNC: 1.8 MG/DL (ref 1.6–2.6)
MCH RBC QN AUTO: 28.5 PG (ref 24–34)
MCHC RBC AUTO-ENTMCNC: 32.6 G/DL (ref 31–37)
MCV RBC AUTO: 87.5 FL (ref 78–100)
MONOCYTES # BLD: 0.26 K/UL (ref 0.05–1.2)
MONOCYTES NFR BLD: 3.9 % (ref 3–10)
NEUTS SEG # BLD: 5.94 K/UL (ref 1.8–8)
NEUTS SEG NFR BLD: 89.2 % (ref 40–73)
NRBC # BLD: 0 K/UL (ref 0–0.01)
NRBC BLD-RTO: 0 PER 100 WBC
PHOSPHATE SERPL-MCNC: 2.8 MG/DL (ref 2.5–4.9)
PLATELET # BLD AUTO: 119 K/UL (ref 135–420)
PMV BLD AUTO: 10.2 FL (ref 9.2–11.8)
POTASSIUM SERPL-SCNC: 4.4 MMOL/L (ref 3.5–5.5)
PROTHROMBIN TIME: 61.2 SEC (ref 12–15.1)
RBC # BLD AUTO: 3.44 M/UL (ref 4.35–5.65)
SERVICE CMNT-IMP: NORMAL
SODIUM SERPL-SCNC: 140 MMOL/L (ref 136–145)
T4 FREE SERPL-MCNC: 1.2 NG/DL (ref 0.9–1.7)
UNIT DIVISION: 0
UNIT ISSUE DATE/TIME: NORMAL
VANCOMYCIN SERPL-MCNC: 10.3 UG/ML (ref 5–40)
WBC # BLD AUTO: 6.7 K/UL (ref 4.6–13.2)

## 2025-08-28 PROCEDURE — 6360000002 HC RX W HCPCS

## 2025-08-28 PROCEDURE — 83036 HEMOGLOBIN GLYCOSYLATED A1C: CPT

## 2025-08-28 PROCEDURE — 94640 AIRWAY INHALATION TREATMENT: CPT

## 2025-08-28 PROCEDURE — 1100000003 HC PRIVATE W/ TELEMETRY

## 2025-08-28 PROCEDURE — 80202 ASSAY OF VANCOMYCIN: CPT

## 2025-08-28 PROCEDURE — 6360000002 HC RX W HCPCS: Performed by: INTERNAL MEDICINE

## 2025-08-28 PROCEDURE — 87040 BLOOD CULTURE FOR BACTERIA: CPT

## 2025-08-28 PROCEDURE — 85610 PROTHROMBIN TIME: CPT

## 2025-08-28 PROCEDURE — 2580000003 HC RX 258

## 2025-08-28 PROCEDURE — 82962 GLUCOSE BLOOD TEST: CPT

## 2025-08-28 PROCEDURE — 83735 ASSAY OF MAGNESIUM: CPT

## 2025-08-28 PROCEDURE — 2500000003 HC RX 250 WO HCPCS

## 2025-08-28 PROCEDURE — 6370000000 HC RX 637 (ALT 250 FOR IP)

## 2025-08-28 PROCEDURE — C8929 TTE W OR WO FOL WCON,DOPPLER: HCPCS

## 2025-08-28 PROCEDURE — 84100 ASSAY OF PHOSPHORUS: CPT

## 2025-08-28 PROCEDURE — 99232 SBSQ HOSP IP/OBS MODERATE 35: CPT | Performed by: INTERNAL MEDICINE

## 2025-08-28 PROCEDURE — 99291 CRITICAL CARE FIRST HOUR: CPT | Performed by: INTERNAL MEDICINE

## 2025-08-28 PROCEDURE — 6360000004 HC RX CONTRAST MEDICATION: Performed by: INTERNAL MEDICINE

## 2025-08-28 PROCEDURE — 80048 BASIC METABOLIC PNL TOTAL CA: CPT

## 2025-08-28 PROCEDURE — 93306 TTE W/DOPPLER COMPLETE: CPT | Performed by: INTERNAL MEDICINE

## 2025-08-28 PROCEDURE — 6360000002 HC RX W HCPCS: Performed by: PHYSICIAN ASSISTANT

## 2025-08-28 PROCEDURE — 36415 COLL VENOUS BLD VENIPUNCTURE: CPT

## 2025-08-28 PROCEDURE — 85025 COMPLETE CBC W/AUTO DIFF WBC: CPT

## 2025-08-28 PROCEDURE — 6370000000 HC RX 637 (ALT 250 FOR IP): Performed by: PHYSICIAN ASSISTANT

## 2025-08-28 PROCEDURE — 83605 ASSAY OF LACTIC ACID: CPT

## 2025-08-28 PROCEDURE — 84439 ASSAY OF FREE THYROXINE: CPT

## 2025-08-28 PROCEDURE — 94761 N-INVAS EAR/PLS OXIMETRY MLT: CPT

## 2025-08-28 PROCEDURE — APPSS30 APP SPLIT SHARED TIME 16-30 MINUTES: Performed by: PHYSICIAN ASSISTANT

## 2025-08-28 RX ORDER — ACETAMINOPHEN 160 MG/5ML
650 LIQUID ORAL EVERY 4 HOURS PRN
Status: DISCONTINUED | OUTPATIENT
Start: 2025-08-28 | End: 2025-08-30

## 2025-08-28 RX ORDER — BUDESONIDE 1 MG/2ML
1 INHALANT ORAL 2 TIMES DAILY
Status: DISCONTINUED | OUTPATIENT
Start: 2025-08-28 | End: 2025-08-29

## 2025-08-28 RX ORDER — IPRATROPIUM BROMIDE AND ALBUTEROL SULFATE 2.5; .5 MG/3ML; MG/3ML
1 SOLUTION RESPIRATORY (INHALATION) EVERY 6 HOURS PRN
Status: DISCONTINUED | OUTPATIENT
Start: 2025-08-28 | End: 2025-09-06 | Stop reason: HOSPADM

## 2025-08-28 RX ORDER — MAGNESIUM SULFATE HEPTAHYDRATE 40 MG/ML
2000 INJECTION, SOLUTION INTRAVENOUS ONCE
Status: COMPLETED | OUTPATIENT
Start: 2025-08-28 | End: 2025-08-28

## 2025-08-28 RX ORDER — ARFORMOTEROL TARTRATE 15 UG/2ML
15 SOLUTION RESPIRATORY (INHALATION)
Status: DISCONTINUED | OUTPATIENT
Start: 2025-08-28 | End: 2025-09-06 | Stop reason: HOSPADM

## 2025-08-28 RX ADMIN — ARFORMOTEROL TARTRATE 15 MCG: 15 SOLUTION RESPIRATORY (INHALATION) at 19:48

## 2025-08-28 RX ADMIN — BUDESONIDE 1 MG: 1 SUSPENSION RESPIRATORY (INHALATION) at 19:48

## 2025-08-28 RX ADMIN — ACETAMINOPHEN 650 MG: 650 SOLUTION ORAL at 19:20

## 2025-08-28 RX ADMIN — INSULIN LISPRO 2 UNITS: 100 INJECTION, SOLUTION INTRAVENOUS; SUBCUTANEOUS at 19:43

## 2025-08-28 RX ADMIN — SULFUR HEXAFLUORIDE 2 ML: KIT at 10:15

## 2025-08-28 RX ADMIN — PIPERACILLIN AND TAZOBACTAM 3375 MG: 3; .375 INJECTION, POWDER, LYOPHILIZED, FOR SOLUTION INTRAVENOUS at 02:31

## 2025-08-28 RX ADMIN — INSULIN LISPRO 1 UNITS: 100 INJECTION, SOLUTION INTRAVENOUS; SUBCUTANEOUS at 14:12

## 2025-08-28 RX ADMIN — ACETAMINOPHEN 650 MG: 650 SOLUTION ORAL at 00:25

## 2025-08-28 RX ADMIN — MAGNESIUM SULFATE HEPTAHYDRATE 2000 MG: 40 INJECTION, SOLUTION INTRAVENOUS at 06:14

## 2025-08-28 RX ADMIN — PIPERACILLIN AND TAZOBACTAM 3375 MG: 3; .375 INJECTION, POWDER, LYOPHILIZED, FOR SOLUTION INTRAVENOUS at 19:41

## 2025-08-28 RX ADMIN — PIPERACILLIN AND TAZOBACTAM 3375 MG: 3; .375 INJECTION, POWDER, LYOPHILIZED, FOR SOLUTION INTRAVENOUS at 10:03

## 2025-08-28 RX ADMIN — ACETAMINOPHEN 650 MG: 650 SOLUTION ORAL at 14:12

## 2025-08-28 RX ADMIN — SODIUM CHLORIDE, PRESERVATIVE FREE 40 MG: 5 INJECTION INTRAVENOUS at 08:59

## 2025-08-28 RX ADMIN — VANCOMYCIN 1250 MG: 1.75 INJECTION, SOLUTION INTRAVENOUS at 09:19

## 2025-08-28 ASSESSMENT — PAIN SCALES - GENERAL
PAINLEVEL_OUTOF10: 0
PAINLEVEL_OUTOF10: 1
PAINLEVEL_OUTOF10: 6

## 2025-08-28 ASSESSMENT — PAIN - FUNCTIONAL ASSESSMENT
PAIN_FUNCTIONAL_ASSESSMENT: 0-10
PAIN_FUNCTIONAL_ASSESSMENT: 0-10

## 2025-08-28 ASSESSMENT — PAIN DESCRIPTION - DESCRIPTORS
DESCRIPTORS: ACHING
DESCRIPTORS: ACHING

## 2025-08-28 ASSESSMENT — PAIN DESCRIPTION - LOCATION
LOCATION: NECK
LOCATION: NECK

## 2025-08-29 ENCOUNTER — APPOINTMENT (OUTPATIENT)
Facility: HOSPITAL | Age: 80
End: 2025-08-29
Payer: MEDICARE

## 2025-08-29 LAB
ANION GAP SERPL CALC-SCNC: 8 MMOL/L (ref 3–18)
BACTERIA SPEC CULT: ABNORMAL
BACTERIA SPEC CULT: ABNORMAL
BASOPHILS # BLD: 0 K/UL (ref 0–0.1)
BASOPHILS NFR BLD: 0 % (ref 0–2)
BUN SERPL-MCNC: 23 MG/DL (ref 6–23)
BUN/CREAT SERPL: 24 (ref 12–20)
CALCIUM SERPL-MCNC: 8.4 MG/DL (ref 8.5–10.1)
CHLORIDE SERPL-SCNC: 110 MMOL/L (ref 98–107)
CO2 SERPL-SCNC: 21 MMOL/L (ref 21–32)
CREAT SERPL-MCNC: 0.95 MG/DL (ref 0.6–1.3)
DIFFERENTIAL METHOD BLD: ABNORMAL
EKG ATRIAL RATE: 59 BPM
EKG DIAGNOSIS: NORMAL
EKG P AXIS: 57 DEGREES
EKG P-R INTERVAL: 136 MS
EKG Q-T INTERVAL: 482 MS
EKG QRS DURATION: 104 MS
EKG QTC CALCULATION (BAZETT): 477 MS
EKG R AXIS: 21 DEGREES
EKG T AXIS: 239 DEGREES
EKG VENTRICULAR RATE: 59 BPM
EOSINOPHIL # BLD: 0 K/UL (ref 0–0.4)
EOSINOPHIL NFR BLD: 0 % (ref 0–5)
ERYTHROCYTE [DISTWIDTH] IN BLOOD BY AUTOMATED COUNT: 16.6 % (ref 11.6–14.5)
GLUCOSE BLD STRIP.AUTO-MCNC: 178 MG/DL (ref 70–110)
GLUCOSE BLD STRIP.AUTO-MCNC: 203 MG/DL (ref 70–110)
GLUCOSE BLD STRIP.AUTO-MCNC: 251 MG/DL (ref 70–110)
GLUCOSE BLD STRIP.AUTO-MCNC: 317 MG/DL (ref 70–110)
GLUCOSE BLD STRIP.AUTO-MCNC: 343 MG/DL (ref 70–110)
GLUCOSE SERPL-MCNC: 168 MG/DL (ref 74–108)
GRAM STN SPEC: ABNORMAL
HCT VFR BLD AUTO: 30.7 % (ref 36–48)
HGB BLD-MCNC: 10.1 G/DL (ref 13–16)
IMM GRANULOCYTES # BLD AUTO: 0 K/UL (ref 0–0.04)
IMM GRANULOCYTES NFR BLD AUTO: 0 % (ref 0–0.5)
INR PPP: 6.3 (ref 0.9–1.2)
LYMPHOCYTES # BLD: 0.73 K/UL (ref 0.9–3.6)
LYMPHOCYTES NFR BLD: 12 % (ref 21–52)
MAGNESIUM SERPL-MCNC: 1.7 MG/DL (ref 1.6–2.6)
MCH RBC QN AUTO: 28.9 PG (ref 24–34)
MCHC RBC AUTO-ENTMCNC: 32.9 G/DL (ref 31–37)
MCV RBC AUTO: 87.7 FL (ref 78–100)
MONOCYTES # BLD: 0.18 K/UL (ref 0.05–1.2)
MONOCYTES NFR BLD: 3 % (ref 3–10)
NEUTS BAND NFR BLD MANUAL: 2 %
NEUTS SEG # BLD: 5.19 K/UL (ref 1.8–8)
NEUTS SEG NFR BLD: 83 % (ref 40–73)
NRBC # BLD: 0 K/UL (ref 0–0.01)
NRBC BLD-RTO: 0 PER 100 WBC
PHOSPHATE SERPL-MCNC: 2.1 MG/DL (ref 2.5–4.9)
PLATELET # BLD AUTO: 107 K/UL (ref 135–420)
PLATELET COMMENT: ABNORMAL
PMV BLD AUTO: 10.6 FL (ref 9.2–11.8)
POTASSIUM SERPL-SCNC: 4.4 MMOL/L (ref 3.5–5.5)
PROTHROMBIN TIME: 56 SEC (ref 12–15.1)
RBC # BLD AUTO: 3.5 M/UL (ref 4.35–5.65)
RBC MORPH BLD: ABNORMAL
SERVICE CMNT-IMP: ABNORMAL
SERVICE CMNT-IMP: ABNORMAL
SODIUM SERPL-SCNC: 140 MMOL/L (ref 136–145)
WBC # BLD AUTO: 6.1 K/UL (ref 4.6–13.2)

## 2025-08-29 PROCEDURE — 1100000003 HC PRIVATE W/ TELEMETRY

## 2025-08-29 PROCEDURE — 93010 ELECTROCARDIOGRAM REPORT: CPT | Performed by: INTERNAL MEDICINE

## 2025-08-29 PROCEDURE — 6360000002 HC RX W HCPCS

## 2025-08-29 PROCEDURE — 82962 GLUCOSE BLOOD TEST: CPT

## 2025-08-29 PROCEDURE — 85610 PROTHROMBIN TIME: CPT

## 2025-08-29 PROCEDURE — 2500000003 HC RX 250 WO HCPCS

## 2025-08-29 PROCEDURE — 84100 ASSAY OF PHOSPHORUS: CPT

## 2025-08-29 PROCEDURE — 36415 COLL VENOUS BLD VENIPUNCTURE: CPT

## 2025-08-29 PROCEDURE — 6360000002 HC RX W HCPCS: Performed by: PHYSICIAN ASSISTANT

## 2025-08-29 PROCEDURE — 6370000000 HC RX 637 (ALT 250 FOR IP): Performed by: PHYSICIAN ASSISTANT

## 2025-08-29 PROCEDURE — 72040 X-RAY EXAM NECK SPINE 2-3 VW: CPT

## 2025-08-29 PROCEDURE — 94640 AIRWAY INHALATION TREATMENT: CPT

## 2025-08-29 PROCEDURE — 6360000002 HC RX W HCPCS: Performed by: STUDENT IN AN ORGANIZED HEALTH CARE EDUCATION/TRAINING PROGRAM

## 2025-08-29 PROCEDURE — 6370000000 HC RX 637 (ALT 250 FOR IP): Performed by: FAMILY MEDICINE

## 2025-08-29 PROCEDURE — 85025 COMPLETE CBC W/AUTO DIFF WBC: CPT

## 2025-08-29 PROCEDURE — 80048 BASIC METABOLIC PNL TOTAL CA: CPT

## 2025-08-29 PROCEDURE — 99232 SBSQ HOSP IP/OBS MODERATE 35: CPT | Performed by: INTERNAL MEDICINE

## 2025-08-29 PROCEDURE — 99233 SBSQ HOSP IP/OBS HIGH 50: CPT | Performed by: STUDENT IN AN ORGANIZED HEALTH CARE EDUCATION/TRAINING PROGRAM

## 2025-08-29 PROCEDURE — 6360000002 HC RX W HCPCS: Performed by: INTERNAL MEDICINE

## 2025-08-29 PROCEDURE — 2580000003 HC RX 258: Performed by: INTERNAL MEDICINE

## 2025-08-29 PROCEDURE — 94761 N-INVAS EAR/PLS OXIMETRY MLT: CPT

## 2025-08-29 PROCEDURE — 2580000003 HC RX 258

## 2025-08-29 PROCEDURE — 83735 ASSAY OF MAGNESIUM: CPT

## 2025-08-29 PROCEDURE — 6370000000 HC RX 637 (ALT 250 FOR IP): Performed by: STUDENT IN AN ORGANIZED HEALTH CARE EDUCATION/TRAINING PROGRAM

## 2025-08-29 PROCEDURE — 94664 DEMO&/EVAL PT USE INHALER: CPT

## 2025-08-29 RX ORDER — METOPROLOL TARTRATE 25 MG/1
25 TABLET, FILM COATED ORAL 2 TIMES DAILY
Status: DISCONTINUED | OUTPATIENT
Start: 2025-08-29 | End: 2025-08-29

## 2025-08-29 RX ORDER — METOPROLOL TARTRATE 25 MG/1
25 TABLET, FILM COATED ORAL 2 TIMES DAILY
Status: COMPLETED | OUTPATIENT
Start: 2025-08-29 | End: 2025-09-02

## 2025-08-29 RX ORDER — OXYCODONE HYDROCHLORIDE 5 MG/1
5 TABLET ORAL EVERY 6 HOURS PRN
Status: DISCONTINUED | OUTPATIENT
Start: 2025-08-29 | End: 2025-09-06 | Stop reason: HOSPADM

## 2025-08-29 RX ORDER — INSULIN LISPRO 100 [IU]/ML
0-4 INJECTION, SOLUTION INTRAVENOUS; SUBCUTANEOUS
Status: DISCONTINUED | OUTPATIENT
Start: 2025-08-29 | End: 2025-08-30

## 2025-08-29 RX ORDER — BUDESONIDE 1 MG/2ML
1 INHALANT ORAL
Status: DISCONTINUED | OUTPATIENT
Start: 2025-08-29 | End: 2025-09-06 | Stop reason: HOSPADM

## 2025-08-29 RX ORDER — INSULIN LISPRO 100 [IU]/ML
2 INJECTION, SOLUTION INTRAVENOUS; SUBCUTANEOUS
Status: COMPLETED | OUTPATIENT
Start: 2025-08-29 | End: 2025-08-29

## 2025-08-29 RX ORDER — LIDOCAINE 4 G/G
1 PATCH TOPICAL DAILY
Status: DISCONTINUED | OUTPATIENT
Start: 2025-08-29 | End: 2025-09-06 | Stop reason: HOSPADM

## 2025-08-29 RX ADMIN — INSULIN LISPRO 1 UNITS: 100 INJECTION, SOLUTION INTRAVENOUS; SUBCUTANEOUS at 08:55

## 2025-08-29 RX ADMIN — ARFORMOTEROL TARTRATE 15 MCG: 15 SOLUTION RESPIRATORY (INHALATION) at 10:25

## 2025-08-29 RX ADMIN — PIPERACILLIN AND TAZOBACTAM 3375 MG: 3; .375 INJECTION, POWDER, LYOPHILIZED, FOR SOLUTION INTRAVENOUS at 10:10

## 2025-08-29 RX ADMIN — INSULIN LISPRO 3 UNITS: 100 INJECTION, SOLUTION INTRAVENOUS; SUBCUTANEOUS at 17:15

## 2025-08-29 RX ADMIN — NAFCILLIN SODIUM 2000 MG: 2 INJECTION, POWDER, LYOPHILIZED, FOR SOLUTION INTRAMUSCULAR; INTRAVENOUS at 13:51

## 2025-08-29 RX ADMIN — ARFORMOTEROL TARTRATE 15 MCG: 15 SOLUTION RESPIRATORY (INHALATION) at 20:19

## 2025-08-29 RX ADMIN — HEPARIN SODIUM 5000 UNITS: 5000 INJECTION, SOLUTION INTRAVENOUS; SUBCUTANEOUS at 21:26

## 2025-08-29 RX ADMIN — PIPERACILLIN AND TAZOBACTAM 3375 MG: 3; .375 INJECTION, POWDER, LYOPHILIZED, FOR SOLUTION INTRAVENOUS at 03:05

## 2025-08-29 RX ADMIN — VANCOMYCIN 1250 MG: 1.75 INJECTION, SOLUTION INTRAVENOUS at 09:00

## 2025-08-29 RX ADMIN — OXYCODONE HYDROCHLORIDE 5 MG: 5 TABLET ORAL at 08:56

## 2025-08-29 RX ADMIN — SODIUM CHLORIDE, PRESERVATIVE FREE 40 MG: 5 INJECTION INTRAVENOUS at 08:38

## 2025-08-29 RX ADMIN — INSULIN LISPRO 3 UNITS: 100 INJECTION, SOLUTION INTRAVENOUS; SUBCUTANEOUS at 20:17

## 2025-08-29 RX ADMIN — BUDESONIDE 1 MG: 1 SUSPENSION RESPIRATORY (INHALATION) at 10:25

## 2025-08-29 RX ADMIN — INSULIN LISPRO 2 UNITS: 100 INJECTION, SOLUTION INTRAVENOUS; SUBCUTANEOUS at 13:45

## 2025-08-29 RX ADMIN — NAFCILLIN SODIUM 2000 MG: 2 INJECTION, POWDER, LYOPHILIZED, FOR SOLUTION INTRAMUSCULAR; INTRAVENOUS at 21:26

## 2025-08-29 RX ADMIN — HEPARIN SODIUM 5000 UNITS: 5000 INJECTION, SOLUTION INTRAVENOUS; SUBCUTANEOUS at 13:49

## 2025-08-29 RX ADMIN — METOPROLOL TARTRATE 25 MG: 25 TABLET, FILM COATED ORAL at 19:41

## 2025-08-29 RX ADMIN — OXYCODONE HYDROCHLORIDE 5 MG: 5 TABLET ORAL at 17:28

## 2025-08-29 RX ADMIN — BUDESONIDE 1 MG: 1 SUSPENSION RESPIRATORY (INHALATION) at 20:19

## 2025-08-29 RX ADMIN — NAFCILLIN SODIUM 2000 MG: 2 INJECTION, POWDER, LYOPHILIZED, FOR SOLUTION INTRAMUSCULAR; INTRAVENOUS at 17:34

## 2025-08-29 ASSESSMENT — PAIN SCALES - GENERAL
PAINLEVEL_OUTOF10: 0
PAINLEVEL_OUTOF10: 9
PAINLEVEL_OUTOF10: 0
PAINLEVEL_OUTOF10: 4
PAINLEVEL_OUTOF10: 10
PAINLEVEL_OUTOF10: 8
PAINLEVEL_OUTOF10: 0
PAINLEVEL_OUTOF10: 7

## 2025-08-29 ASSESSMENT — PAIN - FUNCTIONAL ASSESSMENT
PAIN_FUNCTIONAL_ASSESSMENT: 0-10
PAIN_FUNCTIONAL_ASSESSMENT: PREVENTS OR INTERFERES WITH MANY ACTIVE NOT PASSIVE ACTIVITIES
PAIN_FUNCTIONAL_ASSESSMENT: 0-10
PAIN_FUNCTIONAL_ASSESSMENT: PREVENTS OR INTERFERES WITH MANY ACTIVE NOT PASSIVE ACTIVITIES

## 2025-08-29 ASSESSMENT — PAIN DESCRIPTION - ONSET: ONSET: ON-GOING

## 2025-08-29 ASSESSMENT — PAIN DESCRIPTION - DESCRIPTORS
DESCRIPTORS: ACHING;DISCOMFORT
DESCRIPTORS: ACHING;TIGHTNESS;DISCOMFORT

## 2025-08-29 ASSESSMENT — PAIN DESCRIPTION - ORIENTATION
ORIENTATION: POSTERIOR
ORIENTATION: POSTERIOR

## 2025-08-29 ASSESSMENT — PAIN DESCRIPTION - FREQUENCY: FREQUENCY: CONTINUOUS

## 2025-08-29 ASSESSMENT — PAIN DESCRIPTION - LOCATION
LOCATION: NECK
LOCATION: NECK

## 2025-08-29 ASSESSMENT — PAIN DESCRIPTION - PAIN TYPE: TYPE: ACUTE PAIN

## 2025-08-30 ENCOUNTER — APPOINTMENT (OUTPATIENT)
Facility: HOSPITAL | Age: 80
End: 2025-08-30
Attending: STUDENT IN AN ORGANIZED HEALTH CARE EDUCATION/TRAINING PROGRAM
Payer: MEDICARE

## 2025-08-30 LAB
ALBUMIN SERPL-MCNC: 1.8 G/DL (ref 3.4–5)
ALBUMIN/GLOB SERPL: 0.5 (ref 0.8–1.7)
ALP SERPL-CCNC: 111 U/L (ref 45–117)
ALT SERPL-CCNC: 57 U/L (ref 10–50)
ANION GAP SERPL CALC-SCNC: 10 MMOL/L (ref 3–18)
APTT PPP: 116.3 SEC (ref 21.7–34.2)
APTT PPP: >180 SEC (ref 21.7–34.2)
AST SERPL-CCNC: 56 U/L (ref 10–38)
BACTERIA SPEC CULT: NORMAL
BACTERIA SPEC CULT: NORMAL
BASOPHILS # BLD: 0.01 K/UL (ref 0–0.1)
BASOPHILS NFR BLD: 0.2 % (ref 0–2)
BILIRUB DIRECT SERPL-MCNC: 0.3 MG/DL (ref 0–0.2)
BILIRUB SERPL-MCNC: 0.5 MG/DL (ref 0.2–1)
BUN SERPL-MCNC: 18 MG/DL (ref 6–23)
BUN/CREAT SERPL: 18 (ref 12–20)
CALCIUM SERPL-MCNC: 8.8 MG/DL (ref 8.5–10.1)
CHLORIDE SERPL-SCNC: 109 MMOL/L (ref 98–107)
CO2 SERPL-SCNC: 20 MMOL/L (ref 21–32)
CREAT SERPL-MCNC: 0.99 MG/DL (ref 0.6–1.3)
DIFFERENTIAL METHOD BLD: ABNORMAL
EOSINOPHIL # BLD: 0.07 K/UL (ref 0–0.4)
EOSINOPHIL NFR BLD: 1.1 % (ref 0–5)
ERYTHROCYTE [DISTWIDTH] IN BLOOD BY AUTOMATED COUNT: 16.6 % (ref 11.6–14.5)
GLOBULIN SER CALC-MCNC: 3.4 G/DL (ref 2–4)
GLUCOSE BLD STRIP.AUTO-MCNC: 231 MG/DL (ref 70–110)
GLUCOSE BLD STRIP.AUTO-MCNC: 237 MG/DL (ref 70–110)
GLUCOSE BLD STRIP.AUTO-MCNC: 242 MG/DL (ref 70–110)
GLUCOSE BLD STRIP.AUTO-MCNC: 257 MG/DL (ref 70–110)
GLUCOSE SERPL-MCNC: 191 MG/DL (ref 74–108)
HCT VFR BLD AUTO: 31.1 % (ref 36–48)
HGB BLD-MCNC: 10.2 G/DL (ref 13–16)
IMM GRANULOCYTES # BLD AUTO: 0.03 K/UL (ref 0–0.04)
IMM GRANULOCYTES NFR BLD AUTO: 0.5 % (ref 0–0.5)
INR PPP: 4.7 (ref 0.9–1.2)
LYMPHOCYTES # BLD: 1.01 K/UL (ref 0.9–3.6)
LYMPHOCYTES NFR BLD: 15.6 % (ref 21–52)
MAGNESIUM SERPL-MCNC: 1.5 MG/DL (ref 1.6–2.6)
MCH RBC QN AUTO: 28.6 PG (ref 24–34)
MCHC RBC AUTO-ENTMCNC: 32.8 G/DL (ref 31–37)
MCV RBC AUTO: 87.1 FL (ref 78–100)
MONOCYTES # BLD: 0.43 K/UL (ref 0.05–1.2)
MONOCYTES NFR BLD: 6.7 % (ref 3–10)
NEUTS SEG # BLD: 4.91 K/UL (ref 1.8–8)
NEUTS SEG NFR BLD: 75.9 % (ref 40–73)
NRBC # BLD: 0 K/UL (ref 0–0.01)
NRBC BLD-RTO: 0 PER 100 WBC
PHOSPHATE SERPL-MCNC: 2.5 MG/DL (ref 2.5–4.9)
PLATELET # BLD AUTO: 119 K/UL (ref 135–420)
PMV BLD AUTO: 10.7 FL (ref 9.2–11.8)
POTASSIUM SERPL-SCNC: 4.5 MMOL/L (ref 3.5–5.5)
PROT SERPL-MCNC: 5.2 G/DL (ref 6.4–8.2)
PROTHROMBIN TIME: 44.9 SEC (ref 12–15.1)
RBC # BLD AUTO: 3.57 M/UL (ref 4.35–5.65)
SERVICE CMNT-IMP: NORMAL
SODIUM SERPL-SCNC: 139 MMOL/L (ref 136–145)
WBC # BLD AUTO: 6.5 K/UL (ref 4.6–13.2)

## 2025-08-30 PROCEDURE — 82962 GLUCOSE BLOOD TEST: CPT

## 2025-08-30 PROCEDURE — 83735 ASSAY OF MAGNESIUM: CPT

## 2025-08-30 PROCEDURE — 85610 PROTHROMBIN TIME: CPT

## 2025-08-30 PROCEDURE — 1100000003 HC PRIVATE W/ TELEMETRY

## 2025-08-30 PROCEDURE — 99233 SBSQ HOSP IP/OBS HIGH 50: CPT | Performed by: STUDENT IN AN ORGANIZED HEALTH CARE EDUCATION/TRAINING PROGRAM

## 2025-08-30 PROCEDURE — 87040 BLOOD CULTURE FOR BACTERIA: CPT

## 2025-08-30 PROCEDURE — 80048 BASIC METABOLIC PNL TOTAL CA: CPT

## 2025-08-30 PROCEDURE — 94640 AIRWAY INHALATION TREATMENT: CPT

## 2025-08-30 PROCEDURE — 2580000003 HC RX 258: Performed by: INTERNAL MEDICINE

## 2025-08-30 PROCEDURE — 2700000000 HC OXYGEN THERAPY PER DAY

## 2025-08-30 PROCEDURE — 6360000002 HC RX W HCPCS: Performed by: STUDENT IN AN ORGANIZED HEALTH CARE EDUCATION/TRAINING PROGRAM

## 2025-08-30 PROCEDURE — 6360000002 HC RX W HCPCS: Performed by: PHYSICIAN ASSISTANT

## 2025-08-30 PROCEDURE — 6370000000 HC RX 637 (ALT 250 FOR IP): Performed by: STUDENT IN AN ORGANIZED HEALTH CARE EDUCATION/TRAINING PROGRAM

## 2025-08-30 PROCEDURE — 85025 COMPLETE CBC W/AUTO DIFF WBC: CPT

## 2025-08-30 PROCEDURE — 80076 HEPATIC FUNCTION PANEL: CPT

## 2025-08-30 PROCEDURE — 6360000002 HC RX W HCPCS: Performed by: INTERNAL MEDICINE

## 2025-08-30 PROCEDURE — 6370000000 HC RX 637 (ALT 250 FOR IP): Performed by: FAMILY MEDICINE

## 2025-08-30 PROCEDURE — 84100 ASSAY OF PHOSPHORUS: CPT

## 2025-08-30 PROCEDURE — 85730 THROMBOPLASTIN TIME PARTIAL: CPT

## 2025-08-30 PROCEDURE — 36415 COLL VENOUS BLD VENIPUNCTURE: CPT

## 2025-08-30 PROCEDURE — 94761 N-INVAS EAR/PLS OXIMETRY MLT: CPT

## 2025-08-30 RX ORDER — HEPARIN SODIUM 1000 [USP'U]/ML
4000 INJECTION, SOLUTION INTRAVENOUS; SUBCUTANEOUS ONCE
Status: COMPLETED | OUTPATIENT
Start: 2025-08-30 | End: 2025-08-30

## 2025-08-30 RX ORDER — HEPARIN SODIUM 1000 [USP'U]/ML
2000 INJECTION, SOLUTION INTRAVENOUS; SUBCUTANEOUS PRN
Status: DISCONTINUED | OUTPATIENT
Start: 2025-08-30 | End: 2025-09-06

## 2025-08-30 RX ORDER — DIAZEPAM 10 MG/2ML
5 INJECTION, SOLUTION INTRAMUSCULAR; INTRAVENOUS
Status: DISCONTINUED | OUTPATIENT
Start: 2025-08-30 | End: 2025-09-04

## 2025-08-30 RX ORDER — ACETAMINOPHEN 325 MG/1
650 TABLET ORAL EVERY 6 HOURS PRN
Status: DISCONTINUED | OUTPATIENT
Start: 2025-08-30 | End: 2025-09-06 | Stop reason: HOSPADM

## 2025-08-30 RX ORDER — INSULIN LISPRO 100 [IU]/ML
0-8 INJECTION, SOLUTION INTRAVENOUS; SUBCUTANEOUS
Status: DISCONTINUED | OUTPATIENT
Start: 2025-08-30 | End: 2025-09-06 | Stop reason: HOSPADM

## 2025-08-30 RX ORDER — HEPARIN SODIUM 10000 [USP'U]/100ML
1-30 INJECTION, SOLUTION INTRAVENOUS CONTINUOUS
Status: DISCONTINUED | OUTPATIENT
Start: 2025-08-30 | End: 2025-09-06

## 2025-08-30 RX ORDER — HYDRALAZINE HYDROCHLORIDE 20 MG/ML
10 INJECTION INTRAMUSCULAR; INTRAVENOUS EVERY 6 HOURS PRN
Status: DISCONTINUED | OUTPATIENT
Start: 2025-08-30 | End: 2025-09-06 | Stop reason: HOSPADM

## 2025-08-30 RX ORDER — NITROGLYCERIN 0.4 MG/1
0.4 TABLET SUBLINGUAL EVERY 5 MIN PRN
Status: DISCONTINUED | OUTPATIENT
Start: 2025-08-30 | End: 2025-09-06 | Stop reason: HOSPADM

## 2025-08-30 RX ORDER — HEPARIN SODIUM 1000 [USP'U]/ML
4000 INJECTION, SOLUTION INTRAVENOUS; SUBCUTANEOUS PRN
Status: DISCONTINUED | OUTPATIENT
Start: 2025-08-30 | End: 2025-09-06

## 2025-08-30 RX ORDER — DIGOXIN 0.25 MG/ML
250 INJECTION INTRAMUSCULAR; INTRAVENOUS EVERY 6 HOURS
Status: COMPLETED | OUTPATIENT
Start: 2025-08-30 | End: 2025-08-31

## 2025-08-30 RX ORDER — BENZOCAINE/MENTHOL 6 MG-10 MG
LOZENGE MUCOUS MEMBRANE 4 TIMES DAILY
Status: DISCONTINUED | OUTPATIENT
Start: 2025-08-30 | End: 2025-09-06 | Stop reason: HOSPADM

## 2025-08-30 RX ORDER — POLYETHYLENE GLYCOL 3350 17 G/17G
17 POWDER, FOR SOLUTION ORAL DAILY PRN
Status: DISCONTINUED | OUTPATIENT
Start: 2025-08-30 | End: 2025-09-06 | Stop reason: HOSPADM

## 2025-08-30 RX ORDER — MAGNESIUM SULFATE IN WATER 40 MG/ML
2000 INJECTION, SOLUTION INTRAVENOUS PRN
Status: DISCONTINUED | OUTPATIENT
Start: 2025-08-30 | End: 2025-09-06 | Stop reason: HOSPADM

## 2025-08-30 RX ORDER — ROSUVASTATIN CALCIUM 20 MG/1
20 TABLET, COATED ORAL NIGHTLY
Status: DISCONTINUED | OUTPATIENT
Start: 2025-08-30 | End: 2025-09-06 | Stop reason: HOSPADM

## 2025-08-30 RX ORDER — PANTOPRAZOLE SODIUM 40 MG/1
40 TABLET, DELAYED RELEASE ORAL
Status: DISCONTINUED | OUTPATIENT
Start: 2025-08-31 | End: 2025-09-06 | Stop reason: HOSPADM

## 2025-08-30 RX ORDER — DIGOXIN 0.25 MG/ML
125 INJECTION INTRAMUSCULAR; INTRAVENOUS ONCE
Status: COMPLETED | OUTPATIENT
Start: 2025-08-30 | End: 2025-08-30

## 2025-08-30 RX ORDER — INSULIN GLARGINE 100 [IU]/ML
10 INJECTION, SOLUTION SUBCUTANEOUS NIGHTLY
Status: DISCONTINUED | OUTPATIENT
Start: 2025-08-30 | End: 2025-08-31

## 2025-08-30 RX ORDER — NALOXONE HYDROCHLORIDE 0.4 MG/ML
0.4 INJECTION, SOLUTION INTRAMUSCULAR; INTRAVENOUS; SUBCUTANEOUS PRN
Status: DISCONTINUED | OUTPATIENT
Start: 2025-08-30 | End: 2025-09-06 | Stop reason: HOSPADM

## 2025-08-30 RX ORDER — DEXTROSE MONOHYDRATE 100 MG/ML
INJECTION, SOLUTION INTRAVENOUS CONTINUOUS PRN
Status: DISCONTINUED | OUTPATIENT
Start: 2025-08-30 | End: 2025-09-06 | Stop reason: HOSPADM

## 2025-08-30 RX ORDER — POTASSIUM CHLORIDE 1500 MG/1
40 TABLET, EXTENDED RELEASE ORAL PRN
Status: DISCONTINUED | OUTPATIENT
Start: 2025-08-30 | End: 2025-09-06 | Stop reason: HOSPADM

## 2025-08-30 RX ORDER — POTASSIUM CHLORIDE 7.45 MG/ML
10 INJECTION INTRAVENOUS PRN
Status: DISCONTINUED | OUTPATIENT
Start: 2025-08-30 | End: 2025-09-06 | Stop reason: HOSPADM

## 2025-08-30 RX ORDER — BENZONATATE 100 MG/1
200 CAPSULE ORAL 3 TIMES DAILY PRN
Status: DISCONTINUED | OUTPATIENT
Start: 2025-08-30 | End: 2025-09-06 | Stop reason: HOSPADM

## 2025-08-30 RX ADMIN — HEPARIN SODIUM AND DEXTROSE 11 UNITS/KG/HR: 10000; 5 INJECTION INTRAVENOUS at 10:00

## 2025-08-30 RX ADMIN — INSULIN LISPRO 2 UNITS: 100 INJECTION, SOLUTION INTRAVENOUS; SUBCUTANEOUS at 12:53

## 2025-08-30 RX ADMIN — NAFCILLIN SODIUM 2000 MG: 2 INJECTION, POWDER, LYOPHILIZED, FOR SOLUTION INTRAMUSCULAR; INTRAVENOUS at 22:58

## 2025-08-30 RX ADMIN — DIGOXIN 125 MCG: 0.25 INJECTION INTRAMUSCULAR; INTRAVENOUS at 12:55

## 2025-08-30 RX ADMIN — NAFCILLIN SODIUM 2000 MG: 2 INJECTION, POWDER, LYOPHILIZED, FOR SOLUTION INTRAMUSCULAR; INTRAVENOUS at 01:39

## 2025-08-30 RX ADMIN — NAFCILLIN SODIUM 2000 MG: 2 INJECTION, POWDER, LYOPHILIZED, FOR SOLUTION INTRAMUSCULAR; INTRAVENOUS at 17:50

## 2025-08-30 RX ADMIN — HEPARIN SODIUM 4000 UNITS: 1000 INJECTION, SOLUTION INTRAVENOUS; SUBCUTANEOUS at 10:00

## 2025-08-30 RX ADMIN — HYDROCORTISONE: 1 CREAM TOPICAL at 14:46

## 2025-08-30 RX ADMIN — INSULIN LISPRO 4 UNITS: 100 INJECTION, SOLUTION INTRAVENOUS; SUBCUTANEOUS at 17:36

## 2025-08-30 RX ADMIN — OXYCODONE HYDROCHLORIDE 5 MG: 5 TABLET ORAL at 05:01

## 2025-08-30 RX ADMIN — METOPROLOL TARTRATE 25 MG: 25 TABLET, FILM COATED ORAL at 20:54

## 2025-08-30 RX ADMIN — BUDESONIDE 1 MG: 1 SUSPENSION RESPIRATORY (INHALATION) at 08:14

## 2025-08-30 RX ADMIN — MAGNESIUM SULFATE HEPTAHYDRATE 2000 MG: 40 INJECTION, SOLUTION INTRAVENOUS at 10:37

## 2025-08-30 RX ADMIN — ACETAMINOPHEN 650 MG: 325 TABLET ORAL at 10:30

## 2025-08-30 RX ADMIN — LEVOTHYROXINE SODIUM 125 MCG: 125 TABLET ORAL at 06:04

## 2025-08-30 RX ADMIN — DIGOXIN 250 MCG: 0.25 INJECTION INTRAMUSCULAR; INTRAVENOUS at 17:38

## 2025-08-30 RX ADMIN — ARFORMOTEROL TARTRATE 15 MCG: 15 SOLUTION RESPIRATORY (INHALATION) at 19:38

## 2025-08-30 RX ADMIN — HEPARIN SODIUM 5000 UNITS: 5000 INJECTION, SOLUTION INTRAVENOUS; SUBCUTANEOUS at 05:03

## 2025-08-30 RX ADMIN — OXYCODONE HYDROCHLORIDE 5 MG: 5 TABLET ORAL at 23:01

## 2025-08-30 RX ADMIN — HYDROCORTISONE: 1 CREAM TOPICAL at 20:59

## 2025-08-30 RX ADMIN — INSULIN GLARGINE 10 UNITS: 100 INJECTION, SOLUTION SUBCUTANEOUS at 20:57

## 2025-08-30 RX ADMIN — NAFCILLIN SODIUM 2000 MG: 2 INJECTION, POWDER, LYOPHILIZED, FOR SOLUTION INTRAMUSCULAR; INTRAVENOUS at 14:51

## 2025-08-30 RX ADMIN — NAFCILLIN SODIUM 2000 MG: 2 INJECTION, POWDER, LYOPHILIZED, FOR SOLUTION INTRAMUSCULAR; INTRAVENOUS at 09:47

## 2025-08-30 RX ADMIN — NAFCILLIN SODIUM 2000 MG: 2 INJECTION, POWDER, LYOPHILIZED, FOR SOLUTION INTRAMUSCULAR; INTRAVENOUS at 06:04

## 2025-08-30 RX ADMIN — BUDESONIDE 1 MG: 1 SUSPENSION RESPIRATORY (INHALATION) at 19:38

## 2025-08-30 RX ADMIN — METOPROLOL TARTRATE 25 MG: 25 TABLET, FILM COATED ORAL at 09:38

## 2025-08-30 RX ADMIN — ARFORMOTEROL TARTRATE 15 MCG: 15 SOLUTION RESPIRATORY (INHALATION) at 08:14

## 2025-08-30 RX ADMIN — INSULIN LISPRO 2 UNITS: 100 INJECTION, SOLUTION INTRAVENOUS; SUBCUTANEOUS at 20:57

## 2025-08-30 RX ADMIN — HYDROCORTISONE: 1 CREAM TOPICAL at 17:46

## 2025-08-30 RX ADMIN — INSULIN LISPRO 1 UNITS: 100 INJECTION, SOLUTION INTRAVENOUS; SUBCUTANEOUS at 09:35

## 2025-08-30 RX ADMIN — ROSUVASTATIN CALCIUM 20 MG: 20 TABLET, FILM COATED ORAL at 20:54

## 2025-08-30 RX ADMIN — ACETAMINOPHEN 650 MG: 325 TABLET ORAL at 18:42

## 2025-08-30 ASSESSMENT — PAIN SCALES - GENERAL
PAINLEVEL_OUTOF10: 7
PAINLEVEL_OUTOF10: 5
PAINLEVEL_OUTOF10: 6
PAINLEVEL_OUTOF10: 0
PAINLEVEL_OUTOF10: 4
PAINLEVEL_OUTOF10: 2
PAINLEVEL_OUTOF10: 4
PAINLEVEL_OUTOF10: 3
PAINLEVEL_OUTOF10: 9
PAINLEVEL_OUTOF10: 9
PAINLEVEL_OUTOF10: 0

## 2025-08-30 ASSESSMENT — PAIN DESCRIPTION - LOCATION
LOCATION: NECK

## 2025-08-30 ASSESSMENT — PAIN DESCRIPTION - ONSET
ONSET: ON-GOING

## 2025-08-30 ASSESSMENT — PAIN DESCRIPTION - FREQUENCY
FREQUENCY: CONTINUOUS
FREQUENCY: INTERMITTENT

## 2025-08-30 ASSESSMENT — PAIN DESCRIPTION - DESCRIPTORS
DESCRIPTORS: SHARP;SPASM
DESCRIPTORS: ACHING
DESCRIPTORS: SPASM;SHARP
DESCRIPTORS: ACHING

## 2025-08-30 ASSESSMENT — PAIN DESCRIPTION - PAIN TYPE
TYPE: ACUTE PAIN

## 2025-08-30 ASSESSMENT — PAIN - FUNCTIONAL ASSESSMENT
PAIN_FUNCTIONAL_ASSESSMENT: ACTIVITIES ARE NOT PREVENTED
PAIN_FUNCTIONAL_ASSESSMENT: PREVENTS OR INTERFERES SOME ACTIVE ACTIVITIES AND ADLS
PAIN_FUNCTIONAL_ASSESSMENT: PREVENTS OR INTERFERES SOME ACTIVE ACTIVITIES AND ADLS
PAIN_FUNCTIONAL_ASSESSMENT: 0-10
PAIN_FUNCTIONAL_ASSESSMENT: PREVENTS OR INTERFERES SOME ACTIVE ACTIVITIES AND ADLS

## 2025-08-30 ASSESSMENT — PAIN DESCRIPTION - ORIENTATION
ORIENTATION: POSTERIOR

## 2025-08-31 LAB
ANION GAP SERPL CALC-SCNC: 10 MMOL/L (ref 3–18)
APTT PPP: 113.1 SEC (ref 21.7–34.2)
APTT PPP: 95 SEC (ref 21.7–34.2)
APTT PPP: >180 SEC (ref 21.7–34.2)
BASOPHILS # BLD: 0.04 K/UL (ref 0–0.1)
BASOPHILS NFR BLD: 0.7 % (ref 0–2)
BUN SERPL-MCNC: 18 MG/DL (ref 6–23)
BUN/CREAT SERPL: 19 (ref 12–20)
CALCIUM SERPL-MCNC: 8.6 MG/DL (ref 8.5–10.1)
CHLORIDE SERPL-SCNC: 109 MMOL/L (ref 98–107)
CO2 SERPL-SCNC: 22 MMOL/L (ref 21–32)
CREAT SERPL-MCNC: 0.96 MG/DL (ref 0.6–1.3)
DIFFERENTIAL METHOD BLD: ABNORMAL
EOSINOPHIL # BLD: 0.11 K/UL (ref 0–0.4)
EOSINOPHIL NFR BLD: 2 % (ref 0–5)
ERYTHROCYTE [DISTWIDTH] IN BLOOD BY AUTOMATED COUNT: 16.6 % (ref 11.6–14.5)
GLUCOSE BLD STRIP.AUTO-MCNC: 189 MG/DL (ref 70–110)
GLUCOSE BLD STRIP.AUTO-MCNC: 241 MG/DL (ref 70–110)
GLUCOSE BLD STRIP.AUTO-MCNC: 272 MG/DL (ref 70–110)
GLUCOSE BLD STRIP.AUTO-MCNC: 273 MG/DL (ref 70–110)
GLUCOSE SERPL-MCNC: 166 MG/DL (ref 74–108)
HCT VFR BLD AUTO: 32.3 % (ref 36–48)
HGB BLD-MCNC: 10.3 G/DL (ref 13–16)
IMM GRANULOCYTES # BLD AUTO: 0.1 K/UL (ref 0–0.04)
IMM GRANULOCYTES NFR BLD AUTO: 1.8 % (ref 0–0.5)
INR PPP: 4.1 (ref 0.9–1.2)
LYMPHOCYTES # BLD: 1.31 K/UL (ref 0.9–3.6)
LYMPHOCYTES NFR BLD: 23.8 % (ref 21–52)
MAGNESIUM SERPL-MCNC: 1.9 MG/DL (ref 1.6–2.6)
MCH RBC QN AUTO: 27.5 PG (ref 24–34)
MCHC RBC AUTO-ENTMCNC: 31.9 G/DL (ref 31–37)
MCV RBC AUTO: 86.4 FL (ref 78–100)
MONOCYTES # BLD: 0.63 K/UL (ref 0.05–1.2)
MONOCYTES NFR BLD: 11.5 % (ref 3–10)
NEUTS SEG # BLD: 3.31 K/UL (ref 1.8–8)
NEUTS SEG NFR BLD: 60.2 % (ref 40–73)
NRBC # BLD: 0 K/UL (ref 0–0.01)
NRBC BLD-RTO: 0 PER 100 WBC
PLATELET # BLD AUTO: 148 K/UL (ref 135–420)
PMV BLD AUTO: 10.3 FL (ref 9.2–11.8)
POTASSIUM SERPL-SCNC: 4 MMOL/L (ref 3.5–5.5)
PROTHROMBIN TIME: 40.6 SEC (ref 12–15.1)
RBC # BLD AUTO: 3.74 M/UL (ref 4.35–5.65)
SODIUM SERPL-SCNC: 141 MMOL/L (ref 136–145)
WBC # BLD AUTO: 5.5 K/UL (ref 4.6–13.2)

## 2025-08-31 PROCEDURE — 97166 OT EVAL MOD COMPLEX 45 MIN: CPT

## 2025-08-31 PROCEDURE — 6370000000 HC RX 637 (ALT 250 FOR IP): Performed by: FAMILY MEDICINE

## 2025-08-31 PROCEDURE — 87040 BLOOD CULTURE FOR BACTERIA: CPT

## 2025-08-31 PROCEDURE — 94640 AIRWAY INHALATION TREATMENT: CPT

## 2025-08-31 PROCEDURE — 1100000003 HC PRIVATE W/ TELEMETRY

## 2025-08-31 PROCEDURE — 97162 PT EVAL MOD COMPLEX 30 MIN: CPT

## 2025-08-31 PROCEDURE — 6360000002 HC RX W HCPCS: Performed by: STUDENT IN AN ORGANIZED HEALTH CARE EDUCATION/TRAINING PROGRAM

## 2025-08-31 PROCEDURE — 97530 THERAPEUTIC ACTIVITIES: CPT

## 2025-08-31 PROCEDURE — 2580000003 HC RX 258: Performed by: INTERNAL MEDICINE

## 2025-08-31 PROCEDURE — 83735 ASSAY OF MAGNESIUM: CPT

## 2025-08-31 PROCEDURE — 36415 COLL VENOUS BLD VENIPUNCTURE: CPT

## 2025-08-31 PROCEDURE — 6370000000 HC RX 637 (ALT 250 FOR IP): Performed by: STUDENT IN AN ORGANIZED HEALTH CARE EDUCATION/TRAINING PROGRAM

## 2025-08-31 PROCEDURE — 80048 BASIC METABOLIC PNL TOTAL CA: CPT

## 2025-08-31 PROCEDURE — 99232 SBSQ HOSP IP/OBS MODERATE 35: CPT | Performed by: STUDENT IN AN ORGANIZED HEALTH CARE EDUCATION/TRAINING PROGRAM

## 2025-08-31 PROCEDURE — 85610 PROTHROMBIN TIME: CPT

## 2025-08-31 PROCEDURE — 85730 THROMBOPLASTIN TIME PARTIAL: CPT

## 2025-08-31 PROCEDURE — 85025 COMPLETE CBC W/AUTO DIFF WBC: CPT

## 2025-08-31 PROCEDURE — 94761 N-INVAS EAR/PLS OXIMETRY MLT: CPT

## 2025-08-31 PROCEDURE — 51798 US URINE CAPACITY MEASURE: CPT

## 2025-08-31 PROCEDURE — 6360000002 HC RX W HCPCS: Performed by: INTERNAL MEDICINE

## 2025-08-31 PROCEDURE — 6360000002 HC RX W HCPCS: Performed by: PHYSICIAN ASSISTANT

## 2025-08-31 PROCEDURE — 82962 GLUCOSE BLOOD TEST: CPT

## 2025-08-31 PROCEDURE — 94664 DEMO&/EVAL PT USE INHALER: CPT

## 2025-08-31 RX ORDER — INSULIN GLARGINE 100 [IU]/ML
16 INJECTION, SOLUTION SUBCUTANEOUS NIGHTLY
Status: DISCONTINUED | OUTPATIENT
Start: 2025-08-31 | End: 2025-09-06 | Stop reason: HOSPADM

## 2025-08-31 RX ORDER — DIGOXIN 125 MCG
125 TABLET ORAL DAILY
Status: DISCONTINUED | OUTPATIENT
Start: 2025-08-31 | End: 2025-09-02

## 2025-08-31 RX ADMIN — HYDROCORTISONE: 1 CREAM TOPICAL at 18:31

## 2025-08-31 RX ADMIN — NAFCILLIN SODIUM 2000 MG: 2 INJECTION, POWDER, LYOPHILIZED, FOR SOLUTION INTRAMUSCULAR; INTRAVENOUS at 18:24

## 2025-08-31 RX ADMIN — PANTOPRAZOLE SODIUM 40 MG: 40 TABLET, DELAYED RELEASE ORAL at 06:27

## 2025-08-31 RX ADMIN — INSULIN LISPRO 4 UNITS: 100 INJECTION, SOLUTION INTRAVENOUS; SUBCUTANEOUS at 18:31

## 2025-08-31 RX ADMIN — HYDROCORTISONE: 1 CREAM TOPICAL at 22:32

## 2025-08-31 RX ADMIN — ARFORMOTEROL TARTRATE 15 MCG: 15 SOLUTION RESPIRATORY (INHALATION) at 19:35

## 2025-08-31 RX ADMIN — INSULIN LISPRO 2 UNITS: 100 INJECTION, SOLUTION INTRAVENOUS; SUBCUTANEOUS at 20:30

## 2025-08-31 RX ADMIN — NAFCILLIN SODIUM 2000 MG: 2 INJECTION, POWDER, LYOPHILIZED, FOR SOLUTION INTRAMUSCULAR; INTRAVENOUS at 22:30

## 2025-08-31 RX ADMIN — INSULIN LISPRO 4 UNITS: 100 INJECTION, SOLUTION INTRAVENOUS; SUBCUTANEOUS at 12:47

## 2025-08-31 RX ADMIN — NAFCILLIN SODIUM 2000 MG: 2 INJECTION, POWDER, LYOPHILIZED, FOR SOLUTION INTRAMUSCULAR; INTRAVENOUS at 02:37

## 2025-08-31 RX ADMIN — ROSUVASTATIN CALCIUM 20 MG: 20 TABLET, FILM COATED ORAL at 20:32

## 2025-08-31 RX ADMIN — OXYCODONE HYDROCHLORIDE 5 MG: 5 TABLET ORAL at 06:40

## 2025-08-31 RX ADMIN — BUDESONIDE 1 MG: 1 SUSPENSION RESPIRATORY (INHALATION) at 07:49

## 2025-08-31 RX ADMIN — HYDROCORTISONE: 1 CREAM TOPICAL at 12:49

## 2025-08-31 RX ADMIN — NAFCILLIN SODIUM 2000 MG: 2 INJECTION, POWDER, LYOPHILIZED, FOR SOLUTION INTRAMUSCULAR; INTRAVENOUS at 14:30

## 2025-08-31 RX ADMIN — BUDESONIDE 1 MG: 1 SUSPENSION RESPIRATORY (INHALATION) at 19:35

## 2025-08-31 RX ADMIN — NAFCILLIN SODIUM 2000 MG: 2 INJECTION, POWDER, LYOPHILIZED, FOR SOLUTION INTRAMUSCULAR; INTRAVENOUS at 11:02

## 2025-08-31 RX ADMIN — METOPROLOL TARTRATE 25 MG: 25 TABLET, FILM COATED ORAL at 20:32

## 2025-08-31 RX ADMIN — LEVOTHYROXINE SODIUM 125 MCG: 125 TABLET ORAL at 06:27

## 2025-08-31 RX ADMIN — INSULIN GLARGINE 16 UNITS: 100 INJECTION, SOLUTION SUBCUTANEOUS at 20:31

## 2025-08-31 RX ADMIN — OXYCODONE HYDROCHLORIDE 5 MG: 5 TABLET ORAL at 12:47

## 2025-08-31 RX ADMIN — OXYCODONE HYDROCHLORIDE 5 MG: 5 TABLET ORAL at 19:00

## 2025-08-31 RX ADMIN — NAFCILLIN SODIUM 2000 MG: 2 INJECTION, POWDER, LYOPHILIZED, FOR SOLUTION INTRAMUSCULAR; INTRAVENOUS at 06:26

## 2025-08-31 RX ADMIN — METOPROLOL TARTRATE 25 MG: 25 TABLET, FILM COATED ORAL at 10:48

## 2025-08-31 RX ADMIN — HYDROCORTISONE: 1 CREAM TOPICAL at 10:30

## 2025-08-31 RX ADMIN — DIGOXIN 250 MCG: 0.25 INJECTION INTRAMUSCULAR; INTRAVENOUS at 00:46

## 2025-08-31 RX ADMIN — DIGOXIN 125 MCG: 125 TABLET ORAL at 12:47

## 2025-08-31 RX ADMIN — ARFORMOTEROL TARTRATE 15 MCG: 15 SOLUTION RESPIRATORY (INHALATION) at 07:49

## 2025-08-31 ASSESSMENT — PAIN - FUNCTIONAL ASSESSMENT
PAIN_FUNCTIONAL_ASSESSMENT: 0-10
PAIN_FUNCTIONAL_ASSESSMENT: PREVENTS OR INTERFERES SOME ACTIVE ACTIVITIES AND ADLS
PAIN_FUNCTIONAL_ASSESSMENT: PREVENTS OR INTERFERES SOME ACTIVE ACTIVITIES AND ADLS
PAIN_FUNCTIONAL_ASSESSMENT: ACTIVITIES ARE NOT PREVENTED

## 2025-08-31 ASSESSMENT — PAIN SCALES - GENERAL
PAINLEVEL_OUTOF10: 9
PAINLEVEL_OUTOF10: 0
PAINLEVEL_OUTOF10: 8
PAINLEVEL_OUTOF10: 6

## 2025-08-31 ASSESSMENT — PAIN DESCRIPTION - LOCATION
LOCATION: BACK;NECK
LOCATION: NECK
LOCATION: NECK;SHOULDER

## 2025-08-31 ASSESSMENT — PAIN DESCRIPTION - ORIENTATION
ORIENTATION: POSTERIOR
ORIENTATION: POSTERIOR
ORIENTATION: RIGHT

## 2025-08-31 ASSESSMENT — PAIN DESCRIPTION - DESCRIPTORS
DESCRIPTORS: SPASM;SHOOTING
DESCRIPTORS: ACHING;THROBBING
DESCRIPTORS: SPASM;SHARP

## 2025-09-01 LAB
ANION GAP SERPL CALC-SCNC: 10 MMOL/L (ref 3–18)
APTT PPP: 60.8 SEC (ref 21.7–34.2)
APTT PPP: 64.6 SEC (ref 21.7–34.2)
APTT PPP: 85.2 SEC (ref 21.7–34.2)
BASOPHILS # BLD: 0.01 K/UL (ref 0–0.1)
BASOPHILS NFR BLD: 0.2 % (ref 0–2)
BUN SERPL-MCNC: 15 MG/DL (ref 6–23)
BUN/CREAT SERPL: 17 (ref 12–20)
CALCIUM SERPL-MCNC: 8.5 MG/DL (ref 8.5–10.1)
CHLORIDE SERPL-SCNC: 108 MMOL/L (ref 98–107)
CO2 SERPL-SCNC: 21 MMOL/L (ref 21–32)
CREAT SERPL-MCNC: 0.88 MG/DL (ref 0.6–1.3)
DIFFERENTIAL METHOD BLD: ABNORMAL
EOSINOPHIL # BLD: 0.16 K/UL (ref 0–0.4)
EOSINOPHIL NFR BLD: 2.7 % (ref 0–5)
ERYTHROCYTE [DISTWIDTH] IN BLOOD BY AUTOMATED COUNT: 16.6 % (ref 11.6–14.5)
GLUCOSE BLD STRIP.AUTO-MCNC: 214 MG/DL (ref 70–110)
GLUCOSE BLD STRIP.AUTO-MCNC: 219 MG/DL (ref 70–110)
GLUCOSE BLD STRIP.AUTO-MCNC: 222 MG/DL (ref 70–110)
GLUCOSE BLD STRIP.AUTO-MCNC: 304 MG/DL (ref 70–110)
GLUCOSE SERPL-MCNC: 155 MG/DL (ref 74–108)
HCT VFR BLD AUTO: 30.8 % (ref 36–48)
HGB BLD-MCNC: 10.1 G/DL (ref 13–16)
IMM GRANULOCYTES # BLD AUTO: 0.2 K/UL (ref 0–0.04)
IMM GRANULOCYTES NFR BLD AUTO: 3.4 % (ref 0–0.5)
INR PPP: 2.3 (ref 0.9–1.2)
LYMPHOCYTES # BLD: 1.28 K/UL (ref 0.9–3.6)
LYMPHOCYTES NFR BLD: 21.5 % (ref 21–52)
MAGNESIUM SERPL-MCNC: 1.7 MG/DL (ref 1.6–2.6)
MCH RBC QN AUTO: 28.6 PG (ref 24–34)
MCHC RBC AUTO-ENTMCNC: 32.8 G/DL (ref 31–37)
MCV RBC AUTO: 87.3 FL (ref 78–100)
MONOCYTES # BLD: 0.83 K/UL (ref 0.05–1.2)
MONOCYTES NFR BLD: 14 % (ref 3–10)
NEUTS SEG # BLD: 3.46 K/UL (ref 1.8–8)
NEUTS SEG NFR BLD: 58.2 % (ref 40–73)
NRBC # BLD: 0.03 K/UL (ref 0–0.01)
NRBC BLD-RTO: 0.5 PER 100 WBC
PLATELET # BLD AUTO: 163 K/UL (ref 135–420)
PMV BLD AUTO: 10 FL (ref 9.2–11.8)
POTASSIUM SERPL-SCNC: 3.8 MMOL/L (ref 3.5–5.5)
PROTHROMBIN TIME: 26.1 SEC (ref 12–15.1)
RBC # BLD AUTO: 3.53 M/UL (ref 4.35–5.65)
SODIUM SERPL-SCNC: 139 MMOL/L (ref 136–145)
WBC # BLD AUTO: 5.9 K/UL (ref 4.6–13.2)

## 2025-09-01 PROCEDURE — 6360000002 HC RX W HCPCS: Performed by: PHYSICIAN ASSISTANT

## 2025-09-01 PROCEDURE — 1100000003 HC PRIVATE W/ TELEMETRY

## 2025-09-01 PROCEDURE — 6370000000 HC RX 637 (ALT 250 FOR IP): Performed by: INTERNAL MEDICINE

## 2025-09-01 PROCEDURE — 85730 THROMBOPLASTIN TIME PARTIAL: CPT

## 2025-09-01 PROCEDURE — 6370000000 HC RX 637 (ALT 250 FOR IP): Performed by: STUDENT IN AN ORGANIZED HEALTH CARE EDUCATION/TRAINING PROGRAM

## 2025-09-01 PROCEDURE — 85610 PROTHROMBIN TIME: CPT

## 2025-09-01 PROCEDURE — 6360000002 HC RX W HCPCS: Performed by: INTERNAL MEDICINE

## 2025-09-01 PROCEDURE — 6360000002 HC RX W HCPCS: Performed by: STUDENT IN AN ORGANIZED HEALTH CARE EDUCATION/TRAINING PROGRAM

## 2025-09-01 PROCEDURE — 6370000000 HC RX 637 (ALT 250 FOR IP): Performed by: FAMILY MEDICINE

## 2025-09-01 PROCEDURE — 94640 AIRWAY INHALATION TREATMENT: CPT

## 2025-09-01 PROCEDURE — 36415 COLL VENOUS BLD VENIPUNCTURE: CPT

## 2025-09-01 PROCEDURE — 83735 ASSAY OF MAGNESIUM: CPT

## 2025-09-01 PROCEDURE — 82962 GLUCOSE BLOOD TEST: CPT

## 2025-09-01 PROCEDURE — 85025 COMPLETE CBC W/AUTO DIFF WBC: CPT

## 2025-09-01 PROCEDURE — 94761 N-INVAS EAR/PLS OXIMETRY MLT: CPT

## 2025-09-01 PROCEDURE — 80048 BASIC METABOLIC PNL TOTAL CA: CPT

## 2025-09-01 PROCEDURE — 2580000003 HC RX 258: Performed by: INTERNAL MEDICINE

## 2025-09-01 PROCEDURE — 99233 SBSQ HOSP IP/OBS HIGH 50: CPT | Performed by: STUDENT IN AN ORGANIZED HEALTH CARE EDUCATION/TRAINING PROGRAM

## 2025-09-01 RX ORDER — ONDANSETRON 4 MG/1
4 TABLET, FILM COATED ORAL ONCE
Status: COMPLETED | OUTPATIENT
Start: 2025-09-01 | End: 2025-09-01

## 2025-09-01 RX ADMIN — OXYCODONE HYDROCHLORIDE 5 MG: 5 TABLET ORAL at 02:32

## 2025-09-01 RX ADMIN — HYDROCORTISONE: 1 CREAM TOPICAL at 20:38

## 2025-09-01 RX ADMIN — DIGOXIN 125 MCG: 125 TABLET ORAL at 08:30

## 2025-09-01 RX ADMIN — NAFCILLIN SODIUM 2000 MG: 2 INJECTION, POWDER, LYOPHILIZED, FOR SOLUTION INTRAMUSCULAR; INTRAVENOUS at 06:29

## 2025-09-01 RX ADMIN — NAFCILLIN SODIUM 2000 MG: 2 INJECTION, POWDER, LYOPHILIZED, FOR SOLUTION INTRAMUSCULAR; INTRAVENOUS at 22:05

## 2025-09-01 RX ADMIN — ONDANSETRON HYDROCHLORIDE 4 MG: 4 TABLET, FILM COATED ORAL at 08:30

## 2025-09-01 RX ADMIN — INSULIN LISPRO 2 UNITS: 100 INJECTION, SOLUTION INTRAVENOUS; SUBCUTANEOUS at 11:58

## 2025-09-01 RX ADMIN — HEPARIN SODIUM 2000 UNITS: 1000 INJECTION, SOLUTION INTRAVENOUS; SUBCUTANEOUS at 05:15

## 2025-09-01 RX ADMIN — METOPROLOL TARTRATE 25 MG: 25 TABLET, FILM COATED ORAL at 08:30

## 2025-09-01 RX ADMIN — PANTOPRAZOLE SODIUM 40 MG: 40 TABLET, DELAYED RELEASE ORAL at 06:30

## 2025-09-01 RX ADMIN — LEVOTHYROXINE SODIUM 125 MCG: 125 TABLET ORAL at 06:30

## 2025-09-01 RX ADMIN — HEPARIN SODIUM 2000 UNITS: 1000 INJECTION, SOLUTION INTRAVENOUS; SUBCUTANEOUS at 13:12

## 2025-09-01 RX ADMIN — INSULIN LISPRO 6 UNITS: 100 INJECTION, SOLUTION INTRAVENOUS; SUBCUTANEOUS at 20:34

## 2025-09-01 RX ADMIN — BUDESONIDE 1 MG: 1 SUSPENSION RESPIRATORY (INHALATION) at 20:37

## 2025-09-01 RX ADMIN — OXYCODONE HYDROCHLORIDE 5 MG: 5 TABLET ORAL at 08:41

## 2025-09-01 RX ADMIN — INSULIN LISPRO 2 UNITS: 100 INJECTION, SOLUTION INTRAVENOUS; SUBCUTANEOUS at 16:43

## 2025-09-01 RX ADMIN — HEPARIN SODIUM AND DEXTROSE 6 UNITS/KG/HR: 10000; 5 INJECTION INTRAVENOUS at 07:21

## 2025-09-01 RX ADMIN — INSULIN LISPRO 2 UNITS: 100 INJECTION, SOLUTION INTRAVENOUS; SUBCUTANEOUS at 08:42

## 2025-09-01 RX ADMIN — ACETAMINOPHEN 650 MG: 325 TABLET ORAL at 20:35

## 2025-09-01 RX ADMIN — INSULIN GLARGINE 16 UNITS: 100 INJECTION, SOLUTION SUBCUTANEOUS at 20:34

## 2025-09-01 RX ADMIN — BUDESONIDE 1 MG: 1 SUSPENSION RESPIRATORY (INHALATION) at 08:22

## 2025-09-01 RX ADMIN — HYDROCORTISONE: 1 CREAM TOPICAL at 08:38

## 2025-09-01 RX ADMIN — ARFORMOTEROL TARTRATE 15 MCG: 15 SOLUTION RESPIRATORY (INHALATION) at 20:37

## 2025-09-01 RX ADMIN — HYDROCORTISONE: 1 CREAM TOPICAL at 12:00

## 2025-09-01 RX ADMIN — HYDROCORTISONE: 1 CREAM TOPICAL at 16:38

## 2025-09-01 RX ADMIN — NAFCILLIN SODIUM 2000 MG: 2 INJECTION, POWDER, LYOPHILIZED, FOR SOLUTION INTRAMUSCULAR; INTRAVENOUS at 09:29

## 2025-09-01 RX ADMIN — OXYCODONE HYDROCHLORIDE 5 MG: 5 TABLET ORAL at 23:47

## 2025-09-01 RX ADMIN — METOPROLOL TARTRATE 25 MG: 25 TABLET, FILM COATED ORAL at 20:34

## 2025-09-01 RX ADMIN — OXYCODONE HYDROCHLORIDE 5 MG: 5 TABLET ORAL at 17:42

## 2025-09-01 RX ADMIN — NAFCILLIN SODIUM 2000 MG: 2 INJECTION, POWDER, LYOPHILIZED, FOR SOLUTION INTRAMUSCULAR; INTRAVENOUS at 13:47

## 2025-09-01 RX ADMIN — ARFORMOTEROL TARTRATE 15 MCG: 15 SOLUTION RESPIRATORY (INHALATION) at 08:22

## 2025-09-01 RX ADMIN — ROSUVASTATIN CALCIUM 20 MG: 20 TABLET, FILM COATED ORAL at 20:34

## 2025-09-01 RX ADMIN — POLYETHYLENE GLYCOL 3350 17 G: 17 POWDER, FOR SOLUTION ORAL at 06:49

## 2025-09-01 RX ADMIN — NAFCILLIN SODIUM 2000 MG: 2 INJECTION, POWDER, LYOPHILIZED, FOR SOLUTION INTRAMUSCULAR; INTRAVENOUS at 17:38

## 2025-09-01 RX ADMIN — NAFCILLIN SODIUM 2000 MG: 2 INJECTION, POWDER, LYOPHILIZED, FOR SOLUTION INTRAMUSCULAR; INTRAVENOUS at 02:31

## 2025-09-01 ASSESSMENT — PAIN - FUNCTIONAL ASSESSMENT
PAIN_FUNCTIONAL_ASSESSMENT: 0-10
PAIN_FUNCTIONAL_ASSESSMENT: 0-10
PAIN_FUNCTIONAL_ASSESSMENT: PREVENTS OR INTERFERES SOME ACTIVE ACTIVITIES AND ADLS
PAIN_FUNCTIONAL_ASSESSMENT: 0-10
PAIN_FUNCTIONAL_ASSESSMENT: PREVENTS OR INTERFERES SOME ACTIVE ACTIVITIES AND ADLS
PAIN_FUNCTIONAL_ASSESSMENT: ACTIVITIES ARE NOT PREVENTED
PAIN_FUNCTIONAL_ASSESSMENT: 0-10

## 2025-09-01 ASSESSMENT — PAIN SCALES - GENERAL
PAINLEVEL_OUTOF10: 0
PAINLEVEL_OUTOF10: 0
PAINLEVEL_OUTOF10: 7
PAINLEVEL_OUTOF10: 6
PAINLEVEL_OUTOF10: 0
PAINLEVEL_OUTOF10: 7
PAINLEVEL_OUTOF10: 6
PAINLEVEL_OUTOF10: 0
PAINLEVEL_OUTOF10: 2
PAINLEVEL_OUTOF10: 7
PAINLEVEL_OUTOF10: 6

## 2025-09-01 ASSESSMENT — PAIN DESCRIPTION - ORIENTATION
ORIENTATION: RIGHT;ANTERIOR;OTHER (COMMENT)
ORIENTATION: RIGHT;OTHER (COMMENT)
ORIENTATION: RIGHT;POSTERIOR

## 2025-09-01 ASSESSMENT — PAIN DESCRIPTION - PAIN TYPE
TYPE: CHRONIC PAIN
TYPE: ACUTE PAIN

## 2025-09-01 ASSESSMENT — PAIN DESCRIPTION - LOCATION
LOCATION: NECK
LOCATION: BACK;NECK
LOCATION: NECK

## 2025-09-01 ASSESSMENT — PAIN DESCRIPTION - ONSET
ONSET: GRADUAL

## 2025-09-01 ASSESSMENT — PAIN DESCRIPTION - FREQUENCY
FREQUENCY: INTERMITTENT

## 2025-09-01 ASSESSMENT — PAIN DESCRIPTION - DESCRIPTORS
DESCRIPTORS: THROBBING;DISCOMFORT
DESCRIPTORS: ACHING

## 2025-09-02 ENCOUNTER — APPOINTMENT (OUTPATIENT)
Facility: HOSPITAL | Age: 80
End: 2025-09-02
Payer: MEDICARE

## 2025-09-02 LAB
ANION GAP SERPL CALC-SCNC: 10 MMOL/L (ref 3–18)
APTT PPP: 59.8 SEC (ref 21.7–34.2)
APTT PPP: 95.3 SEC (ref 21.7–34.2)
BASOPHILS # BLD: 0 K/UL (ref 0–0.1)
BASOPHILS NFR BLD: 0 % (ref 0–2)
BUN SERPL-MCNC: 15 MG/DL (ref 6–23)
BUN/CREAT SERPL: 15 (ref 12–20)
CALCIUM SERPL-MCNC: 8.9 MG/DL (ref 8.5–10.1)
CHLORIDE SERPL-SCNC: 107 MMOL/L (ref 98–107)
CO2 SERPL-SCNC: 24 MMOL/L (ref 21–32)
CREAT SERPL-MCNC: 0.99 MG/DL (ref 0.6–1.3)
DIFFERENTIAL METHOD BLD: ABNORMAL
EOSINOPHIL # BLD: 0.3 K/UL (ref 0–0.4)
EOSINOPHIL NFR BLD: 4 % (ref 0–5)
ERYTHROCYTE [DISTWIDTH] IN BLOOD BY AUTOMATED COUNT: 16.6 % (ref 11.6–14.5)
GLUCOSE BLD STRIP.AUTO-MCNC: 156 MG/DL (ref 70–110)
GLUCOSE BLD STRIP.AUTO-MCNC: 160 MG/DL (ref 70–110)
GLUCOSE BLD STRIP.AUTO-MCNC: 160 MG/DL (ref 70–110)
GLUCOSE BLD STRIP.AUTO-MCNC: 191 MG/DL (ref 70–110)
GLUCOSE SERPL-MCNC: 138 MG/DL (ref 74–108)
HCT VFR BLD AUTO: 31.9 % (ref 36–48)
HGB BLD-MCNC: 10.2 G/DL (ref 13–16)
IMM GRANULOCYTES # BLD AUTO: 0 K/UL (ref 0–0.04)
IMM GRANULOCYTES NFR BLD AUTO: 0 % (ref 0–0.5)
INR PPP: 1.5 (ref 0.9–1.2)
LYMPHOCYTES # BLD: 1.63 K/UL (ref 0.9–3.6)
LYMPHOCYTES NFR BLD: 22 % (ref 21–52)
MAGNESIUM SERPL-MCNC: 1.7 MG/DL (ref 1.6–2.6)
MCH RBC QN AUTO: 28 PG (ref 24–34)
MCHC RBC AUTO-ENTMCNC: 32 G/DL (ref 31–37)
MCV RBC AUTO: 87.6 FL (ref 78–100)
MONOCYTES # BLD: 0.67 K/UL (ref 0.05–1.2)
MONOCYTES NFR BLD: 9 % (ref 3–10)
NEUTS BAND NFR BLD MANUAL: 1 %
NEUTS SEG # BLD: 4.8 K/UL (ref 1.8–8)
NEUTS SEG NFR BLD: 64 % (ref 40–73)
NRBC # BLD: 0.08 K/UL (ref 0–0.01)
NRBC BLD-RTO: 1.1 PER 100 WBC
PLATELET # BLD AUTO: 258 K/UL (ref 135–420)
PLATELET COMMENT: ABNORMAL
PMV BLD AUTO: 10.2 FL (ref 9.2–11.8)
POTASSIUM SERPL-SCNC: 4.1 MMOL/L (ref 3.5–5.5)
PROTHROMBIN TIME: 18.5 SEC (ref 12–15.1)
RBC # BLD AUTO: 3.64 M/UL (ref 4.35–5.65)
RBC MORPH BLD: ABNORMAL
SODIUM SERPL-SCNC: 141 MMOL/L (ref 136–145)
WBC # BLD AUTO: 7.4 K/UL (ref 4.6–13.2)

## 2025-09-02 PROCEDURE — 80048 BASIC METABOLIC PNL TOTAL CA: CPT

## 2025-09-02 PROCEDURE — 71045 X-RAY EXAM CHEST 1 VIEW: CPT

## 2025-09-02 PROCEDURE — 94761 N-INVAS EAR/PLS OXIMETRY MLT: CPT

## 2025-09-02 PROCEDURE — 85025 COMPLETE CBC W/AUTO DIFF WBC: CPT

## 2025-09-02 PROCEDURE — 6360000002 HC RX W HCPCS: Performed by: PHYSICIAN ASSISTANT

## 2025-09-02 PROCEDURE — 1100000003 HC PRIVATE W/ TELEMETRY

## 2025-09-02 PROCEDURE — 82962 GLUCOSE BLOOD TEST: CPT

## 2025-09-02 PROCEDURE — 6360000002 HC RX W HCPCS: Performed by: INTERNAL MEDICINE

## 2025-09-02 PROCEDURE — 6360000002 HC RX W HCPCS: Performed by: STUDENT IN AN ORGANIZED HEALTH CARE EDUCATION/TRAINING PROGRAM

## 2025-09-02 PROCEDURE — 6370000000 HC RX 637 (ALT 250 FOR IP): Performed by: STUDENT IN AN ORGANIZED HEALTH CARE EDUCATION/TRAINING PROGRAM

## 2025-09-02 PROCEDURE — 6370000000 HC RX 637 (ALT 250 FOR IP)

## 2025-09-02 PROCEDURE — 36415 COLL VENOUS BLD VENIPUNCTURE: CPT

## 2025-09-02 PROCEDURE — 6370000000 HC RX 637 (ALT 250 FOR IP): Performed by: FAMILY MEDICINE

## 2025-09-02 PROCEDURE — 85730 THROMBOPLASTIN TIME PARTIAL: CPT

## 2025-09-02 PROCEDURE — 85610 PROTHROMBIN TIME: CPT

## 2025-09-02 PROCEDURE — 83735 ASSAY OF MAGNESIUM: CPT

## 2025-09-02 PROCEDURE — 99232 SBSQ HOSP IP/OBS MODERATE 35: CPT | Performed by: STUDENT IN AN ORGANIZED HEALTH CARE EDUCATION/TRAINING PROGRAM

## 2025-09-02 PROCEDURE — 94640 AIRWAY INHALATION TREATMENT: CPT

## 2025-09-02 PROCEDURE — 2580000003 HC RX 258: Performed by: INTERNAL MEDICINE

## 2025-09-02 RX ORDER — METOPROLOL SUCCINATE 50 MG/1
50 TABLET, EXTENDED RELEASE ORAL DAILY
Status: DISCONTINUED | OUTPATIENT
Start: 2025-09-03 | End: 2025-09-04

## 2025-09-02 RX ORDER — WARFARIN SODIUM 5 MG/1
5 TABLET ORAL
Status: COMPLETED | OUTPATIENT
Start: 2025-09-02 | End: 2025-09-02

## 2025-09-02 RX ADMIN — INSULIN LISPRO 2 UNITS: 100 INJECTION, SOLUTION INTRAVENOUS; SUBCUTANEOUS at 11:28

## 2025-09-02 RX ADMIN — WARFARIN SODIUM 5 MG: 5 TABLET ORAL at 18:20

## 2025-09-02 RX ADMIN — BUDESONIDE 1 MG: 1 SUSPENSION RESPIRATORY (INHALATION) at 20:08

## 2025-09-02 RX ADMIN — HYDROCORTISONE: 1 CREAM TOPICAL at 08:51

## 2025-09-02 RX ADMIN — BUDESONIDE 1 MG: 1 SUSPENSION RESPIRATORY (INHALATION) at 08:58

## 2025-09-02 RX ADMIN — LEVOTHYROXINE SODIUM 125 MCG: 125 TABLET ORAL at 05:43

## 2025-09-02 RX ADMIN — NAFCILLIN SODIUM 2000 MG: 2 INJECTION, POWDER, LYOPHILIZED, FOR SOLUTION INTRAMUSCULAR; INTRAVENOUS at 22:13

## 2025-09-02 RX ADMIN — METOPROLOL TARTRATE 25 MG: 25 TABLET, FILM COATED ORAL at 08:50

## 2025-09-02 RX ADMIN — NAFCILLIN SODIUM 2000 MG: 2 INJECTION, POWDER, LYOPHILIZED, FOR SOLUTION INTRAMUSCULAR; INTRAVENOUS at 01:30

## 2025-09-02 RX ADMIN — HYDROCORTISONE: 1 CREAM TOPICAL at 20:35

## 2025-09-02 RX ADMIN — HEPARIN SODIUM AND DEXTROSE 10 UNITS/KG/HR: 10000; 5 INJECTION INTRAVENOUS at 17:00

## 2025-09-02 RX ADMIN — NAFCILLIN SODIUM 2000 MG: 2 INJECTION, POWDER, LYOPHILIZED, FOR SOLUTION INTRAMUSCULAR; INTRAVENOUS at 05:40

## 2025-09-02 RX ADMIN — METOPROLOL TARTRATE 25 MG: 25 TABLET, FILM COATED ORAL at 20:31

## 2025-09-02 RX ADMIN — NAFCILLIN SODIUM 2000 MG: 2 INJECTION, POWDER, LYOPHILIZED, FOR SOLUTION INTRAMUSCULAR; INTRAVENOUS at 14:19

## 2025-09-02 RX ADMIN — HYDROCORTISONE: 1 CREAM TOPICAL at 18:20

## 2025-09-02 RX ADMIN — PANTOPRAZOLE SODIUM 40 MG: 40 TABLET, DELAYED RELEASE ORAL at 05:43

## 2025-09-02 RX ADMIN — ROSUVASTATIN CALCIUM 20 MG: 20 TABLET, FILM COATED ORAL at 20:31

## 2025-09-02 RX ADMIN — DIGOXIN 125 MCG: 125 TABLET ORAL at 08:48

## 2025-09-02 RX ADMIN — INSULIN GLARGINE 16 UNITS: 100 INJECTION, SOLUTION SUBCUTANEOUS at 20:31

## 2025-09-02 RX ADMIN — NAFCILLIN SODIUM 2000 MG: 2 INJECTION, POWDER, LYOPHILIZED, FOR SOLUTION INTRAMUSCULAR; INTRAVENOUS at 18:21

## 2025-09-02 RX ADMIN — HYDROCORTISONE: 1 CREAM TOPICAL at 12:38

## 2025-09-02 RX ADMIN — ARFORMOTEROL TARTRATE 15 MCG: 15 SOLUTION RESPIRATORY (INHALATION) at 20:08

## 2025-09-02 RX ADMIN — ARFORMOTEROL TARTRATE 15 MCG: 15 SOLUTION RESPIRATORY (INHALATION) at 08:58

## 2025-09-02 RX ADMIN — POLYETHYLENE GLYCOL 3350 17 G: 17 POWDER, FOR SOLUTION ORAL at 09:10

## 2025-09-02 RX ADMIN — HEPARIN SODIUM 2000 UNITS: 1000 INJECTION, SOLUTION INTRAVENOUS; SUBCUTANEOUS at 09:19

## 2025-09-02 RX ADMIN — OXYCODONE HYDROCHLORIDE 5 MG: 5 TABLET ORAL at 20:30

## 2025-09-02 RX ADMIN — ACETAMINOPHEN 650 MG: 325 TABLET ORAL at 18:15

## 2025-09-02 RX ADMIN — NAFCILLIN SODIUM 2000 MG: 2 INJECTION, POWDER, LYOPHILIZED, FOR SOLUTION INTRAMUSCULAR; INTRAVENOUS at 09:13

## 2025-09-02 ASSESSMENT — PAIN SCALES - GENERAL
PAINLEVEL_OUTOF10: 5
PAINLEVEL_OUTOF10: 1
PAINLEVEL_OUTOF10: 3
PAINLEVEL_OUTOF10: 6
PAINLEVEL_OUTOF10: 1
PAINLEVEL_OUTOF10: 0
PAINLEVEL_OUTOF10: 0
PAINLEVEL_OUTOF10: 7

## 2025-09-02 ASSESSMENT — PAIN DESCRIPTION - ONSET: ONSET: GRADUAL

## 2025-09-02 ASSESSMENT — PAIN - FUNCTIONAL ASSESSMENT
PAIN_FUNCTIONAL_ASSESSMENT: PREVENTS OR INTERFERES SOME ACTIVE ACTIVITIES AND ADLS
PAIN_FUNCTIONAL_ASSESSMENT: PREVENTS OR INTERFERES SOME ACTIVE ACTIVITIES AND ADLS
PAIN_FUNCTIONAL_ASSESSMENT: 0-10

## 2025-09-02 ASSESSMENT — PAIN DESCRIPTION - PAIN TYPE: TYPE: ACUTE PAIN

## 2025-09-02 ASSESSMENT — PAIN DESCRIPTION - LOCATION
LOCATION: NECK
LOCATION: NECK

## 2025-09-02 ASSESSMENT — PAIN DESCRIPTION - DESCRIPTORS
DESCRIPTORS: ACHING
DESCRIPTORS: ACHING

## 2025-09-02 ASSESSMENT — PAIN DESCRIPTION - FREQUENCY: FREQUENCY: INTERMITTENT

## 2025-09-02 ASSESSMENT — PAIN DESCRIPTION - ORIENTATION: ORIENTATION: RIGHT

## 2025-09-03 LAB
ANION GAP SERPL CALC-SCNC: 10 MMOL/L (ref 3–18)
APTT PPP: 93.3 SEC (ref 21.7–34.2)
BACTERIA SPEC CULT: NORMAL
BACTERIA SPEC CULT: NORMAL
BASOPHILS # BLD: 0 K/UL (ref 0–0.1)
BASOPHILS NFR BLD: 0 % (ref 0–2)
BUN SERPL-MCNC: 12 MG/DL (ref 6–23)
BUN/CREAT SERPL: 14 (ref 12–20)
CALCIUM SERPL-MCNC: 8.7 MG/DL (ref 8.5–10.1)
CHLORIDE SERPL-SCNC: 106 MMOL/L (ref 98–107)
CO2 SERPL-SCNC: 23 MMOL/L (ref 21–32)
CREAT SERPL-MCNC: 0.85 MG/DL (ref 0.6–1.3)
DIFFERENTIAL METHOD BLD: ABNORMAL
EOSINOPHIL # BLD: 0.08 K/UL (ref 0–0.4)
EOSINOPHIL NFR BLD: 1 % (ref 0–5)
ERYTHROCYTE [DISTWIDTH] IN BLOOD BY AUTOMATED COUNT: 16.8 % (ref 11.6–14.5)
GLUCOSE BLD STRIP.AUTO-MCNC: 138 MG/DL (ref 70–110)
GLUCOSE BLD STRIP.AUTO-MCNC: 148 MG/DL (ref 70–110)
GLUCOSE BLD STRIP.AUTO-MCNC: 165 MG/DL (ref 70–110)
GLUCOSE BLD STRIP.AUTO-MCNC: 198 MG/DL (ref 70–110)
GLUCOSE SERPL-MCNC: 123 MG/DL (ref 74–108)
HCT VFR BLD AUTO: 32 % (ref 36–48)
HGB BLD-MCNC: 10.1 G/DL (ref 13–16)
IMM GRANULOCYTES # BLD AUTO: 0 K/UL (ref 0–0.04)
IMM GRANULOCYTES NFR BLD AUTO: 0 % (ref 0–0.5)
INR PPP: 1.4 (ref 0.9–1.2)
LYMPHOCYTES # BLD: 1.41 K/UL (ref 0.9–3.6)
LYMPHOCYTES NFR BLD: 17 % (ref 21–52)
MAGNESIUM SERPL-MCNC: 1.9 MG/DL (ref 1.6–2.6)
MCH RBC QN AUTO: 27.7 PG (ref 24–34)
MCHC RBC AUTO-ENTMCNC: 31.6 G/DL (ref 31–37)
MCV RBC AUTO: 87.7 FL (ref 78–100)
MONOCYTES # BLD: 1.16 K/UL (ref 0.05–1.2)
MONOCYTES NFR BLD: 14 % (ref 3–10)
MYELOCYTES NFR BLD MANUAL: 1 %
NEUTS BAND NFR BLD MANUAL: 1 %
NEUTS SEG # BLD: 5.56 K/UL (ref 1.8–8)
NEUTS SEG NFR BLD: 66 % (ref 40–73)
NRBC # BLD: 0.13 K/UL (ref 0–0.01)
NRBC BLD-RTO: 1.6 PER 100 WBC
PLATELET # BLD AUTO: 316 K/UL (ref 135–420)
PLATELET COMMENT: ABNORMAL
PMV BLD AUTO: 9.7 FL (ref 9.2–11.8)
POTASSIUM SERPL-SCNC: 3.5 MMOL/L (ref 3.5–5.5)
PROTHROMBIN TIME: 17.8 SEC (ref 12–15.1)
RBC # BLD AUTO: 3.65 M/UL (ref 4.35–5.65)
RBC MORPH BLD: ABNORMAL
RBC MORPH BLD: ABNORMAL
SERVICE CMNT-IMP: NORMAL
SERVICE CMNT-IMP: NORMAL
SODIUM SERPL-SCNC: 139 MMOL/L (ref 136–145)
WBC # BLD AUTO: 8.3 K/UL (ref 4.6–13.2)

## 2025-09-03 PROCEDURE — 6370000000 HC RX 637 (ALT 250 FOR IP)

## 2025-09-03 PROCEDURE — 6360000002 HC RX W HCPCS: Performed by: STUDENT IN AN ORGANIZED HEALTH CARE EDUCATION/TRAINING PROGRAM

## 2025-09-03 PROCEDURE — 99232 SBSQ HOSP IP/OBS MODERATE 35: CPT | Performed by: STUDENT IN AN ORGANIZED HEALTH CARE EDUCATION/TRAINING PROGRAM

## 2025-09-03 PROCEDURE — 6360000002 HC RX W HCPCS: Performed by: INTERNAL MEDICINE

## 2025-09-03 PROCEDURE — 2580000003 HC RX 258: Performed by: INTERNAL MEDICINE

## 2025-09-03 PROCEDURE — 94640 AIRWAY INHALATION TREATMENT: CPT

## 2025-09-03 PROCEDURE — 80048 BASIC METABOLIC PNL TOTAL CA: CPT

## 2025-09-03 PROCEDURE — 6370000000 HC RX 637 (ALT 250 FOR IP): Performed by: FAMILY MEDICINE

## 2025-09-03 PROCEDURE — 6370000000 HC RX 637 (ALT 250 FOR IP): Performed by: STUDENT IN AN ORGANIZED HEALTH CARE EDUCATION/TRAINING PROGRAM

## 2025-09-03 PROCEDURE — 2700000000 HC OXYGEN THERAPY PER DAY

## 2025-09-03 PROCEDURE — 82962 GLUCOSE BLOOD TEST: CPT

## 2025-09-03 PROCEDURE — 97535 SELF CARE MNGMENT TRAINING: CPT

## 2025-09-03 PROCEDURE — 85610 PROTHROMBIN TIME: CPT

## 2025-09-03 PROCEDURE — 6360000002 HC RX W HCPCS: Performed by: PHYSICIAN ASSISTANT

## 2025-09-03 PROCEDURE — 85025 COMPLETE CBC W/AUTO DIFF WBC: CPT

## 2025-09-03 PROCEDURE — 94761 N-INVAS EAR/PLS OXIMETRY MLT: CPT

## 2025-09-03 PROCEDURE — 36415 COLL VENOUS BLD VENIPUNCTURE: CPT

## 2025-09-03 PROCEDURE — 99232 SBSQ HOSP IP/OBS MODERATE 35: CPT | Performed by: INTERNAL MEDICINE

## 2025-09-03 PROCEDURE — 83735 ASSAY OF MAGNESIUM: CPT

## 2025-09-03 PROCEDURE — 1100000003 HC PRIVATE W/ TELEMETRY

## 2025-09-03 RX ORDER — WARFARIN SODIUM 5 MG/1
5 TABLET ORAL
Status: COMPLETED | OUTPATIENT
Start: 2025-09-03 | End: 2025-09-03

## 2025-09-03 RX ORDER — SENNOSIDES 8.6 MG/1
1 TABLET ORAL DAILY
Status: DISCONTINUED | OUTPATIENT
Start: 2025-09-03 | End: 2025-09-06 | Stop reason: HOSPADM

## 2025-09-03 RX ADMIN — WARFARIN SODIUM 5 MG: 5 TABLET ORAL at 18:26

## 2025-09-03 RX ADMIN — NAFCILLIN SODIUM 2000 MG: 2 INJECTION, POWDER, LYOPHILIZED, FOR SOLUTION INTRAMUSCULAR; INTRAVENOUS at 14:38

## 2025-09-03 RX ADMIN — HYDROCORTISONE: 1 CREAM TOPICAL at 22:16

## 2025-09-03 RX ADMIN — POLYETHYLENE GLYCOL 3350 17 G: 17 POWDER, FOR SOLUTION ORAL at 12:22

## 2025-09-03 RX ADMIN — INSULIN GLARGINE 16 UNITS: 100 INJECTION, SOLUTION SUBCUTANEOUS at 22:15

## 2025-09-03 RX ADMIN — HEPARIN SODIUM AND DEXTROSE 10 UNITS/KG/HR: 10000; 5 INJECTION INTRAVENOUS at 22:14

## 2025-09-03 RX ADMIN — NAFCILLIN SODIUM 2000 MG: 2 INJECTION, POWDER, LYOPHILIZED, FOR SOLUTION INTRAMUSCULAR; INTRAVENOUS at 09:00

## 2025-09-03 RX ADMIN — METOPROLOL SUCCINATE 50 MG: 50 TABLET, EXTENDED RELEASE ORAL at 09:21

## 2025-09-03 RX ADMIN — ROSUVASTATIN CALCIUM 20 MG: 20 TABLET, FILM COATED ORAL at 22:15

## 2025-09-03 RX ADMIN — BUDESONIDE 1 MG: 1 SUSPENSION RESPIRATORY (INHALATION) at 19:16

## 2025-09-03 RX ADMIN — PANTOPRAZOLE SODIUM 40 MG: 40 TABLET, DELAYED RELEASE ORAL at 06:49

## 2025-09-03 RX ADMIN — SENNOSIDES 8.6 MG: 8.6 TABLET, FILM COATED ORAL at 14:37

## 2025-09-03 RX ADMIN — ARFORMOTEROL TARTRATE 15 MCG: 15 SOLUTION RESPIRATORY (INHALATION) at 07:41

## 2025-09-03 RX ADMIN — NAFCILLIN SODIUM 2000 MG: 2 INJECTION, POWDER, LYOPHILIZED, FOR SOLUTION INTRAMUSCULAR; INTRAVENOUS at 06:08

## 2025-09-03 RX ADMIN — BUDESONIDE 1 MG: 1 SUSPENSION RESPIRATORY (INHALATION) at 07:41

## 2025-09-03 RX ADMIN — LEVOTHYROXINE SODIUM 125 MCG: 125 TABLET ORAL at 06:49

## 2025-09-03 RX ADMIN — NAFCILLIN SODIUM 2000 MG: 2 INJECTION, POWDER, LYOPHILIZED, FOR SOLUTION INTRAMUSCULAR; INTRAVENOUS at 02:19

## 2025-09-03 RX ADMIN — HYDROCORTISONE: 1 CREAM TOPICAL at 10:17

## 2025-09-03 RX ADMIN — NAFCILLIN SODIUM 2000 MG: 2 INJECTION, POWDER, LYOPHILIZED, FOR SOLUTION INTRAMUSCULAR; INTRAVENOUS at 22:15

## 2025-09-03 RX ADMIN — NAFCILLIN SODIUM 2000 MG: 2 INJECTION, POWDER, LYOPHILIZED, FOR SOLUTION INTRAMUSCULAR; INTRAVENOUS at 18:47

## 2025-09-03 RX ADMIN — ARFORMOTEROL TARTRATE 15 MCG: 15 SOLUTION RESPIRATORY (INHALATION) at 19:16

## 2025-09-03 RX ADMIN — INSULIN LISPRO 2 UNITS: 100 INJECTION, SOLUTION INTRAVENOUS; SUBCUTANEOUS at 18:26

## 2025-09-03 ASSESSMENT — PAIN DESCRIPTION - DESCRIPTORS
DESCRIPTORS: ACHING
DESCRIPTORS: ACHING;CRAMPING
DESCRIPTORS: ACHING

## 2025-09-03 ASSESSMENT — PAIN - FUNCTIONAL ASSESSMENT
PAIN_FUNCTIONAL_ASSESSMENT: PREVENTS OR INTERFERES SOME ACTIVE ACTIVITIES AND ADLS
PAIN_FUNCTIONAL_ASSESSMENT: ACTIVITIES ARE NOT PREVENTED
PAIN_FUNCTIONAL_ASSESSMENT: PREVENTS OR INTERFERES SOME ACTIVE ACTIVITIES AND ADLS

## 2025-09-03 ASSESSMENT — PAIN DESCRIPTION - LOCATION
LOCATION: ABDOMEN

## 2025-09-03 ASSESSMENT — PAIN DESCRIPTION - FREQUENCY
FREQUENCY: CONTINUOUS

## 2025-09-03 ASSESSMENT — PAIN DESCRIPTION - ORIENTATION
ORIENTATION: LOWER
ORIENTATION: LEFT

## 2025-09-03 ASSESSMENT — PAIN SCALES - GENERAL
PAINLEVEL_OUTOF10: 5
PAINLEVEL_OUTOF10: 3
PAINLEVEL_OUTOF10: 0
PAINLEVEL_OUTOF10: 9
PAINLEVEL_OUTOF10: 6
PAINLEVEL_OUTOF10: 8
PAINLEVEL_OUTOF10: 0

## 2025-09-03 ASSESSMENT — PAIN DESCRIPTION - PAIN TYPE: TYPE: CHRONIC PAIN;ACUTE PAIN

## 2025-09-04 ENCOUNTER — APPOINTMENT (OUTPATIENT)
Facility: HOSPITAL | Age: 80
End: 2025-09-04
Payer: MEDICARE

## 2025-09-04 LAB
ANION GAP SERPL CALC-SCNC: 13 MMOL/L (ref 3–18)
APTT PPP: 165.5 SEC (ref 21.7–34.2)
BASOPHILS # BLD: 0.07 K/UL (ref 0–0.1)
BASOPHILS NFR BLD: 0.8 % (ref 0–2)
BUN SERPL-MCNC: 13 MG/DL (ref 6–23)
BUN/CREAT SERPL: 15 (ref 12–20)
CALCIUM SERPL-MCNC: 8.9 MG/DL (ref 8.5–10.1)
CHLORIDE SERPL-SCNC: 107 MMOL/L (ref 98–107)
CO2 SERPL-SCNC: 21 MMOL/L (ref 21–32)
CREAT SERPL-MCNC: 0.88 MG/DL (ref 0.6–1.3)
DIFFERENTIAL METHOD BLD: ABNORMAL
EOSINOPHIL # BLD: 0.11 K/UL (ref 0–0.4)
EOSINOPHIL NFR BLD: 1.3 % (ref 0–5)
ERYTHROCYTE [DISTWIDTH] IN BLOOD BY AUTOMATED COUNT: 17 % (ref 11.6–14.5)
GLUCOSE BLD STRIP.AUTO-MCNC: 130 MG/DL (ref 70–110)
GLUCOSE BLD STRIP.AUTO-MCNC: 137 MG/DL (ref 70–110)
GLUCOSE BLD STRIP.AUTO-MCNC: 143 MG/DL (ref 70–110)
GLUCOSE BLD STRIP.AUTO-MCNC: 147 MG/DL (ref 70–110)
GLUCOSE SERPL-MCNC: 140 MG/DL (ref 74–108)
HCT VFR BLD AUTO: 31.4 % (ref 36–48)
HGB BLD-MCNC: 10.2 G/DL (ref 13–16)
IMM GRANULOCYTES # BLD AUTO: 0.37 K/UL (ref 0–0.04)
IMM GRANULOCYTES NFR BLD AUTO: 4.4 % (ref 0–0.5)
INR PPP: 1.9 (ref 0.9–1.2)
LYMPHOCYTES # BLD: 1.46 K/UL (ref 0.9–3.6)
LYMPHOCYTES NFR BLD: 17.5 % (ref 21–52)
MAGNESIUM SERPL-MCNC: 1.9 MG/DL (ref 1.6–2.6)
MCH RBC QN AUTO: 28 PG (ref 24–34)
MCHC RBC AUTO-ENTMCNC: 32.5 G/DL (ref 31–37)
MCV RBC AUTO: 86.3 FL (ref 78–100)
MONOCYTES # BLD: 0.87 K/UL (ref 0.05–1.2)
MONOCYTES NFR BLD: 10.4 % (ref 3–10)
NEUTS SEG # BLD: 5.45 K/UL (ref 1.8–8)
NEUTS SEG NFR BLD: 65.6 % (ref 40–73)
NRBC # BLD: 0.08 K/UL (ref 0–0.01)
NRBC BLD-RTO: 1 PER 100 WBC
PLATELET # BLD AUTO: 363 K/UL (ref 135–420)
PMV BLD AUTO: 10 FL (ref 9.2–11.8)
POTASSIUM SERPL-SCNC: 3.3 MMOL/L (ref 3.5–5.5)
PROTHROMBIN TIME: 22.1 SEC (ref 12–15.1)
RBC # BLD AUTO: 3.64 M/UL (ref 4.35–5.65)
SODIUM SERPL-SCNC: 141 MMOL/L (ref 136–145)
WBC # BLD AUTO: 8.3 K/UL (ref 4.6–13.2)

## 2025-09-04 PROCEDURE — 6360000002 HC RX W HCPCS: Performed by: PHYSICIAN ASSISTANT

## 2025-09-04 PROCEDURE — 6360000002 HC RX W HCPCS: Performed by: INTERNAL MEDICINE

## 2025-09-04 PROCEDURE — 82962 GLUCOSE BLOOD TEST: CPT

## 2025-09-04 PROCEDURE — 2580000003 HC RX 258: Performed by: INTERNAL MEDICINE

## 2025-09-04 PROCEDURE — 36415 COLL VENOUS BLD VENIPUNCTURE: CPT

## 2025-09-04 PROCEDURE — 6370000000 HC RX 637 (ALT 250 FOR IP)

## 2025-09-04 PROCEDURE — 6360000004 HC RX CONTRAST MEDICATION: Performed by: STUDENT IN AN ORGANIZED HEALTH CARE EDUCATION/TRAINING PROGRAM

## 2025-09-04 PROCEDURE — 99232 SBSQ HOSP IP/OBS MODERATE 35: CPT | Performed by: INTERNAL MEDICINE

## 2025-09-04 PROCEDURE — 80048 BASIC METABOLIC PNL TOTAL CA: CPT

## 2025-09-04 PROCEDURE — 6370000000 HC RX 637 (ALT 250 FOR IP): Performed by: FAMILY MEDICINE

## 2025-09-04 PROCEDURE — 6370000000 HC RX 637 (ALT 250 FOR IP): Performed by: STUDENT IN AN ORGANIZED HEALTH CARE EDUCATION/TRAINING PROGRAM

## 2025-09-04 PROCEDURE — 94640 AIRWAY INHALATION TREATMENT: CPT

## 2025-09-04 PROCEDURE — 85025 COMPLETE CBC W/AUTO DIFF WBC: CPT

## 2025-09-04 PROCEDURE — 85610 PROTHROMBIN TIME: CPT

## 2025-09-04 PROCEDURE — 99232 SBSQ HOSP IP/OBS MODERATE 35: CPT | Performed by: STUDENT IN AN ORGANIZED HEALTH CARE EDUCATION/TRAINING PROGRAM

## 2025-09-04 PROCEDURE — 83735 ASSAY OF MAGNESIUM: CPT

## 2025-09-04 PROCEDURE — 94761 N-INVAS EAR/PLS OXIMETRY MLT: CPT

## 2025-09-04 PROCEDURE — 72126 CT NECK SPINE W/DYE: CPT

## 2025-09-04 PROCEDURE — 85730 THROMBOPLASTIN TIME PARTIAL: CPT

## 2025-09-04 PROCEDURE — 1100000003 HC PRIVATE W/ TELEMETRY

## 2025-09-04 RX ORDER — WARFARIN SODIUM 2.5 MG/1
2.5 TABLET ORAL
Status: COMPLETED | OUTPATIENT
Start: 2025-09-04 | End: 2025-09-04

## 2025-09-04 RX ORDER — DIAZEPAM 10 MG/2ML
5 INJECTION, SOLUTION INTRAMUSCULAR; INTRAVENOUS EVERY 4 HOURS PRN
Status: DISCONTINUED | OUTPATIENT
Start: 2025-09-04 | End: 2025-09-04

## 2025-09-04 RX ORDER — POLYETHYLENE GLYCOL 3350 17 G/17G
17 POWDER, FOR SOLUTION ORAL DAILY
Status: DISCONTINUED | OUTPATIENT
Start: 2025-09-04 | End: 2025-09-06 | Stop reason: HOSPADM

## 2025-09-04 RX ORDER — LACTULOSE 10 G/15ML
20 SOLUTION ORAL 3 TIMES DAILY
Status: DISCONTINUED | OUTPATIENT
Start: 2025-09-04 | End: 2025-09-06 | Stop reason: HOSPADM

## 2025-09-04 RX ORDER — IOPAMIDOL 612 MG/ML
75 INJECTION, SOLUTION INTRAVASCULAR
Status: COMPLETED | OUTPATIENT
Start: 2025-09-04 | End: 2025-09-04

## 2025-09-04 RX ORDER — METOPROLOL SUCCINATE 100 MG/1
100 TABLET, EXTENDED RELEASE ORAL DAILY
Status: DISCONTINUED | OUTPATIENT
Start: 2025-09-05 | End: 2025-09-06 | Stop reason: HOSPADM

## 2025-09-04 RX ADMIN — NAFCILLIN SODIUM 2000 MG: 2 INJECTION, POWDER, LYOPHILIZED, FOR SOLUTION INTRAMUSCULAR; INTRAVENOUS at 10:10

## 2025-09-04 RX ADMIN — NAFCILLIN SODIUM 2000 MG: 2 INJECTION, POWDER, LYOPHILIZED, FOR SOLUTION INTRAMUSCULAR; INTRAVENOUS at 22:03

## 2025-09-04 RX ADMIN — ROSUVASTATIN CALCIUM 20 MG: 20 TABLET, FILM COATED ORAL at 20:54

## 2025-09-04 RX ADMIN — HYDROCORTISONE: 1 CREAM TOPICAL at 20:54

## 2025-09-04 RX ADMIN — LACTULOSE 20 G: 20 SOLUTION ORAL at 20:54

## 2025-09-04 RX ADMIN — BUDESONIDE 1 MG: 1 SUSPENSION RESPIRATORY (INHALATION) at 08:41

## 2025-09-04 RX ADMIN — WARFARIN SODIUM 2.5 MG: 2.5 TABLET ORAL at 17:49

## 2025-09-04 RX ADMIN — ARFORMOTEROL TARTRATE 15 MCG: 15 SOLUTION RESPIRATORY (INHALATION) at 20:56

## 2025-09-04 RX ADMIN — POLYETHYLENE GLYCOL 3350 17 G: 17 POWDER, FOR SOLUTION ORAL at 12:27

## 2025-09-04 RX ADMIN — POTASSIUM CHLORIDE 40 MEQ: 1500 TABLET, EXTENDED RELEASE ORAL at 06:18

## 2025-09-04 RX ADMIN — OXYCODONE HYDROCHLORIDE 5 MG: 5 TABLET ORAL at 10:10

## 2025-09-04 RX ADMIN — SENNOSIDES 8.6 MG: 8.6 TABLET, FILM COATED ORAL at 09:59

## 2025-09-04 RX ADMIN — INSULIN GLARGINE 16 UNITS: 100 INJECTION, SOLUTION SUBCUTANEOUS at 20:54

## 2025-09-04 RX ADMIN — LEVOTHYROXINE SODIUM 125 MCG: 125 TABLET ORAL at 06:14

## 2025-09-04 RX ADMIN — NAFCILLIN SODIUM 2000 MG: 2 INJECTION, POWDER, LYOPHILIZED, FOR SOLUTION INTRAMUSCULAR; INTRAVENOUS at 17:55

## 2025-09-04 RX ADMIN — METOPROLOL SUCCINATE 50 MG: 50 TABLET, EXTENDED RELEASE ORAL at 09:59

## 2025-09-04 RX ADMIN — BUDESONIDE 1 MG: 1 SUSPENSION RESPIRATORY (INHALATION) at 20:56

## 2025-09-04 RX ADMIN — HYDROCORTISONE: 1 CREAM TOPICAL at 14:06

## 2025-09-04 RX ADMIN — NAFCILLIN SODIUM 2000 MG: 2 INJECTION, POWDER, LYOPHILIZED, FOR SOLUTION INTRAMUSCULAR; INTRAVENOUS at 06:11

## 2025-09-04 RX ADMIN — NAFCILLIN SODIUM 2000 MG: 2 INJECTION, POWDER, LYOPHILIZED, FOR SOLUTION INTRAMUSCULAR; INTRAVENOUS at 14:10

## 2025-09-04 RX ADMIN — HYDROCORTISONE: 1 CREAM TOPICAL at 10:15

## 2025-09-04 RX ADMIN — LACTULOSE 20 G: 20 SOLUTION ORAL at 14:01

## 2025-09-04 RX ADMIN — PANTOPRAZOLE SODIUM 40 MG: 40 TABLET, DELAYED RELEASE ORAL at 06:14

## 2025-09-04 RX ADMIN — HYDROCORTISONE: 1 CREAM TOPICAL at 17:49

## 2025-09-04 RX ADMIN — NAFCILLIN SODIUM 2000 MG: 2 INJECTION, POWDER, LYOPHILIZED, FOR SOLUTION INTRAMUSCULAR; INTRAVENOUS at 03:14

## 2025-09-04 RX ADMIN — IOPAMIDOL 75 ML: 612 INJECTION, SOLUTION INTRAVENOUS at 16:06

## 2025-09-04 RX ADMIN — ARFORMOTEROL TARTRATE 15 MCG: 15 SOLUTION RESPIRATORY (INHALATION) at 08:41

## 2025-09-04 ASSESSMENT — PAIN SCALES - GENERAL
PAINLEVEL_OUTOF10: 6
PAINLEVEL_OUTOF10: 0
PAINLEVEL_OUTOF10: 6
PAINLEVEL_OUTOF10: 0
PAINLEVEL_OUTOF10: 10

## 2025-09-04 ASSESSMENT — PAIN - FUNCTIONAL ASSESSMENT
PAIN_FUNCTIONAL_ASSESSMENT: 0-10
PAIN_FUNCTIONAL_ASSESSMENT: INTOLERABLE, UNABLE TO DO ANY ACTIVE OR PASSIVE ACTIVITIES

## 2025-09-04 ASSESSMENT — PAIN DESCRIPTION - ORIENTATION: ORIENTATION: MID

## 2025-09-04 ASSESSMENT — PAIN DESCRIPTION - LOCATION: LOCATION: SACRUM

## 2025-09-05 ENCOUNTER — APPOINTMENT (OUTPATIENT)
Facility: HOSPITAL | Age: 80
End: 2025-09-05
Payer: MEDICARE

## 2025-09-05 LAB
APTT PPP: 91.3 SEC (ref 21.7–34.2)
APTT PPP: 93.9 SEC (ref 21.7–34.2)
BACTERIA SPEC CULT: NORMAL
GLUCOSE BLD STRIP.AUTO-MCNC: 116 MG/DL (ref 70–110)
GLUCOSE BLD STRIP.AUTO-MCNC: 118 MG/DL (ref 70–110)
GLUCOSE BLD STRIP.AUTO-MCNC: 128 MG/DL (ref 70–110)
GLUCOSE BLD STRIP.AUTO-MCNC: 133 MG/DL (ref 70–110)
INR PPP: 2.4 (ref 0.9–1.2)
PROTHROMBIN TIME: 26.6 SEC (ref 12–15.1)
SERVICE CMNT-IMP: NORMAL

## 2025-09-05 PROCEDURE — 94761 N-INVAS EAR/PLS OXIMETRY MLT: CPT

## 2025-09-05 PROCEDURE — 36415 COLL VENOUS BLD VENIPUNCTURE: CPT

## 2025-09-05 PROCEDURE — 97535 SELF CARE MNGMENT TRAINING: CPT

## 2025-09-05 PROCEDURE — 6360000004 HC RX CONTRAST MEDICATION: Performed by: STUDENT IN AN ORGANIZED HEALTH CARE EDUCATION/TRAINING PROGRAM

## 2025-09-05 PROCEDURE — 70491 CT SOFT TISSUE NECK W/DYE: CPT

## 2025-09-05 PROCEDURE — 6370000000 HC RX 637 (ALT 250 FOR IP): Performed by: FAMILY MEDICINE

## 2025-09-05 PROCEDURE — 6360000002 HC RX W HCPCS: Performed by: INTERNAL MEDICINE

## 2025-09-05 PROCEDURE — 85610 PROTHROMBIN TIME: CPT

## 2025-09-05 PROCEDURE — 6370000000 HC RX 637 (ALT 250 FOR IP)

## 2025-09-05 PROCEDURE — 6360000002 HC RX W HCPCS: Performed by: STUDENT IN AN ORGANIZED HEALTH CARE EDUCATION/TRAINING PROGRAM

## 2025-09-05 PROCEDURE — 94640 AIRWAY INHALATION TREATMENT: CPT

## 2025-09-05 PROCEDURE — 1100000003 HC PRIVATE W/ TELEMETRY

## 2025-09-05 PROCEDURE — 99232 SBSQ HOSP IP/OBS MODERATE 35: CPT | Performed by: STUDENT IN AN ORGANIZED HEALTH CARE EDUCATION/TRAINING PROGRAM

## 2025-09-05 PROCEDURE — 82962 GLUCOSE BLOOD TEST: CPT

## 2025-09-05 PROCEDURE — 6370000000 HC RX 637 (ALT 250 FOR IP): Performed by: STUDENT IN AN ORGANIZED HEALTH CARE EDUCATION/TRAINING PROGRAM

## 2025-09-05 PROCEDURE — 2580000003 HC RX 258: Performed by: INTERNAL MEDICINE

## 2025-09-05 PROCEDURE — 97530 THERAPEUTIC ACTIVITIES: CPT

## 2025-09-05 PROCEDURE — 85730 THROMBOPLASTIN TIME PARTIAL: CPT

## 2025-09-05 PROCEDURE — 6360000002 HC RX W HCPCS: Performed by: PHYSICIAN ASSISTANT

## 2025-09-05 RX ORDER — IOPAMIDOL 612 MG/ML
75 INJECTION, SOLUTION INTRAVASCULAR
Status: COMPLETED | OUTPATIENT
Start: 2025-09-05 | End: 2025-09-05

## 2025-09-05 RX ADMIN — IOPAMIDOL 75 ML: 612 INJECTION, SOLUTION INTRAVENOUS at 17:34

## 2025-09-05 RX ADMIN — NAFCILLIN SODIUM 2000 MG: 2 INJECTION, POWDER, LYOPHILIZED, FOR SOLUTION INTRAMUSCULAR; INTRAVENOUS at 14:28

## 2025-09-05 RX ADMIN — LACTULOSE 20 G: 20 SOLUTION ORAL at 21:19

## 2025-09-05 RX ADMIN — NAFCILLIN SODIUM 2000 MG: 2 INJECTION, POWDER, LYOPHILIZED, FOR SOLUTION INTRAMUSCULAR; INTRAVENOUS at 05:57

## 2025-09-05 RX ADMIN — ARFORMOTEROL TARTRATE 15 MCG: 15 SOLUTION RESPIRATORY (INHALATION) at 07:45

## 2025-09-05 RX ADMIN — NAFCILLIN SODIUM 2000 MG: 2 INJECTION, POWDER, LYOPHILIZED, FOR SOLUTION INTRAMUSCULAR; INTRAVENOUS at 02:04

## 2025-09-05 RX ADMIN — BUDESONIDE 1 MG: 1 SUSPENSION RESPIRATORY (INHALATION) at 07:45

## 2025-09-05 RX ADMIN — HEPARIN SODIUM AND DEXTROSE 7 UNITS/KG/HR: 10000; 5 INJECTION INTRAVENOUS at 12:27

## 2025-09-05 RX ADMIN — POLYETHYLENE GLYCOL 3350 17 G: 17 POWDER, FOR SOLUTION ORAL at 10:07

## 2025-09-05 RX ADMIN — SENNOSIDES 8.6 MG: 8.6 TABLET, FILM COATED ORAL at 10:07

## 2025-09-05 RX ADMIN — HYDROCORTISONE: 1 CREAM TOPICAL at 21:12

## 2025-09-05 RX ADMIN — NAFCILLIN SODIUM 2000 MG: 2 INJECTION, POWDER, LYOPHILIZED, FOR SOLUTION INTRAMUSCULAR; INTRAVENOUS at 18:07

## 2025-09-05 RX ADMIN — HYDROCORTISONE: 1 CREAM TOPICAL at 13:04

## 2025-09-05 RX ADMIN — INSULIN GLARGINE 16 UNITS: 100 INJECTION, SOLUTION SUBCUTANEOUS at 21:10

## 2025-09-05 RX ADMIN — ARFORMOTEROL TARTRATE 15 MCG: 15 SOLUTION RESPIRATORY (INHALATION) at 20:02

## 2025-09-05 RX ADMIN — LEVOTHYROXINE SODIUM 125 MCG: 125 TABLET ORAL at 05:57

## 2025-09-05 RX ADMIN — PANTOPRAZOLE SODIUM 40 MG: 40 TABLET, DELAYED RELEASE ORAL at 05:57

## 2025-09-05 RX ADMIN — METOPROLOL SUCCINATE 100 MG: 100 TABLET, EXTENDED RELEASE ORAL at 10:09

## 2025-09-05 RX ADMIN — NAFCILLIN SODIUM 2000 MG: 2 INJECTION, POWDER, LYOPHILIZED, FOR SOLUTION INTRAMUSCULAR; INTRAVENOUS at 10:35

## 2025-09-05 RX ADMIN — ROSUVASTATIN CALCIUM 20 MG: 20 TABLET, FILM COATED ORAL at 21:11

## 2025-09-05 RX ADMIN — BUDESONIDE 1 MG: 1 SUSPENSION RESPIRATORY (INHALATION) at 20:02

## 2025-09-05 RX ADMIN — HYDROCORTISONE: 1 CREAM TOPICAL at 10:38

## 2025-09-05 RX ADMIN — HYDROCORTISONE: 1 CREAM TOPICAL at 18:08

## 2025-09-05 RX ADMIN — NAFCILLIN SODIUM 2000 MG: 2 INJECTION, POWDER, LYOPHILIZED, FOR SOLUTION INTRAMUSCULAR; INTRAVENOUS at 21:17

## 2025-09-05 ASSESSMENT — PAIN SCALES - GENERAL
PAINLEVEL_OUTOF10: 0

## 2025-09-06 VITALS
BODY MASS INDEX: 31.28 KG/M2 | HEIGHT: 69 IN | OXYGEN SATURATION: 100 % | WEIGHT: 211.2 LBS | TEMPERATURE: 97.4 F | RESPIRATION RATE: 15 BRPM | DIASTOLIC BLOOD PRESSURE: 72 MMHG | HEART RATE: 72 BPM | SYSTOLIC BLOOD PRESSURE: 134 MMHG

## 2025-09-06 LAB
ANION GAP SERPL CALC-SCNC: 12 MMOL/L (ref 3–18)
APTT PPP: 116.3 SEC (ref 21.7–34.2)
APTT PPP: 50.1 SEC (ref 21.7–34.2)
BACTERIA SPEC CULT: NORMAL
BASOPHILS # BLD: 0 K/UL (ref 0–0.1)
BASOPHILS NFR BLD: 0 % (ref 0–2)
BUN SERPL-MCNC: 9 MG/DL (ref 6–23)
BUN/CREAT SERPL: 13 (ref 12–20)
CALCIUM SERPL-MCNC: 8.5 MG/DL (ref 8.5–10.1)
CHLORIDE SERPL-SCNC: 107 MMOL/L (ref 98–107)
CO2 SERPL-SCNC: 22 MMOL/L (ref 21–32)
CREAT SERPL-MCNC: 0.66 MG/DL (ref 0.6–1.3)
DIFFERENTIAL METHOD BLD: ABNORMAL
EOSINOPHIL # BLD: 0.11 K/UL (ref 0–0.4)
EOSINOPHIL NFR BLD: 1.3 % (ref 0–5)
ERYTHROCYTE [DISTWIDTH] IN BLOOD BY AUTOMATED COUNT: 17.6 % (ref 11.6–14.5)
GLUCOSE BLD STRIP.AUTO-MCNC: 100 MG/DL (ref 70–110)
GLUCOSE BLD STRIP.AUTO-MCNC: 108 MG/DL (ref 70–110)
GLUCOSE SERPL-MCNC: 83 MG/DL (ref 74–108)
HCT VFR BLD AUTO: 28.5 % (ref 36–48)
HGB BLD-MCNC: 9.3 G/DL (ref 13–16)
IMM GRANULOCYTES # BLD AUTO: 0.13 K/UL (ref 0–0.04)
IMM GRANULOCYTES NFR BLD AUTO: 1.5 % (ref 0–0.5)
INR PPP: 2.3 (ref 0.9–1.2)
LYMPHOCYTES # BLD: 1.27 K/UL (ref 0.9–3.6)
LYMPHOCYTES NFR BLD: 14.5 % (ref 21–52)
MAGNESIUM SERPL-MCNC: 1.8 MG/DL (ref 1.6–2.6)
MCH RBC QN AUTO: 28.6 PG (ref 24–34)
MCHC RBC AUTO-ENTMCNC: 32.6 G/DL (ref 31–37)
MCV RBC AUTO: 87.7 FL (ref 78–100)
MONOCYTES # BLD: 0.78 K/UL (ref 0.05–1.2)
MONOCYTES NFR BLD: 8.9 % (ref 3–10)
NEUTS SEG # BLD: 6.46 K/UL (ref 1.8–8)
NEUTS SEG NFR BLD: 73.8 % (ref 40–73)
NRBC # BLD: 0.05 K/UL (ref 0–0.01)
NRBC BLD-RTO: 0.6 PER 100 WBC
PLATELET # BLD AUTO: 344 K/UL (ref 135–420)
PMV BLD AUTO: 10.2 FL (ref 9.2–11.8)
POTASSIUM SERPL-SCNC: 2.6 MMOL/L (ref 3.5–5.5)
POTASSIUM SERPL-SCNC: 3.3 MMOL/L (ref 3.5–5.5)
PROTHROMBIN TIME: 25.9 SEC (ref 12–15.1)
RBC # BLD AUTO: 3.25 M/UL (ref 4.35–5.65)
SERVICE CMNT-IMP: NORMAL
SODIUM SERPL-SCNC: 141 MMOL/L (ref 136–145)
WBC # BLD AUTO: 8.8 K/UL (ref 4.6–13.2)

## 2025-09-06 PROCEDURE — 6360000002 HC RX W HCPCS: Performed by: PHYSICIAN ASSISTANT

## 2025-09-06 PROCEDURE — 85025 COMPLETE CBC W/AUTO DIFF WBC: CPT

## 2025-09-06 PROCEDURE — 80048 BASIC METABOLIC PNL TOTAL CA: CPT

## 2025-09-06 PROCEDURE — 84132 ASSAY OF SERUM POTASSIUM: CPT

## 2025-09-06 PROCEDURE — 94640 AIRWAY INHALATION TREATMENT: CPT

## 2025-09-06 PROCEDURE — 6370000000 HC RX 637 (ALT 250 FOR IP)

## 2025-09-06 PROCEDURE — 99239 HOSP IP/OBS DSCHRG MGMT >30: CPT | Performed by: STUDENT IN AN ORGANIZED HEALTH CARE EDUCATION/TRAINING PROGRAM

## 2025-09-06 PROCEDURE — 6360000002 HC RX W HCPCS: Performed by: INTERNAL MEDICINE

## 2025-09-06 PROCEDURE — 82962 GLUCOSE BLOOD TEST: CPT

## 2025-09-06 PROCEDURE — 85730 THROMBOPLASTIN TIME PARTIAL: CPT

## 2025-09-06 PROCEDURE — 6370000000 HC RX 637 (ALT 250 FOR IP): Performed by: FAMILY MEDICINE

## 2025-09-06 PROCEDURE — 83735 ASSAY OF MAGNESIUM: CPT

## 2025-09-06 PROCEDURE — 6370000000 HC RX 637 (ALT 250 FOR IP): Performed by: STUDENT IN AN ORGANIZED HEALTH CARE EDUCATION/TRAINING PROGRAM

## 2025-09-06 PROCEDURE — 94761 N-INVAS EAR/PLS OXIMETRY MLT: CPT

## 2025-09-06 PROCEDURE — 85610 PROTHROMBIN TIME: CPT

## 2025-09-06 PROCEDURE — 2580000003 HC RX 258: Performed by: INTERNAL MEDICINE

## 2025-09-06 PROCEDURE — 36415 COLL VENOUS BLD VENIPUNCTURE: CPT

## 2025-09-06 RX ORDER — WARFARIN SODIUM 5 MG/1
5 TABLET ORAL
Status: DISCONTINUED | OUTPATIENT
Start: 2025-09-06 | End: 2025-09-06 | Stop reason: HOSPADM

## 2025-09-06 RX ORDER — POTASSIUM CHLORIDE 1500 MG/1
20 TABLET, EXTENDED RELEASE ORAL 2 TIMES DAILY
Qty: 6 TABLET | Refills: 0
Start: 2025-09-06

## 2025-09-06 RX ORDER — OXYCODONE HYDROCHLORIDE 5 MG/1
5 TABLET ORAL EVERY 6 HOURS PRN
Qty: 12 TABLET | Refills: 0 | Status: SHIPPED | OUTPATIENT
Start: 2025-09-06 | End: 2025-09-06

## 2025-09-06 RX ORDER — OXYCODONE HYDROCHLORIDE 5 MG/1
5 TABLET ORAL EVERY 6 HOURS PRN
Qty: 12 TABLET | Refills: 0 | Status: SHIPPED | OUTPATIENT
Start: 2025-09-06 | End: 2025-09-09

## 2025-09-06 RX ADMIN — POTASSIUM BICARBONATE 40 MEQ: 782 TABLET, EFFERVESCENT ORAL at 06:14

## 2025-09-06 RX ADMIN — NAFCILLIN SODIUM 2000 MG: 2 INJECTION, POWDER, LYOPHILIZED, FOR SOLUTION INTRAMUSCULAR; INTRAVENOUS at 10:07

## 2025-09-06 RX ADMIN — HYDROCORTISONE: 1 CREAM TOPICAL at 13:39

## 2025-09-06 RX ADMIN — POTASSIUM BICARBONATE 40 MEQ: 782 TABLET, EFFERVESCENT ORAL at 05:47

## 2025-09-06 RX ADMIN — PANTOPRAZOLE SODIUM 40 MG: 40 TABLET, DELAYED RELEASE ORAL at 05:47

## 2025-09-06 RX ADMIN — METOPROLOL SUCCINATE 100 MG: 100 TABLET, EXTENDED RELEASE ORAL at 09:57

## 2025-09-06 RX ADMIN — HYDROCORTISONE: 1 CREAM TOPICAL at 09:59

## 2025-09-06 RX ADMIN — NAFCILLIN SODIUM 2000 MG: 2 INJECTION, POWDER, LYOPHILIZED, FOR SOLUTION INTRAMUSCULAR; INTRAVENOUS at 05:50

## 2025-09-06 RX ADMIN — ARFORMOTEROL TARTRATE 15 MCG: 15 SOLUTION RESPIRATORY (INHALATION) at 08:52

## 2025-09-06 RX ADMIN — LEVOTHYROXINE SODIUM 125 MCG: 125 TABLET ORAL at 05:47

## 2025-09-06 RX ADMIN — BUDESONIDE 1 MG: 1 SUSPENSION RESPIRATORY (INHALATION) at 08:52

## 2025-09-06 RX ADMIN — NAFCILLIN SODIUM 2000 MG: 2 INJECTION, POWDER, LYOPHILIZED, FOR SOLUTION INTRAMUSCULAR; INTRAVENOUS at 01:32

## 2025-09-06 RX ADMIN — NAFCILLIN SODIUM 2000 MG: 2 INJECTION, POWDER, LYOPHILIZED, FOR SOLUTION INTRAMUSCULAR; INTRAVENOUS at 13:39

## 2025-09-06 ASSESSMENT — PAIN SCALES - GENERAL
PAINLEVEL_OUTOF10: 0

## (undated) DEVICE — TRAY PREP DRY W/ PREM GLV 2 APPL 6 SPNG 2 UNDPD 1 OVERWRAP

## (undated) DEVICE — SLIM BODY SKIN STAPLER: Brand: APPOSE ULC

## (undated) DEVICE — ROOM TURNOVER PACK

## (undated) DEVICE — Z DISCONTINUED NO SUB IDED ELECTRODE ES L2.8IN S STL INSUL NDL DISP EDGE

## (undated) DEVICE — GAUZE SPONGES,8 PLY: Brand: CURITY

## (undated) DEVICE — INSULATED BLADE ELECTRODE: Brand: EDGE

## (undated) DEVICE — BIPOLAR FORCEPS CORD,BANANA LEADS: Brand: VALLEYLAB

## (undated) DEVICE — COVER US PRB W15XL120CM W/ GEL RUBBERBAND TAPE STRP FLD GEN

## (undated) DEVICE — DRAPE: MAGNETIC 12X16 30/CS: Brand: MEDICAL ACTION INDUSTRIES

## (undated) DEVICE — PROCEDURE KIT FLUID MGMT 10 FR CUST MAINFOLD

## (undated) DEVICE — GLIDESHEATH SLENDER STAINLESS STEEL KIT: Brand: GLIDESHEATH SLENDER

## (undated) DEVICE — ROUND DISSECTORS: Brand: DEROYAL

## (undated) DEVICE — RADIFOCUS GLIDEWIRE: Brand: GLIDEWIRE

## (undated) DEVICE — PACK PROCEDURE SURG VASC CATH 161 MMC LF

## (undated) DEVICE — SOFT SILICONE HYDROCELLULAR SACRUM DRESSING WITH LOCK AWAY LAYER: Brand: ALLEVYN LIFE SACRUM (LARGE) PACK OF 10

## (undated) DEVICE — Device

## (undated) DEVICE — SHEET, DRAPE, SPLIT, STERILE: Brand: MEDLINE

## (undated) DEVICE — SUTURE VCRL SZ 3-0 L27IN ABSRB UD L26MM SH 1/2 CIR J416H

## (undated) DEVICE — SET FLD ADMIN 3 W STPCOCK FIX FEM L BOR 1IN

## (undated) DEVICE — PACK PROCEDURE SURG MAJ W/ BASIN LF

## (undated) DEVICE — REM POLYHESIVE ADULT PATIENT RETURN ELECTRODE: Brand: VALLEYLAB

## (undated) DEVICE — STERILE POLYISOPRENE POWDER-FREE SURGICAL GLOVES: Brand: PROTEXIS

## (undated) DEVICE — SUT SLK 2-0SH 30IN BLK --

## (undated) DEVICE — AMD ANTIMICROBIAL DRAIN SPONGES, 6 PLY, 0.2% POLYHEXAMETHYLENE BIGUANIDE HCI (PHMB): Brand: EXCILON

## (undated) DEVICE — SUTURE NONABSORBABLE MONOFILAMENT 5-0 PS-2 18 IN BLK ETHILON 1666H

## (undated) DEVICE — GUIDEWIRE VASC L150CM DIA0.035IN FLX TIP L7CM PTFE STR FIX

## (undated) DEVICE — 3M(TM) TEGADERM(TM) TRANSPARENT FILM DRESSING FRAME STYLE 9505W: Brand: 3M™ TEGADERM™

## (undated) DEVICE — STIMULATOR 8562010 VARI-STIM 10PK ROHS: Brand: VARI-STIM®

## (undated) DEVICE — SUTURE PERMA-HAND SZ 3-0 L24IN NONABSORBABLE BLK W/O NDL SA74H

## (undated) DEVICE — SHEAR HARMONIC FOCUS OEM 9CM --

## (undated) DEVICE — ANGIOGRAPHY KIT CUST VASC

## (undated) DEVICE — TELFA NON-ADHERENT ABSORBENT DRESSING: Brand: TELFA

## (undated) DEVICE — KENDALL SCD EXPRESS SLEEVES, KNEE LENGTH, MEDIUM: Brand: KENDALL SCD

## (undated) DEVICE — DRAIN SURG 10FR L1/8IN DIA3.2MM SIL CHN RND FULL FLUT TRCR

## (undated) DEVICE — INTENDED FOR TISSUE SEPARATION, AND OTHER PROCEDURES THAT REQUIRE A SHARP SURGICAL BLADE TO PUNCTURE OR CUT.: Brand: BARD-PARKER ® CARBON RIB-BACK BLADES

## (undated) DEVICE — SUTURE PERMA-HAND SZ 2-0 L24IN NONABSORBABLE BLK W/O NDL SA75H

## (undated) DEVICE — PRESSURE MONITORING SET: Brand: TRUWAVE

## (undated) DEVICE — RADIFOCUS OPTITORQUE ANGIOGRAPHIC CATHETER: Brand: OPTITORQUE

## (undated) DEVICE — SPONGE LAP 18X18IN STRL -- 5/PK